# Patient Record
Sex: FEMALE | Race: WHITE | Employment: FULL TIME | URBAN - NONMETROPOLITAN AREA
[De-identification: names, ages, dates, MRNs, and addresses within clinical notes are randomized per-mention and may not be internally consistent; named-entity substitution may affect disease eponyms.]

---

## 2014-05-07 LAB
CHOLESTEROL, TOTAL: 121 MG/DL
CHOLESTEROL/HDL RATIO: 2.3
GLUCOSE BLD-MCNC: 90 MG/DL
HDLC SERPL-MCNC: 53 MG/DL (ref 35–70)
LDL CHOLESTEROL CALCULATED: 52.4 MG/DL (ref 0–160)
TRIGL SERPL-MCNC: 78 MG/DL
VLDLC SERPL CALC-MCNC: 16 MG/DL

## 2017-05-24 ENCOUNTER — OFFICE VISIT (OUTPATIENT)
Dept: FAMILY MEDICINE CLINIC | Age: 42
End: 2017-05-24
Payer: COMMERCIAL

## 2017-05-24 VITALS
SYSTOLIC BLOOD PRESSURE: 122 MMHG | WEIGHT: 140 LBS | HEIGHT: 62 IN | BODY MASS INDEX: 25.76 KG/M2 | TEMPERATURE: 96.9 F | DIASTOLIC BLOOD PRESSURE: 78 MMHG

## 2017-05-24 VITALS
WEIGHT: 136 LBS | BODY MASS INDEX: 25.03 KG/M2 | DIASTOLIC BLOOD PRESSURE: 88 MMHG | HEART RATE: 84 BPM | HEIGHT: 62 IN | SYSTOLIC BLOOD PRESSURE: 130 MMHG

## 2017-05-24 DIAGNOSIS — J01.10 ACUTE NON-RECURRENT FRONTAL SINUSITIS: Primary | ICD-10-CM

## 2017-05-24 DIAGNOSIS — G43.109 MIGRAINE WITH AURA AND WITHOUT STATUS MIGRAINOSUS, NOT INTRACTABLE: ICD-10-CM

## 2017-05-24 DIAGNOSIS — A63.0 GENITAL WARTS: ICD-10-CM

## 2017-05-24 DIAGNOSIS — J40 BRONCHITIS: ICD-10-CM

## 2017-05-24 DIAGNOSIS — Z72.0 TOBACCO USE: ICD-10-CM

## 2017-05-24 PROCEDURE — 99213 OFFICE O/P EST LOW 20 MIN: CPT | Performed by: FAMILY MEDICINE

## 2017-05-24 RX ORDER — PREDNISONE 20 MG/1
20 TABLET ORAL DAILY
Qty: 7 TABLET | Refills: 0 | Status: SHIPPED | OUTPATIENT
Start: 2017-05-24 | End: 2017-05-31

## 2017-05-24 RX ORDER — DOXYCYCLINE HYCLATE 100 MG
100 TABLET ORAL 2 TIMES DAILY
Qty: 20 TABLET | Refills: 0 | Status: SHIPPED | OUTPATIENT
Start: 2017-05-24 | End: 2017-06-03

## 2017-05-24 RX ORDER — ALBUTEROL SULFATE 90 UG/1
2 AEROSOL, METERED RESPIRATORY (INHALATION) EVERY 4 HOURS PRN
Qty: 1 INHALER | Refills: 5 | Status: SHIPPED | OUTPATIENT
Start: 2017-05-24

## 2017-05-24 ASSESSMENT — PATIENT HEALTH QUESTIONNAIRE - PHQ9
SUM OF ALL RESPONSES TO PHQ QUESTIONS 1-9: 0
1. LITTLE INTEREST OR PLEASURE IN DOING THINGS: 0
2. FEELING DOWN, DEPRESSED OR HOPELESS: 0
SUM OF ALL RESPONSES TO PHQ9 QUESTIONS 1 & 2: 0

## 2017-05-24 ASSESSMENT — ENCOUNTER SYMPTOMS
SHORTNESS OF BREATH: 1
COUGH: 1

## 2017-09-07 ENCOUNTER — OFFICE VISIT (OUTPATIENT)
Dept: FAMILY MEDICINE CLINIC | Age: 42
End: 2017-09-07
Payer: COMMERCIAL

## 2017-09-07 VITALS
HEIGHT: 62 IN | SYSTOLIC BLOOD PRESSURE: 114 MMHG | RESPIRATION RATE: 20 BRPM | HEART RATE: 74 BPM | OXYGEN SATURATION: 99 % | BODY MASS INDEX: 25.16 KG/M2 | TEMPERATURE: 97.1 F | DIASTOLIC BLOOD PRESSURE: 82 MMHG | WEIGHT: 136.7 LBS

## 2017-09-07 DIAGNOSIS — J01.00 ACUTE MAXILLARY SINUSITIS, RECURRENCE NOT SPECIFIED: Primary | ICD-10-CM

## 2017-09-07 PROCEDURE — 99213 OFFICE O/P EST LOW 20 MIN: CPT | Performed by: NURSE PRACTITIONER

## 2017-09-07 RX ORDER — CEPHALEXIN 500 MG/1
500 CAPSULE ORAL 3 TIMES DAILY
Qty: 30 CAPSULE | Refills: 0 | Status: SHIPPED | OUTPATIENT
Start: 2017-09-07 | End: 2017-09-17

## 2017-09-07 ASSESSMENT — ENCOUNTER SYMPTOMS
RHINORRHEA: 0
SORE THROAT: 0
SHORTNESS OF BREATH: 1
COUGH: 1
NAUSEA: 0
VOMITING: 0
SWOLLEN GLANDS: 0
DIARRHEA: 0
ABDOMINAL PAIN: 0
WHEEZING: 1
SINUS PAIN: 1

## 2017-09-10 ASSESSMENT — ENCOUNTER SYMPTOMS: SINUS PRESSURE: 1

## 2017-11-18 ENCOUNTER — OFFICE VISIT (OUTPATIENT)
Dept: FAMILY MEDICINE CLINIC | Age: 42
End: 2017-11-18
Payer: COMMERCIAL

## 2017-11-18 VITALS
TEMPERATURE: 98.6 F | WEIGHT: 138 LBS | HEART RATE: 80 BPM | SYSTOLIC BLOOD PRESSURE: 118 MMHG | BODY MASS INDEX: 25.24 KG/M2 | DIASTOLIC BLOOD PRESSURE: 68 MMHG

## 2017-11-18 DIAGNOSIS — Z72.0 TOBACCO USE: ICD-10-CM

## 2017-11-18 DIAGNOSIS — R09.81 NASAL CONGESTION: ICD-10-CM

## 2017-11-18 DIAGNOSIS — J06.9 VIRAL URI: Primary | ICD-10-CM

## 2017-11-18 PROCEDURE — 99213 OFFICE O/P EST LOW 20 MIN: CPT | Performed by: FAMILY MEDICINE

## 2017-11-18 RX ORDER — IPRATROPIUM BROMIDE 42 UG/1
2 SPRAY, METERED NASAL 4 TIMES DAILY PRN
Qty: 1 BOTTLE | Refills: 0 | Status: SHIPPED | OUTPATIENT
Start: 2017-11-18 | End: 2018-04-20 | Stop reason: ALTCHOICE

## 2017-11-18 ASSESSMENT — ENCOUNTER SYMPTOMS
SINUS PAIN: 1
COUGH: 1
SINUS PRESSURE: 1

## 2017-11-18 NOTE — PROGRESS NOTES
[address] [address] [address]  Dept: [phone]  Dept Fax: [phone]      Hong Goodrich is a 40 y.o. female who presents today for her medical conditions/complaints as noted below. Chief Complaint   Patient presents with    Congestion    Facial Pain       HPI:     HPI   Noted for couple days ago noted fluttering in chest like URI sx. Yesterday began with more head pressure and increasing. Today noting body aches. Minimal cough beginning without production, using albuterol daily in am even prior to illness. Pt is smoking still. Had flu vaccine this year. Current Outpatient Prescriptions   Medication Sig Dispense Refill    ipratropium (ATROVENT) 0.06 % nasal spray 2 sprays by Nasal route 4 times daily as needed for Rhinitis 1 Bottle 0    albuterol sulfate HFA (VENTOLIN HFA) 108 (90 BASE) MCG/ACT inhaler Inhale 2 puffs into the lungs every 4 hours as needed for Wheezing or Shortness of Breath 1 Inhaler 5     No current facility-administered medications for this visit. Allergies   Allergen Reactions    Chantix [Varenicline]      vomiting    Prozac [Fluoxetine Hcl]      cutting       Subjective:      Review of Systems   Constitutional: Negative for fever. Sx started approx 2 days ago   HENT: Positive for congestion, sinus pain and sinus pressure. Respiratory: Positive for cough (non productive). Neurological: Positive for headaches (Slight pressure). Objective:     /68   Pulse 80   Temp 98.6 °F (37 °C)   Wt 138 lb (62.6 kg)   BMI 25.24 kg/m²   Physical Exam   Constitutional: She is oriented to person, place, and time. She appears well-developed and well-nourished. No distress. Neck: Neck supple. No thyromegaly present. Cardiovascular: Normal rate. No murmur heard. Pulmonary/Chest: Effort normal and breath sounds normal. No respiratory distress. She has no wheezes.    Neurological: She is alert and oriented to person, place, and time. Assessment:   The primary encounter diagnosis was Viral URI. Diagnoses of Nasal congestion and Tobacco use were also pertinent to this visit. Plan:     Patient Instructions   May use mask around patients, wash hands often. Stop smoking encouraged. Avoid decongestant, increase fluids. Increase fluids except coffee, tea and alcohol. May use tylenol every 4-6 hours or ibuprofen every 8 hours as needed for comfort. No Follow-up on file. No orders of the defined types were placed in this encounter.     Orders Placed This Encounter   Medications    ipratropium (ATROVENT) 0.06 % nasal spray     Si sprays by Nasal route 4 times daily as needed for Rhinitis     Dispense:  1 Bottle     Refill:  0          Electronically signed by Sheri Raymundo MD on 2017 at 9:36 AM

## 2018-01-12 ENCOUNTER — APPOINTMENT (OUTPATIENT)
Dept: INTERVENTIONAL RADIOLOGY/VASCULAR | Age: 43
DRG: 021 | End: 2018-01-12
Payer: COMMERCIAL

## 2018-01-12 ENCOUNTER — HOSPITAL ENCOUNTER (INPATIENT)
Dept: INTERVENTIONAL RADIOLOGY/VASCULAR | Age: 43
Discharge: HOME | DRG: 021 | End: 2018-01-12
Payer: COMMERCIAL

## 2018-01-12 ENCOUNTER — ANESTHESIA EVENT (OUTPATIENT)
Dept: INTERVENTIONAL RADIOLOGY/VASCULAR | Age: 43
DRG: 021 | End: 2018-01-12
Payer: COMMERCIAL

## 2018-01-12 ENCOUNTER — ANESTHESIA (OUTPATIENT)
Dept: INTERVENTIONAL RADIOLOGY/VASCULAR | Age: 43
DRG: 021 | End: 2018-01-12
Payer: COMMERCIAL

## 2018-01-12 ENCOUNTER — HOSPITAL ENCOUNTER (INPATIENT)
Age: 43
LOS: 14 days | Discharge: HOME | DRG: 021 | End: 2018-01-26
Attending: EMERGENCY MEDICINE | Admitting: PSYCHIATRY & NEUROLOGY
Payer: COMMERCIAL

## 2018-01-12 VITALS — SYSTOLIC BLOOD PRESSURE: 110 MMHG | OXYGEN SATURATION: 99 % | DIASTOLIC BLOOD PRESSURE: 70 MMHG

## 2018-01-12 DIAGNOSIS — I67.1 INTRACRANIAL ANEURYSM: ICD-10-CM

## 2018-01-12 DIAGNOSIS — I60.8 ANEURYSMAL SUBARACHNOID HEMORRHAGE (HCC): Primary | ICD-10-CM

## 2018-01-12 PROBLEM — I60.9 SAH (SUBARACHNOID HEMORRHAGE) (HCC): Status: ACTIVE | Noted: 2018-01-12

## 2018-01-12 LAB
ABO/RH: NORMAL
ACTIVATED CLOTTING TIME: 119 SEC (ref 79–149)
ACTIVATED CLOTTING TIME: 172 SEC (ref 79–149)
ANTIBODY SCREEN: NEGATIVE
ARM BAND NUMBER: NORMAL
EXPIRATION DATE: NORMAL

## 2018-01-12 PROCEDURE — 86901 BLOOD TYPING SEROLOGIC RH(D): CPT

## 2018-01-12 PROCEDURE — 75894 X-RAYS TRANSCATH THERAPY: CPT | Performed by: PSYCHIATRY & NEUROLOGY

## 2018-01-12 PROCEDURE — 2580000003 HC RX 258: Performed by: SPECIALIST

## 2018-01-12 PROCEDURE — 6370000000 HC RX 637 (ALT 250 FOR IP): Performed by: PSYCHIATRY & NEUROLOGY

## 2018-01-12 PROCEDURE — 2500000003 HC RX 250 WO HCPCS: Performed by: SPECIALIST

## 2018-01-12 PROCEDURE — 2580000003 HC RX 258: Performed by: EMERGENCY MEDICINE

## 2018-01-12 PROCEDURE — 2580000003 HC RX 258: Performed by: PSYCHIATRY & NEUROLOGY

## 2018-01-12 PROCEDURE — 37242 VASC EMBOLIZE/OCCLUDE ARTERY: CPT | Performed by: PSYCHIATRY & NEUROLOGY

## 2018-01-12 PROCEDURE — 99291 CRITICAL CARE FIRST HOUR: CPT | Performed by: PSYCHIATRY & NEUROLOGY

## 2018-01-12 PROCEDURE — 03VG3DZ RESTRICTION OF INTRACRANIAL ARTERY WITH INTRALUMINAL DEVICE, PERCUTANEOUS APPROACH: ICD-10-PCS | Performed by: PSYCHIATRY & NEUROLOGY

## 2018-01-12 PROCEDURE — 75898 FOLLOW-UP ANGIOGRAPHY: CPT | Performed by: PSYCHIATRY & NEUROLOGY

## 2018-01-12 PROCEDURE — 6360000002 HC RX W HCPCS: Performed by: SPECIALIST

## 2018-01-12 PROCEDURE — 99253 IP/OBS CNSLTJ NEW/EST LOW 45: CPT | Performed by: PHYSICAL MEDICINE & REHABILITATION

## 2018-01-12 PROCEDURE — C1769 GUIDE WIRE: HCPCS

## 2018-01-12 PROCEDURE — 3E053GC INTRODUCTION OF OTHER THERAPEUTIC SUBSTANCE INTO PERIPHERAL ARTERY, PERCUTANEOUS APPROACH: ICD-10-PCS | Performed by: PSYCHIATRY & NEUROLOGY

## 2018-01-12 PROCEDURE — 86900 BLOOD TYPING SEROLOGIC ABO: CPT

## 2018-01-12 PROCEDURE — 99285 EMERGENCY DEPT VISIT HI MDM: CPT

## 2018-01-12 PROCEDURE — B3181ZZ FLUOROSCOPY OF BILATERAL INTERNAL CAROTID ARTERIES USING LOW OSMOLAR CONTRAST: ICD-10-PCS | Performed by: PSYCHIATRY & NEUROLOGY

## 2018-01-12 PROCEDURE — 2500000003 HC RX 250 WO HCPCS: Performed by: EMERGENCY MEDICINE

## 2018-01-12 PROCEDURE — 36224 PLACE CATH CAROTD ART: CPT | Performed by: PSYCHIATRY & NEUROLOGY

## 2018-01-12 PROCEDURE — B3151ZZ FLUOROSCOPY OF BILATERAL COMMON CAROTID ARTERIES USING LOW OSMOLAR CONTRAST: ICD-10-PCS | Performed by: PSYCHIATRY & NEUROLOGY

## 2018-01-12 PROCEDURE — 86850 RBC ANTIBODY SCREEN: CPT

## 2018-01-12 PROCEDURE — 99255 IP/OBS CONSLTJ NEW/EST HI 80: CPT | Performed by: PSYCHIATRY & NEUROLOGY

## 2018-01-12 PROCEDURE — 6360000002 HC RX W HCPCS: Performed by: EMERGENCY MEDICINE

## 2018-01-12 PROCEDURE — 6370000000 HC RX 637 (ALT 250 FOR IP): Performed by: EMERGENCY MEDICINE

## 2018-01-12 PROCEDURE — 6360000004 HC RX CONTRAST MEDICATION: Performed by: EMERGENCY MEDICINE

## 2018-01-12 PROCEDURE — 61624 TCAT PERM OCCLS/EMBOLJ CNS: CPT | Performed by: PSYCHIATRY & NEUROLOGY

## 2018-01-12 PROCEDURE — 36226 PLACE CATH VERTEBRAL ART: CPT | Performed by: PSYCHIATRY & NEUROLOGY

## 2018-01-12 PROCEDURE — 85347 COAGULATION TIME ACTIVATED: CPT

## 2018-01-12 PROCEDURE — 2000000003 HC NEURO ICU R&B

## 2018-01-12 PROCEDURE — 3700000001 HC ADD 15 MINUTES (ANESTHESIA)

## 2018-01-12 PROCEDURE — 3700000000 HC ANESTHESIA ATTENDED CARE

## 2018-01-12 PROCEDURE — B31F1ZZ FLUOROSCOPY OF LEFT VERTEBRAL ARTERY USING LOW OSMOLAR CONTRAST: ICD-10-PCS | Performed by: PSYCHIATRY & NEUROLOGY

## 2018-01-12 PROCEDURE — 6360000002 HC RX W HCPCS: Performed by: NURSE ANESTHETIST, CERTIFIED REGISTERED

## 2018-01-12 PROCEDURE — 2580000003 HC RX 258: Performed by: NURSE ANESTHETIST, CERTIFIED REGISTERED

## 2018-01-12 PROCEDURE — S0028 INJECTION, FAMOTIDINE, 20 MG: HCPCS | Performed by: EMERGENCY MEDICINE

## 2018-01-12 PROCEDURE — 36228 PLACE CATH INTRACRANIAL ART: CPT | Performed by: PSYCHIATRY & NEUROLOGY

## 2018-01-12 RX ORDER — FENTANYL CITRATE 50 UG/ML
50 INJECTION, SOLUTION INTRAMUSCULAR; INTRAVENOUS
Status: DISCONTINUED | OUTPATIENT
Start: 2018-01-12 | End: 2018-01-12

## 2018-01-12 RX ORDER — OXYCODONE HYDROCHLORIDE 5 MG/1
5 TABLET ORAL EVERY 4 HOURS PRN
Status: DISCONTINUED | OUTPATIENT
Start: 2018-01-12 | End: 2018-01-26 | Stop reason: HOSPADM

## 2018-01-12 RX ORDER — ONDANSETRON 2 MG/ML
INJECTION INTRAMUSCULAR; INTRAVENOUS PRN
Status: DISCONTINUED | OUTPATIENT
Start: 2018-01-12 | End: 2018-01-12 | Stop reason: SDUPTHER

## 2018-01-12 RX ORDER — IODIXANOL 270 MG/ML
124 INJECTION, SOLUTION INTRAVASCULAR
Status: COMPLETED | OUTPATIENT
Start: 2018-01-12 | End: 2018-01-12

## 2018-01-12 RX ORDER — SODIUM CHLORIDE 0.9 % (FLUSH) 0.9 %
10 SYRINGE (ML) INJECTION PRN
Status: DISCONTINUED | OUTPATIENT
Start: 2018-01-12 | End: 2018-01-19

## 2018-01-12 RX ORDER — SODIUM CHLORIDE 9 MG/ML
INJECTION, SOLUTION INTRAVENOUS CONTINUOUS PRN
Status: DISCONTINUED | OUTPATIENT
Start: 2018-01-12 | End: 2018-01-12 | Stop reason: SDUPTHER

## 2018-01-12 RX ORDER — FENTANYL CITRATE 50 UG/ML
25 INJECTION, SOLUTION INTRAMUSCULAR; INTRAVENOUS
Status: DISCONTINUED | OUTPATIENT
Start: 2018-01-12 | End: 2018-01-18

## 2018-01-12 RX ORDER — LEVETIRACETAM 5 MG/ML
500 INJECTION INTRAVASCULAR EVERY 12 HOURS
Status: DISCONTINUED | OUTPATIENT
Start: 2018-01-13 | End: 2018-01-13

## 2018-01-12 RX ORDER — ONDANSETRON 2 MG/ML
4 INJECTION INTRAMUSCULAR; INTRAVENOUS EVERY 6 HOURS PRN
Status: DISCONTINUED | OUTPATIENT
Start: 2018-01-12 | End: 2018-01-13

## 2018-01-12 RX ORDER — LABETALOL HYDROCHLORIDE 5 MG/ML
INJECTION, SOLUTION INTRAVENOUS PRN
Status: DISCONTINUED | OUTPATIENT
Start: 2018-01-12 | End: 2018-01-12 | Stop reason: SDUPTHER

## 2018-01-12 RX ORDER — 0.9 % SODIUM CHLORIDE 0.9 %
500 INTRAVENOUS SOLUTION INTRAVENOUS ONCE
Status: COMPLETED | OUTPATIENT
Start: 2018-01-12 | End: 2018-01-13

## 2018-01-12 RX ORDER — SIMVASTATIN 40 MG
80 TABLET ORAL NIGHTLY
Status: DISCONTINUED | OUTPATIENT
Start: 2018-01-12 | End: 2018-01-14

## 2018-01-12 RX ORDER — FENTANYL CITRATE 50 UG/ML
INJECTION, SOLUTION INTRAMUSCULAR; INTRAVENOUS PRN
Status: DISCONTINUED | OUTPATIENT
Start: 2018-01-12 | End: 2018-01-12 | Stop reason: SDUPTHER

## 2018-01-12 RX ORDER — NIMODIPINE 30 MG/1
60 CAPSULE, LIQUID FILLED ORAL
Status: DISCONTINUED | OUTPATIENT
Start: 2018-01-12 | End: 2018-01-13

## 2018-01-12 RX ORDER — SODIUM CHLORIDE 0.9 % (FLUSH) 0.9 %
10 SYRINGE (ML) INJECTION EVERY 12 HOURS SCHEDULED
Status: DISCONTINUED | OUTPATIENT
Start: 2018-01-12 | End: 2018-01-19

## 2018-01-12 RX ORDER — LEVETIRACETAM 10 MG/ML
1000 INJECTION INTRAVASCULAR ONCE
Status: COMPLETED | OUTPATIENT
Start: 2018-01-12 | End: 2018-01-12

## 2018-01-12 RX ORDER — HEPARIN SODIUM 1000 [USP'U]/ML
INJECTION, SOLUTION INTRAVENOUS; SUBCUTANEOUS PRN
Status: DISCONTINUED | OUTPATIENT
Start: 2018-01-12 | End: 2018-01-12 | Stop reason: SDUPTHER

## 2018-01-12 RX ORDER — SODIUM CHLORIDE 9 MG/ML
INJECTION, SOLUTION INTRAVENOUS CONTINUOUS
Status: DISCONTINUED | OUTPATIENT
Start: 2018-01-12 | End: 2018-01-16

## 2018-01-12 RX ORDER — ACETAMINOPHEN 325 MG/1
650 TABLET ORAL EVERY 4 HOURS PRN
Status: DISCONTINUED | OUTPATIENT
Start: 2018-01-12 | End: 2018-01-19

## 2018-01-12 RX ORDER — OXYCODONE HYDROCHLORIDE 5 MG/1
10 TABLET ORAL EVERY 4 HOURS PRN
Status: DISCONTINUED | OUTPATIENT
Start: 2018-01-12 | End: 2018-01-26 | Stop reason: HOSPADM

## 2018-01-12 RX ORDER — ACETAMINOPHEN 325 MG/1
650 TABLET ORAL EVERY 4 HOURS PRN
Status: DISCONTINUED | OUTPATIENT
Start: 2018-01-12 | End: 2018-01-26 | Stop reason: HOSPADM

## 2018-01-12 RX ADMIN — NIMODIPINE 60 MG: 30 CAPSULE, LIQUID FILLED ORAL at 20:03

## 2018-01-12 RX ADMIN — HEPARIN SODIUM 2000 UNITS: 1000 INJECTION, SOLUTION INTRAVENOUS; SUBCUTANEOUS at 16:28

## 2018-01-12 RX ADMIN — NIMODIPINE 60 MG: 30 CAPSULE, LIQUID FILLED ORAL at 23:56

## 2018-01-12 RX ADMIN — SODIUM CHLORIDE: 9 INJECTION, SOLUTION INTRAVENOUS at 14:58

## 2018-01-12 RX ADMIN — FENTANYL CITRATE 50 MCG: 50 INJECTION INTRAMUSCULAR; INTRAVENOUS at 15:23

## 2018-01-12 RX ADMIN — FENTANYL CITRATE 50 MCG: 50 INJECTION, SOLUTION INTRAMUSCULAR; INTRAVENOUS at 17:40

## 2018-01-12 RX ADMIN — FAMOTIDINE 20 MG: 10 INJECTION, SOLUTION INTRAVENOUS at 20:04

## 2018-01-12 RX ADMIN — LABETALOL HYDROCHLORIDE 5 MG: 5 INJECTION, SOLUTION INTRAVENOUS at 16:50

## 2018-01-12 RX ADMIN — IODIXANOL 124 ML: 270 INJECTION, SOLUTION INTRAVASCULAR at 16:40

## 2018-01-12 RX ADMIN — SODIUM CHLORIDE: 9 INJECTION, SOLUTION INTRAVENOUS at 15:34

## 2018-01-12 RX ADMIN — SODIUM CHLORIDE: 9 INJECTION, SOLUTION INTRAVENOUS at 15:23

## 2018-01-12 RX ADMIN — LEVETIRACETAM 1000 MG: 1000 INJECTION, SOLUTION INTRAVENOUS at 20:03

## 2018-01-12 RX ADMIN — FENTANYL CITRATE 50 MCG: 50 INJECTION INTRAMUSCULAR; INTRAVENOUS at 16:43

## 2018-01-12 RX ADMIN — SODIUM CHLORIDE: 9 INJECTION, SOLUTION INTRAVENOUS at 20:04

## 2018-01-12 RX ADMIN — FENTANYL CITRATE 50 MCG: 50 INJECTION INTRAMUSCULAR; INTRAVENOUS at 15:38

## 2018-01-12 RX ADMIN — FENTANYL CITRATE 25 MCG: 50 INJECTION, SOLUTION INTRAMUSCULAR; INTRAVENOUS at 23:35

## 2018-01-12 RX ADMIN — SODIUM CHLORIDE 500 ML: 9 INJECTION, SOLUTION INTRAVENOUS at 23:13

## 2018-01-12 RX ADMIN — FENTANYL CITRATE 50 MCG: 50 INJECTION, SOLUTION INTRAMUSCULAR; INTRAVENOUS at 22:27

## 2018-01-12 RX ADMIN — ONDANSETRON 4 MG: 2 INJECTION INTRAMUSCULAR; INTRAVENOUS at 22:24

## 2018-01-12 RX ADMIN — SIMVASTATIN 80 MG: 40 TABLET, FILM COATED ORAL at 22:16

## 2018-01-12 RX ADMIN — FENTANYL CITRATE 50 MCG: 50 INJECTION INTRAMUSCULAR; INTRAVENOUS at 15:28

## 2018-01-12 RX ADMIN — HEPARIN SODIUM 2000 UNITS: 1000 INJECTION, SOLUTION INTRAVENOUS; SUBCUTANEOUS at 16:23

## 2018-01-12 RX ADMIN — FENTANYL CITRATE 50 MCG: 50 INJECTION, SOLUTION INTRAMUSCULAR; INTRAVENOUS at 20:54

## 2018-01-12 RX ADMIN — ONDANSETRON 4 MG: 2 INJECTION, SOLUTION INTRAMUSCULAR; INTRAVENOUS at 17:03

## 2018-01-12 RX ADMIN — Medication 10 ML: at 20:03

## 2018-01-12 ASSESSMENT — ENCOUNTER SYMPTOMS
BLOOD IN STOOL: 0
STRIDOR: 0
PHOTOPHOBIA: 0
SHORTNESS OF BREATH: 0
SINUS PRESSURE: 0
EYE PAIN: 0
RHINORRHEA: 0
VOMITING: 0
SORE THROAT: 0
ABDOMINAL PAIN: 0
DIARRHEA: 0
COUGH: 0
SHORTNESS OF BREATH: 0
NAUSEA: 0

## 2018-01-12 ASSESSMENT — PULMONARY FUNCTION TESTS
PIF_VALUE: 0
PIF_VALUE: 1
PIF_VALUE: 0
PIF_VALUE: 1
PIF_VALUE: 0
PIF_VALUE: 0
PIF_VALUE: 1
PIF_VALUE: 0
PIF_VALUE: 1
PIF_VALUE: 0
PIF_VALUE: 1
PIF_VALUE: 0
PIF_VALUE: 0
PIF_VALUE: 1
PIF_VALUE: 0
PIF_VALUE: 1
PIF_VALUE: 0
PIF_VALUE: 1
PIF_VALUE: 1
PIF_VALUE: 0
PIF_VALUE: 1
PIF_VALUE: 0
PIF_VALUE: 1
PIF_VALUE: 0
PIF_VALUE: 1
PIF_VALUE: 0
PIF_VALUE: 1
PIF_VALUE: 0
PIF_VALUE: 1
PIF_VALUE: 0
PIF_VALUE: 1
PIF_VALUE: 0
PIF_VALUE: 1
PIF_VALUE: 0
PIF_VALUE: 1
PIF_VALUE: 0
PIF_VALUE: 1
PIF_VALUE: 0
PIF_VALUE: 1
PIF_VALUE: 1

## 2018-01-12 ASSESSMENT — LIFESTYLE VARIABLES: SMOKING_STATUS: 1

## 2018-01-12 ASSESSMENT — PAIN SCALES - GENERAL
PAINLEVEL_OUTOF10: 7
PAINLEVEL_OUTOF10: 10
PAINLEVEL_OUTOF10: 9
PAINLEVEL_OUTOF10: 10

## 2018-01-12 ASSESSMENT — PAIN DESCRIPTION - LOCATION
LOCATION: HEAD
LOCATION: HEAD

## 2018-01-12 ASSESSMENT — PAIN DESCRIPTION - PAIN TYPE
TYPE: ACUTE PAIN
TYPE: ACUTE PAIN

## 2018-01-12 ASSESSMENT — PAIN DESCRIPTION - DESCRIPTORS: DESCRIPTORS: HEADACHE

## 2018-01-12 NOTE — PROGRESS NOTES
Pt received from angio lab. Report received from [employer]. Pt connected to monitor and VS/assessment obtained. Pt oriented to room and call light. Will cont to monitor.

## 2018-01-12 NOTE — RESEARCH
Wale Dickey was asked by Dr. Kike Darling to evaluate Ms. Jr Treadwell for the TARGET Intracranial Aneurysm Coiling Registry: A Prospective Clinical Efficacy and Safety Study of [place] Target 285F, Target Helical, and 2nd generation Target Saba Coils. The patient met pre-procedure inclusion and exclusion criteria for the study. I met with the patient's family, daughters Denisse Nixon and Kacy Garcia, and boyfriend David Zepeda, to explain the study to them while Ms. Jr Treadwell was in her procedure. I provided them with a copy of the consent for review at 16:15. Charis and David Zepeda started to read the consent and all of their questions were answered. They were informed that Dr. Kike Darling would be out to talk to them after the procedure and he would be available to answer any questions they may come up with while reading the rest of the consent. I was informed by Dr. Kike Darling that the patient met all procedural inclusion and exclusion criteria. The patient's daughter, Denisse Nixon, signed the consent form post-procedure in his presence after a thorough discussion. A copy of the signed consent will be provided to her. The patient's subject number is [id number]. We will continue to follow the patient throughout the course of the study.  If you have any questions, please contact Marianna Biggs at [phone] or page [phone] for an immediate response.    ______________________  YOJANA Barclay, RN  Clinical Research Services  [address] [place] [address], [facility] Julio Cesar Kaur  P: [phone]  F: [phone]

## 2018-01-12 NOTE — CONSULTS
thyroid    Breast Cancer Paternal Grandmother          Diagnostics:    CBC: No results for input(s): WBC, RBC, HGB, HCT, MCV, RDW, PLT in the last 72 hours. BMP: No results for input(s): NA, K, CL, CO2, PHOS, BUN, CREATININE in the last 72 hours. Invalid input(s): CA  BNP: No results for input(s): BNP in the last 72 hours. PT/INR: No results for input(s): PROTIME, INR in the last 72 hours. APTT: No results for input(s): APTT in the last 72 hours. CARDIAC ENZYMES: No results for input(s): CKMB, CKMBINDEX, TROPONINT in the last 72 hours. Invalid input(s): CKTOTAL;3  FASTING LIPID PANEL:  Lab Results   Component Value Date    CHOL 121 05/07/2014    HDL 53 05/07/2014    TRIG 78 05/07/2014     LIVER PROFILE: No results for input(s): AST, ALT, ALB, BILIDIR, BILITOT, ALKPHOS in the last 72 hours. Physical Exam:  BP (P) 103/69   Pulse 74   Temp (P) 98.1 °F (36.7 °C) (Oral)   Resp 18   Ht 5' 2\" (1.575 m)   Wt 136 lb (61.7 kg)   SpO2 100%   BMI 24.87 kg/m²   Alert, no distress. Oriented x 3  Good speech and language function. Lungs clear. Heart regular. Abdomen non-distended, non-tender. No calf tenderness or edema. Sensory: Intact in BUE and BLE to soft sensation. Motor: Muscle tone and bulk are normal bilaterally. MS- NT (due to just out of surgery)    Impression:  Patient is a 43 yo female with a [facility] secondary to aneurysm    Recommendations:  1. The patient is on bedrest after her embolization surgery. Please have the patient initiated in PT, OT, and ST once okay with the primary service. The rehab team will continue to follow the patient. 2.  Neuro- SAH. SBP < 120. on keppra, nimodipine, and zocor. 3.  Home disposition- patient lives with boyfriend who works. It was my pleasure to evaluate Felicita Durant today. Please call with questions.     Shreyas Garcia MD        This note was completed by using EMR and the assistance of a speech-recognition program. However, inadvertent

## 2018-01-12 NOTE — ED NOTES
Pt arrived to ed. Transfer from [facility]. Pt woke up around 10 am this morning, sitting down with headache and vomiting. Neuro is intact, no deficits. Pt has aneurysm. Pt was given fentanyl, ativan, and Zofran PTA.       Liane Gomez RN  01/12/18 [id number]

## 2018-01-12 NOTE — ED PROVIDER NOTES
[facility]  Emergency Department Encounter  Emergency Medicine Resident     Pt Name: Edel Joshi  MRN: [id number]  [place] 1975  Date of evaluation: 1/12/18  PCP:  Louis Kaur MD    [address] [place] [address]       Chief Complaint   Patient presents with    Headache     subarachnoid bleed, woke up with headache and vomiting this morning around 10 am while sitting down        HISTORY OF PRESENT ILLNESS     Edel Joshi is a 44 y.o. female who presents As a transfer from [facility] for headache that occurred around 10 AM this morning is acute severe. Symptoms were brought without any changes in visual acuity, numbness Barrett or weakness. Patient did have neck pain or stiffness. CT scan of the brain at [facility] showed a diffuse subarachnoid hemorrhage. Patient was given 1 g IV, Keppra, antiemetics and analgesics prior to transfer. Patient to be taken to the endovascular angiography suite by the vascular upon arrive all. Patient had recently used an Aleve 2 days ago for chronic arthralgias. Denies any other anticoagulant IT platelet therapies. Occasionally uses inhalers. 4.  History of smoking     PAST MEDICAL / SURGICAL / SOCIAL / FAMILY HISTORY      has a past medical history of Caffeinism; Low back pain; Migraine; Patient in clinical research study; and Tobacco use.     has a past surgical history that includes other surgical history (04/02/2013) and Tubal ligation (05/24/2011).     Social History     Social History    Marital status: Single     Spouse name: N/A    Number of children: N/A    Years of education: N/A     Occupational History    radiology [facility]     Social History Main Topics    Smoking status: Current Every Day Smoker     Packs/day: 1.00    Smokeless tobacco: Never Used    Alcohol use No    Drug use: No    Sexual activity: Yes     Partners: Male     Other Topics Concern    Not on file     Social History Narrative    No narrative on

## 2018-01-12 NOTE — H&P
Neuro ICU History & Physical    Patient Name: Shahram Quiroz  Patient : 1975  Room/Bed: [facility]-  Allergies: Allergies   Allergen Reactions    Chantix [Varenicline]      vomiting    Prozac [Fluoxetine Hcl]      cutting     Problem List:   Patient Active Problem List   Diagnosis    Tobacco use    Genital warts    Migraine    [facility]achnoid hemorrhage) ([facility])       CHIEF COMPLAINT     SAH    HPI    History Obtained From: Patient and EMR     The patient is a 44 y.o. female presented as a transfer from [address] [place] [address] who initially presented with thunderclap headache at 10:45 AM.  Found to have a SAH on CT head and transferred to our facility. No significant PMH. Family history of brain aneurysm in her father. Daily smoker. 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 Currently complaining of severe headache but no other complaints.      Admitted to ICU From: PACU    Reason for ICU Admission: Post aneurysm coiling w/ SAH        PATIENT HISTORY   Past Medical History:        Diagnosis Date    Caffeinism     Low back pain     Migraine     Tobacco use        Past Surgical History:        Procedure Laterality Date    OTHER SURGICAL HISTORY  2013    ablasion    TUBAL LIGATION  2011       Social History:   Social History     Social History    Marital status: Single     Spouse name: N/A    Number of children: N/A    Years of education: N/A     Occupational History    radiology tech [employer]     Social History Main Topics    Smoking status: Current Every Day Smoker     Packs/day: 1.00    Smokeless tobacco: Never Used    Alcohol use No    Drug use: No    Sexual activity: Yes     Partners: Male     Other Topics Concern    Not on file     Social History Narrative    No narrative on file       Family History:       Problem Relation Age of Onset    Other Mother      suicide, uterine fibroids    Alcohol Abuse Father     Other Father      brain aneurysm    Cancer Sister      thyroid    Breast Cancer Paternal Grandmother        Allergies:    Chantix [varenicline] and Prozac [fluoxetine hcl]    Medications Prior to Admission:    Prescriptions Prior to Admission: ipratropium (ATROVENT) 0.06 % nasal spray, 2 sprays by Nasal route 4 times daily as needed for Rhinitis  albuterol sulfate HFA (VENTOLIN HFA) 108 (90 BASE) MCG/ACT inhaler, Inhale 2 puffs into the lungs every 4 hours as needed for Wheezing or Shortness of Breath    Current Medications:  Current Facility-Administered Medications: acetaminophen (TYLENOL) tablet 650 mg, 650 mg, Oral, Q4H PRN  0.9 % sodium chloride infusion, , Intravenous, Continuous  simvastatin (ZOCOR) tablet 80 mg, 80 mg, Oral, Nightly  sodium chloride flush 0.9 % injection 10 mL, 10 mL, Intravenous, 2 times per day  sodium chloride flush 0.9 % injection 10 mL, 10 mL, Intravenous, PRN  acetaminophen (TYLENOL) tablet 650 mg, 650 mg, Oral, Q4H PRN  ondansetron (ZOFRAN) injection 4 mg, 4 mg, Intravenous, Q6H PRN  levetiracetam (KEPPRA) 1000 mg/100 mL IVPB, 1,000 mg, Intravenous, Once **FOLLOWED BY** [START ON 1/13/2018] levetiracetam (KEPPRA) 500 mg/100 mL IVPB, 500 mg, Intravenous, Q12H  niMODipine (NIMOTOP) capsule 60 mg, 60 mg, Oral, 6 times per day  oxyCODONE (ROXICODONE) immediate release tablet 5 mg, 5 mg, Oral, Q4H PRN **OR** oxyCODONE (ROXICODONE) immediate release tablet 10 mg, 10 mg, Oral, Q4H PRN  famotidine (PEPCID) injection 20 mg, 20 mg, Intravenous, BID  fentaNYL (SUBLIMAZE) injection 25 mcg, 25 mcg, Intravenous, Q1H PRN **OR** fentaNYL (SUBLIMAZE) injection 50 mcg, 50 mcg, Intravenous, Q1H PRN    REVIEW OF SYSTEMS     CONSTITUTIONAL: negative for fatigue and malaise   EYES: negative for double vision, positive for photophobia    HEENT: negative for tinnitus and sore throat   RESPIRATORY: negative for cough, shortness of breath   CARDIOVASCULAR: negative for chest pain, palpitations, or syncope   GASTROINTESTINAL: negative for abdominal pain, nausea, vomiting, diarrhea, or constipation    GENITOURINARY: negative for incontinence or retention    MUSCULOSKELETAL: negative for neck or back pain, negative for extremity pain   NEUROLOGICAL: Negative for seizures, weakness, numbness, confusion, aphasia, dysarthria, positive for severe headache    PSYCHIATRIC: negative for agitation, hallucination, SI/HI   SKIN Negative for spontaneous contusions, rashes, or lesions      PHYSICAL EXAM:     BP (!) 130/95   Pulse 74   Temp 98.3 °F (36.8 °C) (Oral)   Resp 18   Ht 5' 2\" (1.575 m)   Wt 136 lb (61.7 kg)   SpO2 100%   BMI 24.87 kg/m²     Estimated body mass index is 24.87 kg/m² as calculated from the following:    Height as of this encounter: 5' 2\" (1.575 m).     Weight as of this encounter: 136 lb (61.7 kg).  []<16 Severe malnutrition  []1616.99 Moderate malnutrition  []1718.49 Mild malnutrition  [x]18.524.9 Normal  []2529.9 Overweight (not obese)  []3034.9 Obese class 1 (Low Risk)  []3539.9 Obese class 2 (Moderate Risk)  []?40 Obese class 3 (High Risk)    PHYSICAL EXAM:  CONSTITUTIONAL:  Well developed, well nourished, alert and oriented x 3, appears uncomfortable    HEAD:  normocephalic, atraumatic    EYES:  PERRLA, EOMI, positive for photophobia    ENT:  moist mucous membranes   LUNGS:  Equal air entry bilaterally   CARDIOVASCULAR:  normal s1 / s2   ABDOMEN:  Soft, no rigidity   NECK supple, symmetric   EXTREMITIES Normal ROM with no deformities   NEUROLOGIC:  Mental Status:  A & O x3,awake             Cranial Nerves:    cranial nerves II-XII are grossly intact    Motor Exam:    Drift:  absent  Tone:  normal    Motor exam is symmetrical 5 out of 5 all extremities bilaterally    Sensory:    Touch:    Right Upper Extremity:  normal  Left Upper Extremity:  normal  Right Lower Extremity:  normal  Left Lower Extremity:  normal     SKIN No obvious ecchymosis, rashes, or lesions LABS AND IMAGING:     RECENT LABS:  CBC with Differential:  No results found for: WBC, RBC, HGB, HCT, PLT, MCV, MCH, MCHC, RDW, NRBC, SEGSPCT, BANDSPCT, BLASTSPCT, METASPCT, LYMPHOPCT, PROMYELOPCT, MONOPCT, MYELOPCT, EOSPCT, BASOPCT, MONOSABS, LYMPHSABS, EOSABS, BASOSABS, DIFFTYPE  BMP:    Lab Results   Component Value Date    GLUCOSE 90 05/07/2014       RADIOLOGY:   No results found. Labs and Images reviewed with:  [] Carolee Zee MD      [x] Jose Antonio Greenberg MD  [] Tacho Horn MD       [] Nimo Ortiz MD  [] Andie Ramos MD  [] Yoselyn Alan MD  [] Seymour Coleman MD  --[] there are no new interval images to review. ASSESSMENT AND PLAN:       Patient Active Problem List   Diagnosis    Tobacco use    Genital warts    Migraine    [facility]arachnoid hemorrhage) ([facility])       ASSESSMENT:     This is a 44 y.o. female with sudden onset headache at 10:45 AM.  CT head at [facility] facility showing [facility]. Transferred to our facility and was taken to angio on 1/12. Found to have aneurysm at the R MCA bifurcation with successful coiling.      Patient care will be discussed with attending, will reevaluate patient along with attending     PLAN/MEDICAL DECISION MAKING:    NEUROLOGIC:  - Nimotop 60 mg q4h   - Keppra loading dose 1 g and then 500 mg BID  - Zocor 80 mg nightly   - TCD starting on 1/15   - Pain control: tylenol prn, fentanyl 25 mcg or 50 mcg q1h prn, Kristi 5 or 10 mg q4h prn   - Neuro checks per protocol    CARDIOVASCULAR:  - SBP < 120  - Continue telemetry monitoring     PULMONARY:  - Saturating well on room air     RENAL/FLUID/ELECTROLYTE:  - Normal saline @ 125 cc/hr    GI/NUTRITION:  - Diet: Diet NPO Effective Now  - GI Prophylaxis: pepcid 20 mg BID       HEME/ID:  - Afebrile  - Repeat CBC in AM       ENDOCRINE:  - monitor blood glucose  - recommend < 180    OTHER:  - PT/OT eval       DVT PROPHYLAXIS:  - SCD sleeves - Thigh High   - AIDAN stockings - Thigh High  - No chemoprophylaxis

## 2018-01-13 ENCOUNTER — APPOINTMENT (OUTPATIENT)
Dept: CT IMAGING | Age: 43
DRG: 021 | End: 2018-01-13
Payer: COMMERCIAL

## 2018-01-13 LAB
ANION GAP SERPL CALCULATED.3IONS-SCNC: 12 MMOL/L (ref 9–17)
BUN BLDV-MCNC: 7 MG/DL (ref 6–20)
BUN/CREAT BLD: ABNORMAL (ref 9–20)
CALCIUM IONIZED: 1.06 MMOL/L (ref 1.13–1.33)
CALCIUM SERPL-MCNC: 6.3 MG/DL (ref 8.6–10.4)
CHLORIDE BLD-SCNC: 110 MMOL/L (ref 98–107)
CHOLESTEROL/HDL RATIO: 2.7
CHOLESTEROL: 85 MG/DL
CO2: 16 MMOL/L (ref 20–31)
CREAT SERPL-MCNC: 0.44 MG/DL (ref 0.5–0.9)
GFR AFRICAN AMERICAN: >60 ML/MIN
GFR NON-AFRICAN AMERICAN: >60 ML/MIN
GFR SERPL CREATININE-BSD FRML MDRD: ABNORMAL ML/MIN/{1.73_M2}
GFR SERPL CREATININE-BSD FRML MDRD: ABNORMAL ML/MIN/{1.73_M2}
GLUCOSE BLD-MCNC: 100 MG/DL (ref 70–99)
HCT VFR BLD CALC: 31 % (ref 36.3–47.1)
HDLC SERPL-MCNC: 32 MG/DL
HEMOGLOBIN: 10.2 G/DL (ref 11.9–15.1)
LDL CHOLESTEROL: 37 MG/DL (ref 0–130)
MAGNESIUM: 1.7 MG/DL (ref 1.6–2.6)
MCH RBC QN AUTO: 32.2 PG (ref 25.2–33.5)
MCHC RBC AUTO-ENTMCNC: 32.9 G/DL (ref 28.4–34.8)
MCV RBC AUTO: 97.8 FL (ref 82.6–102.9)
MRSA, DNA, NASAL: NORMAL
PDW BLD-RTO: 12.6 % (ref 11.8–14.4)
PLATELET # BLD: 216 K/UL (ref 138–453)
PMV BLD AUTO: 9.4 FL (ref 8.1–13.5)
POTASSIUM SERPL-SCNC: 3.6 MMOL/L (ref 3.7–5.3)
RBC # BLD: 3.17 M/UL (ref 3.95–5.11)
SODIUM BLD-SCNC: 138 MMOL/L (ref 135–144)
SPECIMEN DESCRIPTION: NORMAL
TRIGL SERPL-MCNC: 78 MG/DL
TROPONIN INTERP: NORMAL
TROPONIN T: <0.03 NG/ML
VLDLC SERPL CALC-MCNC: ABNORMAL MG/DL (ref 1–30)
WBC # BLD: 11 K/UL (ref 3.5–11.3)

## 2018-01-13 PROCEDURE — 6370000000 HC RX 637 (ALT 250 FOR IP): Performed by: PSYCHIATRY & NEUROLOGY

## 2018-01-13 PROCEDURE — 2580000003 HC RX 258: Performed by: EMERGENCY MEDICINE

## 2018-01-13 PROCEDURE — 99291 CRITICAL CARE FIRST HOUR: CPT | Performed by: PSYCHIATRY & NEUROLOGY

## 2018-01-13 PROCEDURE — S0028 INJECTION, FAMOTIDINE, 20 MG: HCPCS | Performed by: NURSE PRACTITIONER

## 2018-01-13 PROCEDURE — 70450 CT HEAD/BRAIN W/O DYE: CPT

## 2018-01-13 PROCEDURE — 36415 COLL VENOUS BLD VENIPUNCTURE: CPT

## 2018-01-13 PROCEDURE — 85027 COMPLETE CBC AUTOMATED: CPT

## 2018-01-13 PROCEDURE — 87641 MR-STAPH DNA AMP PROBE: CPT

## 2018-01-13 PROCEDURE — 84484 ASSAY OF TROPONIN QUANT: CPT

## 2018-01-13 PROCEDURE — 92523 SPEECH SOUND LANG COMPREHEN: CPT

## 2018-01-13 PROCEDURE — 82330 ASSAY OF CALCIUM: CPT

## 2018-01-13 PROCEDURE — S0028 INJECTION, FAMOTIDINE, 20 MG: HCPCS | Performed by: EMERGENCY MEDICINE

## 2018-01-13 PROCEDURE — 99233 SBSQ HOSP IP/OBS HIGH 50: CPT | Performed by: PSYCHIATRY & NEUROLOGY

## 2018-01-13 PROCEDURE — 6360000002 HC RX W HCPCS: Performed by: EMERGENCY MEDICINE

## 2018-01-13 PROCEDURE — 6370000000 HC RX 637 (ALT 250 FOR IP): Performed by: EMERGENCY MEDICINE

## 2018-01-13 PROCEDURE — 6370000000 HC RX 637 (ALT 250 FOR IP): Performed by: NURSE PRACTITIONER

## 2018-01-13 PROCEDURE — 2500000003 HC RX 250 WO HCPCS: Performed by: NURSE PRACTITIONER

## 2018-01-13 PROCEDURE — 2000000003 HC NEURO ICU R&B

## 2018-01-13 PROCEDURE — 2500000003 HC RX 250 WO HCPCS: Performed by: EMERGENCY MEDICINE

## 2018-01-13 PROCEDURE — 87086 URINE CULTURE/COLONY COUNT: CPT

## 2018-01-13 PROCEDURE — 2580000003 HC RX 258: Performed by: NURSE PRACTITIONER

## 2018-01-13 PROCEDURE — 83735 ASSAY OF MAGNESIUM: CPT

## 2018-01-13 PROCEDURE — 80048 BASIC METABOLIC PNL TOTAL CA: CPT

## 2018-01-13 PROCEDURE — 80061 LIPID PANEL: CPT

## 2018-01-13 PROCEDURE — 94762 N-INVAS EAR/PLS OXIMTRY CONT: CPT

## 2018-01-13 PROCEDURE — 93005 ELECTROCARDIOGRAM TRACING: CPT

## 2018-01-13 PROCEDURE — 6360000002 HC RX W HCPCS: Performed by: NURSE PRACTITIONER

## 2018-01-13 RX ORDER — 0.9 % SODIUM CHLORIDE 0.9 %
1000 INTRAVENOUS SOLUTION INTRAVENOUS ONCE
Status: COMPLETED | OUTPATIENT
Start: 2018-01-13 | End: 2018-01-13

## 2018-01-13 RX ORDER — DOCUSATE SODIUM 100 MG/1
100 CAPSULE, LIQUID FILLED ORAL DAILY
Status: DISCONTINUED | OUTPATIENT
Start: 2018-01-13 | End: 2018-01-26 | Stop reason: HOSPADM

## 2018-01-13 RX ORDER — DEXAMETHASONE 2 MG/1
2 TABLET ORAL EVERY 6 HOURS SCHEDULED
Status: DISCONTINUED | OUTPATIENT
Start: 2018-01-13 | End: 2018-01-13

## 2018-01-13 RX ORDER — NIMODIPINE 30 MG/1
30 CAPSULE, LIQUID FILLED ORAL
Status: DISCONTINUED | OUTPATIENT
Start: 2018-01-13 | End: 2018-01-14

## 2018-01-13 RX ORDER — ONDANSETRON 2 MG/ML
4 INJECTION INTRAMUSCULAR; INTRAVENOUS ONCE
Status: COMPLETED | OUTPATIENT
Start: 2018-01-13 | End: 2018-01-13

## 2018-01-13 RX ORDER — DIAZEPAM 2 MG/1
2 TABLET ORAL ONCE
Status: COMPLETED | OUTPATIENT
Start: 2018-01-13 | End: 2018-01-13

## 2018-01-13 RX ORDER — NICOTINE 21 MG/24HR
1 PATCH, TRANSDERMAL 24 HOURS TRANSDERMAL DAILY
Status: DISCONTINUED | OUTPATIENT
Start: 2018-01-13 | End: 2018-01-26 | Stop reason: HOSPADM

## 2018-01-13 RX ORDER — DEXAMETHASONE SODIUM PHOSPHATE 4 MG/ML
2 INJECTION, SOLUTION INTRA-ARTICULAR; INTRALESIONAL; INTRAMUSCULAR; INTRAVENOUS; SOFT TISSUE EVERY 6 HOURS
Status: COMPLETED | OUTPATIENT
Start: 2018-01-13 | End: 2018-01-16

## 2018-01-13 RX ORDER — ONDANSETRON 2 MG/ML
4 INJECTION INTRAMUSCULAR; INTRAVENOUS EVERY 4 HOURS PRN
Status: DISCONTINUED | OUTPATIENT
Start: 2018-01-13 | End: 2018-01-26 | Stop reason: HOSPADM

## 2018-01-13 RX ADMIN — DIAZEPAM 2 MG: 2 TABLET ORAL at 23:31

## 2018-01-13 RX ADMIN — FENTANYL CITRATE 25 MCG: 50 INJECTION, SOLUTION INTRAMUSCULAR; INTRAVENOUS at 10:00

## 2018-01-13 RX ADMIN — SODIUM CHLORIDE 1000 ML: 9 INJECTION, SOLUTION INTRAVENOUS at 05:30

## 2018-01-13 RX ADMIN — DOCUSATE SODIUM 100 MG: 100 CAPSULE, LIQUID FILLED ORAL at 12:19

## 2018-01-13 RX ADMIN — FENTANYL CITRATE 25 MCG: 50 INJECTION, SOLUTION INTRAMUSCULAR; INTRAVENOUS at 07:53

## 2018-01-13 RX ADMIN — DEXAMETHASONE SODIUM PHOSPHATE 2 MG: 4 INJECTION, SOLUTION INTRAMUSCULAR; INTRAVENOUS at 17:37

## 2018-01-13 RX ADMIN — FENTANYL CITRATE 25 MCG: 50 INJECTION, SOLUTION INTRAMUSCULAR; INTRAVENOUS at 05:58

## 2018-01-13 RX ADMIN — DEXAMETHASONE SODIUM PHOSPHATE 2 MG: 4 INJECTION, SOLUTION INTRAMUSCULAR; INTRAVENOUS at 22:29

## 2018-01-13 RX ADMIN — Medication 10 ML: at 08:26

## 2018-01-13 RX ADMIN — NIMODIPINE 30 MG: 30 CAPSULE, LIQUID FILLED ORAL at 09:23

## 2018-01-13 RX ADMIN — LEVETIRACETAM 500 MG: 5 INJECTION INTRAVENOUS at 05:04

## 2018-01-13 RX ADMIN — Medication 10 ML: at 20:31

## 2018-01-13 RX ADMIN — NIMODIPINE 30 MG: 30 CAPSULE, LIQUID FILLED ORAL at 12:19

## 2018-01-13 RX ADMIN — SODIUM CHLORIDE 1000 ML: 9 INJECTION, SOLUTION INTRAVENOUS at 02:50

## 2018-01-13 RX ADMIN — ONDANSETRON 4 MG: 2 INJECTION INTRAMUSCULAR; INTRAVENOUS at 16:24

## 2018-01-13 RX ADMIN — ONDANSETRON 4 MG: 2 INJECTION INTRAMUSCULAR; INTRAVENOUS at 20:27

## 2018-01-13 RX ADMIN — NIMODIPINE 60 MG: 30 CAPSULE, LIQUID FILLED ORAL at 04:56

## 2018-01-13 RX ADMIN — FENTANYL CITRATE 25 MCG: 50 INJECTION, SOLUTION INTRAMUSCULAR; INTRAVENOUS at 04:30

## 2018-01-13 RX ADMIN — FENTANYL CITRATE 25 MCG: 50 INJECTION, SOLUTION INTRAMUSCULAR; INTRAVENOUS at 02:07

## 2018-01-13 RX ADMIN — FAMOTIDINE 20 MG: 10 INJECTION, SOLUTION INTRAVENOUS at 20:31

## 2018-01-13 RX ADMIN — FENTANYL CITRATE 25 MCG: 50 INJECTION, SOLUTION INTRAMUSCULAR; INTRAVENOUS at 19:58

## 2018-01-13 RX ADMIN — ONDANSETRON 4 MG: 2 INJECTION INTRAMUSCULAR; INTRAVENOUS at 07:53

## 2018-01-13 RX ADMIN — NIMODIPINE 30 MG: 30 CAPSULE, LIQUID FILLED ORAL at 14:48

## 2018-01-13 RX ADMIN — OXYCODONE HYDROCHLORIDE 10 MG: 5 TABLET ORAL at 20:27

## 2018-01-13 RX ADMIN — OXYCODONE HYDROCHLORIDE 10 MG: 5 TABLET ORAL at 16:23

## 2018-01-13 RX ADMIN — FENTANYL CITRATE 25 MCG: 50 INJECTION, SOLUTION INTRAMUSCULAR; INTRAVENOUS at 00:49

## 2018-01-13 RX ADMIN — SIMVASTATIN 80 MG: 40 TABLET, FILM COATED ORAL at 20:31

## 2018-01-13 RX ADMIN — SODIUM CHLORIDE 1000 ML: 9 INJECTION, SOLUTION INTRAVENOUS at 01:11

## 2018-01-13 RX ADMIN — OXYCODONE HYDROCHLORIDE 10 MG: 5 TABLET ORAL at 12:19

## 2018-01-13 RX ADMIN — NIMODIPINE 30 MG: 30 CAPSULE, LIQUID FILLED ORAL at 17:37

## 2018-01-13 RX ADMIN — CALCIUM GLUCONATE 1 G: 94 INJECTION, SOLUTION INTRAVENOUS at 10:46

## 2018-01-13 RX ADMIN — NIMODIPINE 30 MG: 30 CAPSULE, LIQUID FILLED ORAL at 20:30

## 2018-01-13 RX ADMIN — NIMODIPINE 30 MG: 30 CAPSULE, LIQUID FILLED ORAL at 16:23

## 2018-01-13 RX ADMIN — NIMODIPINE 30 MG: 30 CAPSULE, LIQUID FILLED ORAL at 22:29

## 2018-01-13 RX ADMIN — ONDANSETRON 4 MG: 2 INJECTION INTRAMUSCULAR; INTRAVENOUS at 12:19

## 2018-01-13 RX ADMIN — DEXAMETHASONE SODIUM PHOSPHATE 2 MG: 4 INJECTION, SOLUTION INTRAMUSCULAR; INTRAVENOUS at 11:05

## 2018-01-13 RX ADMIN — ONDANSETRON 4 MG: 2 INJECTION, SOLUTION INTRAMUSCULAR; INTRAVENOUS at 11:05

## 2018-01-13 RX ADMIN — ONDANSETRON 4 MG: 2 INJECTION INTRAMUSCULAR; INTRAVENOUS at 04:30

## 2018-01-13 RX ADMIN — FAMOTIDINE 20 MG: 10 INJECTION, SOLUTION INTRAVENOUS at 07:54

## 2018-01-13 RX ADMIN — OXYCODONE HYDROCHLORIDE 10 MG: 5 TABLET ORAL at 08:26

## 2018-01-13 RX ADMIN — NIMODIPINE 30 MG: 30 CAPSULE, LIQUID FILLED ORAL at 08:00

## 2018-01-13 RX ADMIN — FENTANYL CITRATE 25 MCG: 50 INJECTION, SOLUTION INTRAMUSCULAR; INTRAVENOUS at 17:02

## 2018-01-13 ASSESSMENT — PAIN SCALES - GENERAL
PAINLEVEL_OUTOF10: 10
PAINLEVEL_OUTOF10: 10
PAINLEVEL_OUTOF10: 5
PAINLEVEL_OUTOF10: 8
PAINLEVEL_OUTOF10: 10
PAINLEVEL_OUTOF10: 10
PAINLEVEL_OUTOF10: 9
PAINLEVEL_OUTOF10: 5
PAINLEVEL_OUTOF10: 8
PAINLEVEL_OUTOF10: 5
PAINLEVEL_OUTOF10: 10
PAINLEVEL_OUTOF10: 10
PAINLEVEL_OUTOF10: 8
PAINLEVEL_OUTOF10: 7
PAINLEVEL_OUTOF10: 8

## 2018-01-13 ASSESSMENT — PAIN DESCRIPTION - PROGRESSION: CLINICAL_PROGRESSION: NOT CHANGED

## 2018-01-13 ASSESSMENT — PAIN DESCRIPTION - LOCATION
LOCATION: HEAD
LOCATION: HEAD

## 2018-01-13 ASSESSMENT — PAIN DESCRIPTION - ORIENTATION: ORIENTATION: MID

## 2018-01-13 ASSESSMENT — PAIN DESCRIPTION - DESCRIPTORS
DESCRIPTORS: HEADACHE
DESCRIPTORS: HEADACHE

## 2018-01-13 ASSESSMENT — PAIN DESCRIPTION - PAIN TYPE
TYPE: ACUTE PAIN
TYPE: ACUTE PAIN

## 2018-01-13 ASSESSMENT — PAIN DESCRIPTION - FREQUENCY: FREQUENCY: CONTINUOUS

## 2018-01-13 ASSESSMENT — PAIN DESCRIPTION - ONSET: ONSET: ON-GOING

## 2018-01-13 NOTE — PROGRESS NOTES
the cognitive eval as able. Patient/family involved in developing goals and treatment plan: yes    Subjective:    General  Chart Reviewed: Yes  Family / Caregiver Present: No     Vision  Vision: Within Functional Limits  Hearing  Hearing: Within functional limits           Objective:     Oral/Motor  Oral Motor: Within functional limits    Auditory Comprehension  Comprehension: Within Functional Limits         Expression  Primary Mode of Expression: Verbal    Verbal Expression  Verbal Expression: Within functional limits         Motor Speech  Motor Speech:  Within Functional Limits         Cognition:      Orientation  Overall Orientation Status: Within Normal Limits  Attention  Attention: Within Functional Limits  Memory  Memory: Exceptions to [facility]  Working Memory: Mild (Pt very lethargic and with extreme headache)   Verbal Sequencing: WFL  Thought Organization: WFL       Prognosis:  Speech Therapy Prognosis  Prognosis: Fair  Individuals consulted  Consulted and agree with results and recommendations: Patient;RN    Education:  Patient Education: yes  Patient Education Response: Demonstrated understanding           Therapy Time:   Individual Concurrent Group Co-treatment   Time In 1         Time Out 1103         Minutes [address] [place] [address]  1/13/2018 11:15 AM

## 2018-01-13 NOTE — PROGRESS NOTES
Daily Progress Note  Neuro Critical Care      Patient Name: Jason Hare  Patient : 1975  Room/Bed: [facility]6271-69  Allergies: Allergies   Allergen Reactions    Chantix [Varenicline]      vomiting    Prozac [Fluoxetine Hcl]      cutting     Problem List:   Patient Active Problem List   Diagnosis    Tobacco use    Genital warts    Migraine    SAH (subarachnoid hemorrhage) ([facility] [address]     INTERVAL HISTORY:  The patient is a 44 y.o. Female who presented to [address] 18 with a thunderclap headache that started around 10:45 AM. She was found to have a [address] on CT head and transferred to [facility]. V's. No significant PMH. Family history of brain aneurysm in her father. Daily smoker. 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 Overnight, she had a couple episodes of hypotension, mainly after pain medication administration. She received a total of 2-1 liter boluses and 1-500 mL bolus for blood pressure control. Currently, she is alert and oriented, neuro intact. She continues to complain of a diffuse headache, 10/10 with photophobia and phonophobia, intermittent nausea and vomiting.       CURRENT MEDICATIONS:  SCHEDULED MEDICATIONS:   simvastatin  80 mg Oral Nightly    sodium chloride flush  10 mL Intravenous 2 times per day    levetiracetam  500 mg Intravenous Q12H    niMODipine  60 mg Oral 6 times per day    famotidine (PEPCID) injection  20 mg Intravenous BID     CONTINUOUS INFUSIONS:   sodium chloride 125 mL/hr at 18     PRN MEDICATIONS:   acetaminophen, sodium chloride flush, acetaminophen, ondansetron, oxyCODONE **OR** oxyCODONE, fentaNYL **OR** [DISCONTINUED] fentaNYL    VITALS:  Temperature Range: Temp: 98.1 °F (36.7 °C) Temp  Av.2 °F (36.8 °C)  Min: 98.1 °F (36.7 °C)  Max: 98.4 °F (36.9 °C)  BP Range: Systolic (34ORP), KEK:95 , Min:77 , MXS:664     Diastolic (51RIX), IYU:68, Min:40, Max:95    Pulse

## 2018-01-13 NOTE — CONSULTS
History:       Problem Relation Age of Onset    Other Mother      suicide, uterine fibroids    Alcohol Abuse Father     Other Father      brain aneurysm    Cancer Sister      thyroid    Breast Cancer Paternal Grandmother        Allergies:  Chantix [varenicline] and Prozac [fluoxetine hcl]    Home Medications:  Prior to Admission medications    Medication Sig Start Date End Date Taking?  Authorizing Provider   ipratropium (ATROVENT) 0.06 % nasal spray 2 sprays by Nasal route 4 times daily as needed for Rhinitis 11/18/17 11/18/18  Lianna Scanlon MD   albuterol sulfate HFA (VENTOLIN HFA) 108 (90 BASE) MCG/ACT inhaler Inhale 2 puffs into the lungs every 4 hours as needed for Wheezing or Shortness of Breath 5/24/17   Alda Byrnes MD       Current Medications:   Current Facility-Administered Medications: ondansetron (ZOFRAN) injection 4 mg, 4 mg, Intravenous, Q4H PRN  niMODipine (NIMOTOP) capsule 30 mg, 30 mg, Oral, Q2H  famotidine (PEPCID) injection 20 mg, 20 mg, Intravenous, BID  nicotine (NICODERM CQ) 14 MG/24HR 1 patch, 1 patch, Transdermal, Daily  docusate sodium (COLACE) capsule 100 mg, 100 mg, Oral, Daily  dexamethasone (DECADRON) injection 2 mg, 2 mg, Intravenous, Q6H  acetaminophen (TYLENOL) tablet 650 mg, 650 mg, Oral, Q4H PRN  0.9 % sodium chloride infusion, , Intravenous, Continuous  simvastatin (ZOCOR) tablet 80 mg, 80 mg, Oral, Nightly  sodium chloride flush 0.9 % injection 10 mL, 10 mL, Intravenous, 2 times per day  sodium chloride flush 0.9 % injection 10 mL, 10 mL, Intravenous, PRN  acetaminophen (TYLENOL) tablet 650 mg, 650 mg, Oral, Q4H PRN  oxyCODONE (ROXICODONE) immediate release tablet 5 mg, 5 mg, Oral, Q4H PRN **OR** oxyCODONE (ROXICODONE) immediate release tablet 10 mg, 10 mg, Oral, Q4H PRN  fentaNYL (SUBLIMAZE) injection 25 mcg, 25 mcg, Intravenous, Q1H PRN **OR** [DISCONTINUED] fentaNYL (SUBLIMAZE) injection 50 mcg, 50 mcg, Intravenous, Q1H PRN    REVIEW OF SYSTEMS:       CONSTITUTIONAL: Knee:  2+    Plantar Response:    Right:  downgoing  Left:  downgoing    Clonus:  N/A  Pyle's:  N/A    Gait:  Not assessed   SKIN: no rash       LABS AND IMAGING:     CBC with Differential:    Lab Results   Component Value Date    WBC 11.0 01/13/2018    RBC 3.17 01/13/2018    HGB 10.2 01/13/2018    HCT 31.0 01/13/2018     01/13/2018    MCV 97.8 01/13/2018    MCH 32.2 01/13/2018    MCHC 32.9 01/13/2018    RDW 12.6 01/13/2018     BMP:    Lab Results   Component Value Date     01/13/2018    K 3.6 01/13/2018     01/13/2018    CO2 16 01/13/2018    BUN 7 01/13/2018    CREATININE 0.44 01/13/2018    CALCIUM 6.3 01/13/2018    GFRAA >60 01/13/2018    LABGLOM >60 01/13/2018    GLUCOSE 100 01/13/2018       Radiology Review:      Ct Head Wo Contrast  Result Date: 1/13/2018  EXAMINATION: CT OF THE HEAD WITHOUT CONTRAST  1/13/2018 5:42 am TECHNIQUE: CT of the head was performed without the administration of intravenous contrast. Dose modulation, iterative reconstruction, and/or weight based adjustment of the mA/kV was utilized to reduce the radiation dose to as low as reasonably achievable. COMPARISON: 01/12/2018, 1156 hours HISTORY: ORDERING SYSTEM PROVIDED HISTORY: post angio, severe HA TECHNOLOGIST PROVIDED HISTORY: Has a \"code stroke\" or \"stroke alert\" been called? ->No 41-year-old female with severe headache status post angio FINDINGS: BRAIN/VENTRICLES: Foramen magnum and 4th ventricle appear patent. Ventricles are normal in size and midline in position. There is stable subarachnoid hemorrhage within the basal cisterns, right greater than left on image 21, series 2. There may be a component of subdural hemorrhage along the anterior aspect of the right temporal lobe on image 21, series 2. Diffuse subarachnoid blood is seen throughout the sulci of the right cerebral hemisphere and right insula, grossly unchanged in appearance from the previous examination. No new intracranial hemorrhage identified.

## 2018-01-13 NOTE — PROGRESS NOTES
Department of Endovascular Neurosurgery  Fellow Progress Note      SUBJECTIVE:    Vomited twice overnight. Continues to report headache. Started on dexamethasone. Nimodipine frequency decreased due to episodes of hypotension, for which she also received 2500 cc NS overnight. Occasionally bradycardic in the 30s. No bowel movements. OBJECTIVE    Physical  VITALS:  BP (!) 81/39   Pulse (!) 48   Temp 97.5 °F (36.4 °C)   Resp 15   Ht 5' 2\" (1.575 m)   Wt 136 lb (61.7 kg)   SpO2 93%   BMI 24.87 kg/m²   NEUROLOGIC:  Drowsy but easily awakes to verbal stimuli, alert, oriented to name, place and time. Cranial nerves II-XII are grossly intact. Motor is 5 out of 5 bilaterally. GROIN: c/d/i.   EXT: right pedal pulses 2+    Data  CBC:   Lab Results   Component Value Date    WBC 11.0 01/13/2018    RBC 3.17 01/13/2018    HGB 10.2 01/13/2018    HCT 31.0 01/13/2018    MCV 97.8 01/13/2018    MCH 32.2 01/13/2018    MCHC 32.9 01/13/2018    RDW 12.6 01/13/2018     01/13/2018    MPV 9.4 01/13/2018     CMP:    Lab Results   Component Value Date     01/13/2018    K 3.6 01/13/2018     01/13/2018    CO2 16 01/13/2018    BUN 7 01/13/2018    CREATININE 0.44 01/13/2018    GFRAA >60 01/13/2018    LABGLOM >60 01/13/2018    GLUCOSE 100 01/13/2018    CALCIUM 6.3 01/13/2018     Current Inpatient Medications  Current Facility-Administered Medications: calcium gluconate 1 g in dextrose 5 % 100 mL IVPB, 1 g, Intravenous, Once  ondansetron (ZOFRAN) injection 4 mg, 4 mg, Intravenous, Q4H PRN  niMODipine (NIMOTOP) capsule 30 mg, 30 mg, Oral, Q2H  famotidine (PEPCID) injection 20 mg, 20 mg, Intravenous, BID  nicotine (NICODERM CQ) 14 MG/24HR 1 patch, 1 patch, Transdermal, Daily  docusate sodium (COLACE) capsule 100 mg, 100 mg, Oral, Daily  dexamethasone (DECADRON) injection 2 mg, 2 mg, Intravenous, Q6H  acetaminophen (TYLENOL) tablet 650 mg, 650 mg, Oral, Q4H PRN  0.9 % sodium chloride infusion, , Intravenous,

## 2018-01-13 NOTE — PROGRESS NOTES
Pt had one episode of emesis  Of about 200 mls of bile. Orders received for one time dose of 4mg Zofran as pt is not due for nausea medicine per Dr. Mounika Llanes.

## 2018-01-14 LAB
ABSOLUTE EOS #: <0.03 K/UL (ref 0–0.44)
ABSOLUTE IMMATURE GRANULOCYTE: 0.07 K/UL (ref 0–0.3)
ABSOLUTE LYMPH #: 2.44 K/UL (ref 1.1–3.7)
ABSOLUTE MONO #: 0.84 K/UL (ref 0.1–1.2)
ANION GAP SERPL CALCULATED.3IONS-SCNC: 17 MMOL/L (ref 9–17)
BASOPHILS # BLD: 0 % (ref 0–2)
BASOPHILS ABSOLUTE: <0.03 K/UL (ref 0–0.2)
BUN BLDV-MCNC: 6 MG/DL (ref 6–20)
BUN/CREAT BLD: ABNORMAL (ref 9–20)
CALCIUM IONIZED: 1.14 MMOL/L (ref 1.13–1.33)
CALCIUM SERPL-MCNC: 7.6 MG/DL (ref 8.6–10.4)
CHLORIDE BLD-SCNC: 109 MMOL/L (ref 98–107)
CO2: 15 MMOL/L (ref 20–31)
CREAT SERPL-MCNC: 0.54 MG/DL (ref 0.5–0.9)
CULTURE: NO GROWTH
CULTURE: NORMAL
DIFFERENTIAL TYPE: ABNORMAL
EOSINOPHILS RELATIVE PERCENT: 0 % (ref 1–4)
GFR AFRICAN AMERICAN: >60 ML/MIN
GFR NON-AFRICAN AMERICAN: >60 ML/MIN
GFR SERPL CREATININE-BSD FRML MDRD: ABNORMAL ML/MIN/{1.73_M2}
GFR SERPL CREATININE-BSD FRML MDRD: ABNORMAL ML/MIN/{1.73_M2}
GLUCOSE BLD-MCNC: 107 MG/DL (ref 70–99)
HCT VFR BLD CALC: 32.7 % (ref 36.3–47.1)
HEMOGLOBIN: 11.1 G/DL (ref 11.9–15.1)
IMMATURE GRANULOCYTES: 1 %
LYMPHOCYTES # BLD: 17 % (ref 24–43)
Lab: NORMAL
MAGNESIUM: 1.9 MG/DL (ref 1.6–2.6)
MCH RBC QN AUTO: 31.9 PG (ref 25.2–33.5)
MCHC RBC AUTO-ENTMCNC: 33.9 G/DL (ref 28.4–34.8)
MCV RBC AUTO: 94 FL (ref 82.6–102.9)
MONOCYTES # BLD: 6 % (ref 3–12)
PDW BLD-RTO: 12.6 % (ref 11.8–14.4)
PLATELET # BLD: 240 K/UL (ref 138–453)
PLATELET ESTIMATE: ABNORMAL
PMV BLD AUTO: 9.1 FL (ref 8.1–13.5)
POTASSIUM SERPL-SCNC: 4.2 MMOL/L (ref 3.7–5.3)
RBC # BLD: 3.48 M/UL (ref 3.95–5.11)
RBC # BLD: ABNORMAL 10*6/UL
SEG NEUTROPHILS: 76 % (ref 36–65)
SEGMENTED NEUTROPHILS ABSOLUTE COUNT: 10.61 K/UL (ref 1.5–8.1)
SODIUM BLD-SCNC: 141 MMOL/L (ref 135–144)
SPECIMEN DESCRIPTION: NORMAL
STATUS: NORMAL
WBC # BLD: 14 K/UL (ref 3.5–11.3)
WBC # BLD: ABNORMAL 10*3/UL

## 2018-01-14 PROCEDURE — G8978 MOBILITY CURRENT STATUS: HCPCS

## 2018-01-14 PROCEDURE — 6360000002 HC RX W HCPCS: Performed by: NURSE PRACTITIONER

## 2018-01-14 PROCEDURE — 99233 SBSQ HOSP IP/OBS HIGH 50: CPT | Performed by: PSYCHIATRY & NEUROLOGY

## 2018-01-14 PROCEDURE — 36415 COLL VENOUS BLD VENIPUNCTURE: CPT

## 2018-01-14 PROCEDURE — G8979 MOBILITY GOAL STATUS: HCPCS

## 2018-01-14 PROCEDURE — 6370000000 HC RX 637 (ALT 250 FOR IP): Performed by: NURSE PRACTITIONER

## 2018-01-14 PROCEDURE — 94762 N-INVAS EAR/PLS OXIMTRY CONT: CPT

## 2018-01-14 PROCEDURE — 97165 OT EVAL LOW COMPLEX 30 MIN: CPT

## 2018-01-14 PROCEDURE — 85025 COMPLETE CBC W/AUTO DIFF WBC: CPT

## 2018-01-14 PROCEDURE — 80048 BASIC METABOLIC PNL TOTAL CA: CPT

## 2018-01-14 PROCEDURE — 97530 THERAPEUTIC ACTIVITIES: CPT

## 2018-01-14 PROCEDURE — 97127 HC SP THER IVNTJ W/FOCUS COG FUNCJ: CPT

## 2018-01-14 PROCEDURE — G8988 SELF CARE GOAL STATUS: HCPCS

## 2018-01-14 PROCEDURE — S0028 INJECTION, FAMOTIDINE, 20 MG: HCPCS | Performed by: NURSE PRACTITIONER

## 2018-01-14 PROCEDURE — 99291 CRITICAL CARE FIRST HOUR: CPT | Performed by: PSYCHIATRY & NEUROLOGY

## 2018-01-14 PROCEDURE — 82330 ASSAY OF CALCIUM: CPT

## 2018-01-14 PROCEDURE — G8987 SELF CARE CURRENT STATUS: HCPCS

## 2018-01-14 PROCEDURE — 97535 SELF CARE MNGMENT TRAINING: CPT

## 2018-01-14 PROCEDURE — 2500000003 HC RX 250 WO HCPCS: Performed by: NURSE PRACTITIONER

## 2018-01-14 PROCEDURE — 6370000000 HC RX 637 (ALT 250 FOR IP): Performed by: EMERGENCY MEDICINE

## 2018-01-14 PROCEDURE — 97162 PT EVAL MOD COMPLEX 30 MIN: CPT

## 2018-01-14 PROCEDURE — 2000000003 HC NEURO ICU R&B

## 2018-01-14 PROCEDURE — 83735 ASSAY OF MAGNESIUM: CPT

## 2018-01-14 PROCEDURE — 2580000003 HC RX 258: Performed by: EMERGENCY MEDICINE

## 2018-01-14 PROCEDURE — 6370000000 HC RX 637 (ALT 250 FOR IP): Performed by: PSYCHIATRY & NEUROLOGY

## 2018-01-14 PROCEDURE — 6360000002 HC RX W HCPCS: Performed by: EMERGENCY MEDICINE

## 2018-01-14 RX ORDER — POLYETHYLENE GLYCOL 3350 17 G/17G
17 POWDER, FOR SOLUTION ORAL DAILY
Status: DISCONTINUED | OUTPATIENT
Start: 2018-01-14 | End: 2018-01-14

## 2018-01-14 RX ORDER — POLYETHYLENE GLYCOL 3350 17 G/17G
17 POWDER, FOR SOLUTION ORAL DAILY
Status: DISCONTINUED | OUTPATIENT
Start: 2018-01-15 | End: 2018-01-26 | Stop reason: HOSPADM

## 2018-01-14 RX ORDER — CYCLOBENZAPRINE HCL 10 MG
10 TABLET ORAL ONCE
Status: COMPLETED | OUTPATIENT
Start: 2018-01-14 | End: 2018-01-14

## 2018-01-14 RX ORDER — NIMODIPINE 30 MG/1
60 CAPSULE, LIQUID FILLED ORAL
Status: DISCONTINUED | OUTPATIENT
Start: 2018-01-14 | End: 2018-01-26 | Stop reason: HOSPADM

## 2018-01-14 RX ORDER — CYCLOBENZAPRINE HCL 10 MG
10 TABLET ORAL 3 TIMES DAILY PRN
Status: DISCONTINUED | OUTPATIENT
Start: 2018-01-14 | End: 2018-01-26 | Stop reason: HOSPADM

## 2018-01-14 RX ADMIN — CYCLOBENZAPRINE HYDROCHLORIDE 10 MG: 5 TABLET, FILM COATED ORAL at 14:03

## 2018-01-14 RX ADMIN — ONDANSETRON 4 MG: 2 INJECTION INTRAMUSCULAR; INTRAVENOUS at 08:09

## 2018-01-14 RX ADMIN — NIMODIPINE 60 MG: 30 CAPSULE, LIQUID FILLED ORAL at 16:11

## 2018-01-14 RX ADMIN — OXYCODONE HYDROCHLORIDE 10 MG: 5 TABLET ORAL at 20:25

## 2018-01-14 RX ADMIN — DEXAMETHASONE SODIUM PHOSPHATE 2 MG: 4 INJECTION, SOLUTION INTRAMUSCULAR; INTRAVENOUS at 16:11

## 2018-01-14 RX ADMIN — FENTANYL CITRATE 25 MCG: 50 INJECTION, SOLUTION INTRAMUSCULAR; INTRAVENOUS at 03:04

## 2018-01-14 RX ADMIN — OXYCODONE HYDROCHLORIDE 10 MG: 5 TABLET ORAL at 16:12

## 2018-01-14 RX ADMIN — FAMOTIDINE 20 MG: 10 INJECTION, SOLUTION INTRAVENOUS at 20:24

## 2018-01-14 RX ADMIN — DOCUSATE SODIUM 100 MG: 100 CAPSULE, LIQUID FILLED ORAL at 08:12

## 2018-01-14 RX ADMIN — ONDANSETRON 4 MG: 2 INJECTION INTRAMUSCULAR; INTRAVENOUS at 20:24

## 2018-01-14 RX ADMIN — NIMODIPINE 60 MG: 30 CAPSULE, LIQUID FILLED ORAL at 12:13

## 2018-01-14 RX ADMIN — ONDANSETRON 4 MG: 2 INJECTION INTRAMUSCULAR; INTRAVENOUS at 00:30

## 2018-01-14 RX ADMIN — OXYCODONE HYDROCHLORIDE 10 MG: 5 TABLET ORAL at 12:13

## 2018-01-14 RX ADMIN — ONDANSETRON 4 MG: 2 INJECTION INTRAMUSCULAR; INTRAVENOUS at 12:12

## 2018-01-14 RX ADMIN — CYCLOBENZAPRINE HYDROCHLORIDE 10 MG: 10 TABLET, FILM COATED ORAL at 08:10

## 2018-01-14 RX ADMIN — FAMOTIDINE 20 MG: 10 INJECTION, SOLUTION INTRAVENOUS at 08:09

## 2018-01-14 RX ADMIN — Medication 10 ML: at 20:25

## 2018-01-14 RX ADMIN — NIMODIPINE 60 MG: 30 CAPSULE, LIQUID FILLED ORAL at 20:25

## 2018-01-14 RX ADMIN — Medication 10 ML: at 08:12

## 2018-01-14 RX ADMIN — FENTANYL CITRATE 25 MCG: 50 INJECTION, SOLUTION INTRAMUSCULAR; INTRAVENOUS at 19:03

## 2018-01-14 RX ADMIN — ONDANSETRON 4 MG: 2 INJECTION INTRAMUSCULAR; INTRAVENOUS at 04:33

## 2018-01-14 RX ADMIN — DEXAMETHASONE SODIUM PHOSPHATE 2 MG: 4 INJECTION, SOLUTION INTRAMUSCULAR; INTRAVENOUS at 12:13

## 2018-01-14 RX ADMIN — DEXAMETHASONE SODIUM PHOSPHATE 2 MG: 4 INJECTION, SOLUTION INTRAMUSCULAR; INTRAVENOUS at 06:00

## 2018-01-14 RX ADMIN — NIMODIPINE 60 MG: 30 CAPSULE, LIQUID FILLED ORAL at 04:32

## 2018-01-14 RX ADMIN — OXYCODONE HYDROCHLORIDE 10 MG: 5 TABLET ORAL at 00:27

## 2018-01-14 RX ADMIN — OXYCODONE HYDROCHLORIDE 10 MG: 5 TABLET ORAL at 08:10

## 2018-01-14 RX ADMIN — OXYCODONE HYDROCHLORIDE 10 MG: 5 TABLET ORAL at 04:33

## 2018-01-14 RX ADMIN — CYCLOBENZAPRINE HYDROCHLORIDE 10 MG: 5 TABLET, FILM COATED ORAL at 20:34

## 2018-01-14 RX ADMIN — NIMODIPINE 60 MG: 30 CAPSULE, LIQUID FILLED ORAL at 08:09

## 2018-01-14 RX ADMIN — DEXAMETHASONE SODIUM PHOSPHATE 2 MG: 4 INJECTION, SOLUTION INTRAMUSCULAR; INTRAVENOUS at 23:09

## 2018-01-14 RX ADMIN — NIMODIPINE 30 MG: 30 CAPSULE, LIQUID FILLED ORAL at 00:27

## 2018-01-14 ASSESSMENT — PAIN DESCRIPTION - FREQUENCY: FREQUENCY: CONTINUOUS

## 2018-01-14 ASSESSMENT — PAIN DESCRIPTION - PROGRESSION: CLINICAL_PROGRESSION: NOT CHANGED

## 2018-01-14 ASSESSMENT — PAIN DESCRIPTION - LOCATION
LOCATION: HEAD
LOCATION: HEAD

## 2018-01-14 ASSESSMENT — PAIN DESCRIPTION - PAIN TYPE
TYPE: ACUTE PAIN

## 2018-01-14 ASSESSMENT — PAIN SCALES - GENERAL
PAINLEVEL_OUTOF10: 7
PAINLEVEL_OUTOF10: 7
PAINLEVEL_OUTOF10: 5
PAINLEVEL_OUTOF10: 5
PAINLEVEL_OUTOF10: 3
PAINLEVEL_OUTOF10: 7
PAINLEVEL_OUTOF10: 6
PAINLEVEL_OUTOF10: 6
PAINLEVEL_OUTOF10: 8
PAINLEVEL_OUTOF10: 6
PAINLEVEL_OUTOF10: 2
PAINLEVEL_OUTOF10: 3
PAINLEVEL_OUTOF10: 4

## 2018-01-14 ASSESSMENT — PAIN DESCRIPTION - ORIENTATION
ORIENTATION: MID
ORIENTATION: POSTERIOR

## 2018-01-14 ASSESSMENT — PAIN DESCRIPTION - DESCRIPTORS: DESCRIPTORS: HEADACHE

## 2018-01-14 ASSESSMENT — PAIN DESCRIPTION - ONSET: ONSET: ON-GOING

## 2018-01-14 NOTE — PROGRESS NOTES
for rehabilitation services?: Yes  Family / Caregiver Present: No  Pain Assessment  Patient Currently in Pain: Yes  Pain Assessment: 0-10  Pain Level: 5  Pain Type: Acute pain  Pain Location: Head  Pain Orientation: Mid  Pain Descriptors: Headache  Pain Frequency: Continuous  Clinical Progression: Not changed  Patient's Stated Pain Goal: No pain  Pain Intervention(s): Medication (see eMar), Repositioned, Ambulation/Increased activity, Distraction  Response to Pain Intervention: Patient Satisfied  Oxygen Therapy  SpO2: 99 %  Pulse Oximeter Device Mode: Continuous  O2 Device: None (Room air)  Social/Functional History  Social/Functional History  Lives With: Significant other  Type of Home: House  Home Layout: Multi-level (3 stories, 1/2 bath on main floor, bedroom and full bath on 3rd floor)  Home Access: Stairs to enter without rails  Entrance Stairs - Number of Steps: 7  Bathroom Shower/Tub: Tub/Shower unit  Bathroom Toilet: Standard  Receives Help From: Family  ADL Assistance: Independent  Homemaking Assistance: Independent  Homemaking Responsibilities: Yes  Ambulation Assistance: Independent  Transfer Assistance: Independent  Active : Yes  Mode of Transportation: Car  Occupation: Full time employment  Type of occupation: MRI technologist       Objective   Vision: Within Functional Limits  Hearing: Within functional limits    Orientation  Overall Orientation Status: Within Functional Limits     Balance  Sitting Balance: Supervision  Standing Balance: Contact guard assistance  Standing Balance  Time: ~3min  Activity: functional mobility, static standing  Sit to stand: Contact guard assistance  Stand to sit: Contact guard assistance  Comment: no LOB, no dizziness  Functional Mobility  Functional - Mobility Device: No device  Activity: Other  Assist Level: Contact guard assistance  Functional Mobility Comments: from EOB to bedside chair  ADL  Feeding: Independent  Grooming: Independent  UE Bathing: Setup  LE Bathing: Setup;Supervision  UE Dressing: Setup  LE Dressing: Setup;Supervision  Toileting: Setup;Supervision  Tone RUE  RUE Tone: Normotonic  Tone LUE  LUE Tone: Normotonic  Coordination  Movements Are Fluid And Coordinated: Yes     Bed mobility  Supine to Sit: Supervision  Scooting: Supervision  Comment: Pt retired to chair  Transfers  Stand Step Transfers: Contact guard assistance  Sit to stand: Contact guard assistance  Stand to sit: Contact guard assistance     Cognition  Overall Cognitive Status: WFL        Sensation  Overall Sensation Status: WFL        LUE AROM (degrees)  LUE AROM : WFL  Left Hand AROM (degrees)  Left Hand AROM: WFL  RUE AROM (degrees)  RUE AROM : WFL  Right Hand AROM (degrees)  Right Hand AROM: WFL  LUE Strength  Gross LUE Strength: WFL  L Hand Grasp: 5/5  RUE Strength  Gross RUE Strength: WFL  R Hand Grasp: 5/5       Assessment   Performance deficits / Impairments: Decreased functional mobility ; Decreased ADL status; Decreased high-level IADLs;Decreased endurance  Assessment: Pt supine in bed upon arrival. Pt demo supine-sit, sit-stand, stand-step to bedside chair, stand-sit, grooming, oral care, with assist level listed above. Pt seated in chair with call light in reach and RN informed upon exit. Treatment Diagnosis: SAH  Prognosis: Good  Decision Making: Low Complexity  Patient Education: OT POC, transition of care, benefit of activity, good return  REQUIRES OT FOLLOW UP: Yes  Activity Tolerance  Activity Tolerance: Patient Tolerated treatment well  Safety Devices  Safety Devices in place: Yes  Type of devices: Call light within reach; Left in chair;Patient at risk for falls;Nurse notified         Plan   Plan  Times per week: 1-2 sessions only    G-Code  OT G-codes  Functional Assessment Tool Used: [facility] ([facility])  Score: 19/24  Functional Limitation: Self care  Self Care Current Status ():  At least 40 percent but less than 60 percent impaired, limited or restricted  Self Care Goal

## 2018-01-14 NOTE — PROGRESS NOTES
Daily Progress Note  Neuro Critical Care      Patient Name: Owen Thomas  Patient : 1975  Room/Bed: [facility]/3175-07  Allergies: Allergies   Allergen Reactions    Chantix [Varenicline]      vomiting    Prozac [Fluoxetine Hcl]      cutting     Problem List:   Patient Active Problem List   Diagnosis    Tobacco use    Genital warts    Migraine    SAH (subarachnoid hemorrhage) (HCC)    Aneurysmal subarachnoid hemorrhage (HCC)     INTERVAL HISTORY:  The patient is a 44 y.o. Female who presented to [address] with a thunderclap headache that started around 10:45 AM. She was found to have a [address] on CT head and transferred to [facility]. V's. No significant PMH. Family history of brain aneurysm in her father. Daily smoker. 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  CTH stable right sided predominant subarachnoid hemorrhage in basal cisterns    Currently, she is alert and oriented, neuro intact. She reports today that her headache and photophobia is improved; however her neck pain, phonophobia, and nausea remain. She was started on Decadron yesterday. She was able to eat a small amount for breakfast this morning.     CURRENT MEDICATIONS:  SCHEDULED MEDICATIONS:   niMODipine  60 mg Oral 6 times per day    famotidine (PEPCID) injection  20 mg Intravenous BID    nicotine  1 patch Transdermal Daily    docusate sodium  100 mg Oral Daily    dexamethasone  2 mg Intravenous Q6H    simvastatin  80 mg Oral Nightly    sodium chloride flush  10 mL Intravenous 2 times per day     CONTINUOUS INFUSIONS:   sodium chloride 125 mL/hr at 18     PRN MEDICATIONS:   ondansetron, magnesium hydroxide, acetaminophen, sodium chloride flush, acetaminophen, oxyCODONE **OR** oxyCODONE, fentaNYL **OR** [DISCONTINUED] fentaNYL    VITALS:  Temperature Range: Temp: 98.1 °F (36.7 °C) Temp  Av.4 °F (36.9 °C)  Min: 98.1 °F (36.7 °C)  Max: 98.6 °F (37 °C)  BP Range: Systolic (08FYX), USX:45 , Min:76 , DLD:317     Diastolic (61RDR), BXB:27, Min:32, Max:68    Pulse Range: Pulse  Av.9  Min: 41  Max: 69  Respiration Range: Resp  Av.2  Min: 14  Max: 22  Current Pulse Ox: SpO2: 98 %  24HR Pulse Ox Range: SpO2  Av.9 %  Min: 93 %  Max: 98 %  Patient Vitals for the past 12 hrs:   BP Temp Temp src Pulse Resp SpO2   18 0603 98/66 - - (!) 46 15 -   18 0505 (!) 102/52 - - 59 19 98 %   18 0405 (!) 93/50 98.1 °F (36.7 °C) Oral 56 16 95 %   18 0305 98/60 - - 58 22 96 %   18 0105 98/68 - - 52 17 93 %   18 0005 99/67 98.6 °F (37 °C) Oral 60 19 96 %   18 2305 95/60 - - 51 19 96 %   18 2202 (!) 96/54 - - 54 19 96 %     Estimated body mass index is 24.87 kg/m² as calculated from the following:    Height as of this encounter: 5' 2\" (1.575 m).     Weight as of this encounter: 136 lb (61.7 kg).  []<16 Severe malnutrition  []1616.99 Moderate malnutrition  []1718.49 Mild malnutrition  [x]18.524.9 Normal  []2529.9 Overweight (not obese)  []3034.9 Obese class 1 (Low Risk)  []3539.9 Obese class 2 (Moderate Risk)  []?40 Obese class 3 (High Risk)    RECENT LABS:   Lab Results   Component Value Date    WBC 14.0 (H) 2018    HGB 11.1 (L) 2018    HCT 32.7 (L) 2018     2018    CHOL 85 2018    TRIG 78 2018    HDL 32 (L) 2018     2018    K 4.2 2018     (H) 2018    CREATININE 0.54 2018    BUN 6 2018    CO2 15 (L) 2018     24 HOUR INTAKE/OUTPUT:    Intake/Output Summary (Last 24 hours) at 18 0911  Last data filed at 18 0437   Gross per 24 hour   Intake             3227 ml   Output             3045 ml   Net              182 ml       RADIOLOGY:   Labs and Images reviewed with:  [x] Karolyn Fuentes MD         [] Paula Ybarra MD     [] Ashlie Chaves MD  [] Thu Hancock MD  [] Lalito Finn MD  [] Rg Rodriguez MD Discontinue Keppra as aneurysm is now secured    CARDIOVASCULAR:  - SBP less than 120  - F/U troponin and EKG  - If troponin or EKG abnormal, will order Echo  - F/U lipid panel  - Continue telemetry  - Discontinue zocor; LDL 37    PULMONARY:  - Maintaining O2 saturation on room air  - Continue to monitor    RENAL/FLUID/ELECTROLYTE:  - UOP 2.2 mL/kg/hr  - BUN 4 / Creatinine 0.54  - NS @ 75 mL/hr  - Daily BMP  - Replace electrolytes prn    GI/NUTRITION:  NUTRITION:  DIET CLEAR LIQUID;  - Zofran 4 mg q6h prn  - Pepcid 20 mg BID  - Colace daily  - Milk of Magnesia prn for constipation  - No BM x 2 days, Miralax daily    ID:  - Tmax 98.6  - WBC 14, increased from yesterday, continue to trend  - Daily CBC  - Monitor for fevers    HEMATOLOGIC:  - Hematocrit 32.7 / Hemoglobin 11.1  - Platelets 907  - Daily CBC    ENDOCRINE:  - monitor blood glucose  - Blood glucose less than 180    OTHER:  - PT/OT  - Nicotine patch    PROPHYLAXIS:  Stress ulcer: PPI    DVT PROPHYLAXIS:  - SCD sleeves - Thigh High   - AIDAN stockings - Thigh High  - No chemoprophylaxis anticoagulation at this time. DISPOSITION:  [x] To remain ICU  [] OK for out of ICU from Neuro Critical Care standpoint    We will continue to follow along. Please contact neuro critical care with any changes in exam or patient status.         Stacey Giron CNP  Neuro Critical Care  Phone [phone]  1/14/2018     9:11 AM

## 2018-01-15 LAB
ABSOLUTE EOS #: <0.03 K/UL (ref 0–0.44)
ABSOLUTE IMMATURE GRANULOCYTE: 0.05 K/UL (ref 0–0.3)
ABSOLUTE LYMPH #: 2.94 K/UL (ref 1.1–3.7)
ABSOLUTE MONO #: 0.93 K/UL (ref 0.1–1.2)
ANION GAP SERPL CALCULATED.3IONS-SCNC: 12 MMOL/L (ref 9–17)
BASOPHILS # BLD: 0 % (ref 0–2)
BASOPHILS ABSOLUTE: <0.03 K/UL (ref 0–0.2)
BUN BLDV-MCNC: 8 MG/DL (ref 6–20)
BUN/CREAT BLD: ABNORMAL (ref 9–20)
CALCIUM IONIZED: 1.29 MMOL/L (ref 1.13–1.33)
CALCIUM SERPL-MCNC: 8.9 MG/DL (ref 8.6–10.4)
CHLORIDE BLD-SCNC: 114 MMOL/L (ref 98–107)
CO2: 17 MMOL/L (ref 20–31)
CREAT SERPL-MCNC: 0.69 MG/DL (ref 0.5–0.9)
DIFFERENTIAL TYPE: ABNORMAL
EKG ATRIAL RATE: 43 BPM
EKG ATRIAL RATE: 44 BPM
EKG P AXIS: 36 DEGREES
EKG P AXIS: 48 DEGREES
EKG P-R INTERVAL: 170 MS
EKG P-R INTERVAL: 174 MS
EKG Q-T INTERVAL: 458 MS
EKG Q-T INTERVAL: 494 MS
EKG QRS DURATION: 58 MS
EKG QRS DURATION: 68 MS
EKG QTC CALCULATION (BAZETT): 387 MS
EKG QTC CALCULATION (BAZETT): 422 MS
EKG R AXIS: 31 DEGREES
EKG R AXIS: 42 DEGREES
EKG T AXIS: 45 DEGREES
EKG T AXIS: 62 DEGREES
EKG VENTRICULAR RATE: 43 BPM
EKG VENTRICULAR RATE: 44 BPM
EOSINOPHILS RELATIVE PERCENT: 0 % (ref 1–4)
GFR AFRICAN AMERICAN: >60 ML/MIN
GFR NON-AFRICAN AMERICAN: >60 ML/MIN
GFR SERPL CREATININE-BSD FRML MDRD: ABNORMAL ML/MIN/{1.73_M2}
GFR SERPL CREATININE-BSD FRML MDRD: ABNORMAL ML/MIN/{1.73_M2}
GLUCOSE BLD-MCNC: 116 MG/DL (ref 70–99)
GLUCOSE BLD-MCNC: 130 MG/DL (ref 65–105)
HCT VFR BLD CALC: 30.5 % (ref 36.3–47.1)
HEMOGLOBIN: 10.1 G/DL (ref 11.9–15.1)
IMMATURE GRANULOCYTES: 0 %
LYMPHOCYTES # BLD: 23 % (ref 24–43)
MAGNESIUM: 1.9 MG/DL (ref 1.6–2.6)
MCH RBC QN AUTO: 31.6 PG (ref 25.2–33.5)
MCHC RBC AUTO-ENTMCNC: 33.1 G/DL (ref 28.4–34.8)
MCV RBC AUTO: 95.3 FL (ref 82.6–102.9)
MONOCYTES # BLD: 7 % (ref 3–12)
PDW BLD-RTO: 12.6 % (ref 11.8–14.4)
PLATELET # BLD: 199 K/UL (ref 138–453)
PLATELET ESTIMATE: ABNORMAL
PMV BLD AUTO: 9.1 FL (ref 8.1–13.5)
POTASSIUM SERPL-SCNC: 3.6 MMOL/L (ref 3.7–5.3)
RBC # BLD: 3.2 M/UL (ref 3.95–5.11)
RBC # BLD: ABNORMAL 10*6/UL
SEG NEUTROPHILS: 70 % (ref 36–65)
SEGMENTED NEUTROPHILS ABSOLUTE COUNT: 8.59 K/UL (ref 1.5–8.1)
SODIUM BLD-SCNC: 143 MMOL/L (ref 135–144)
TROPONIN INTERP: NORMAL
TROPONIN T: <0.03 NG/ML
WBC # BLD: 12.5 K/UL (ref 3.5–11.3)
WBC # BLD: ABNORMAL 10*3/UL

## 2018-01-15 PROCEDURE — 82330 ASSAY OF CALCIUM: CPT

## 2018-01-15 PROCEDURE — 2000000003 HC NEURO ICU R&B

## 2018-01-15 PROCEDURE — 93005 ELECTROCARDIOGRAM TRACING: CPT

## 2018-01-15 PROCEDURE — S0028 INJECTION, FAMOTIDINE, 20 MG: HCPCS | Performed by: NURSE PRACTITIONER

## 2018-01-15 PROCEDURE — 6370000000 HC RX 637 (ALT 250 FOR IP): Performed by: NURSE PRACTITIONER

## 2018-01-15 PROCEDURE — 6360000002 HC RX W HCPCS: Performed by: NURSE PRACTITIONER

## 2018-01-15 PROCEDURE — 84484 ASSAY OF TROPONIN QUANT: CPT

## 2018-01-15 PROCEDURE — 80048 BASIC METABOLIC PNL TOTAL CA: CPT

## 2018-01-15 PROCEDURE — 97116 GAIT TRAINING THERAPY: CPT

## 2018-01-15 PROCEDURE — 82947 ASSAY GLUCOSE BLOOD QUANT: CPT

## 2018-01-15 PROCEDURE — 6360000002 HC RX W HCPCS: Performed by: EMERGENCY MEDICINE

## 2018-01-15 PROCEDURE — 2500000003 HC RX 250 WO HCPCS: Performed by: NURSE PRACTITIONER

## 2018-01-15 PROCEDURE — 6370000000 HC RX 637 (ALT 250 FOR IP): Performed by: EMERGENCY MEDICINE

## 2018-01-15 PROCEDURE — 94762 N-INVAS EAR/PLS OXIMTRY CONT: CPT

## 2018-01-15 PROCEDURE — 6360000002 HC RX W HCPCS

## 2018-01-15 PROCEDURE — 93886 INTRACRANIAL COMPLETE STUDY: CPT

## 2018-01-15 PROCEDURE — 36415 COLL VENOUS BLD VENIPUNCTURE: CPT

## 2018-01-15 PROCEDURE — 2580000003 HC RX 258: Performed by: EMERGENCY MEDICINE

## 2018-01-15 PROCEDURE — 99232 SBSQ HOSP IP/OBS MODERATE 35: CPT | Performed by: PSYCHIATRY & NEUROLOGY

## 2018-01-15 PROCEDURE — 85025 COMPLETE CBC W/AUTO DIFF WBC: CPT

## 2018-01-15 PROCEDURE — 97110 THERAPEUTIC EXERCISES: CPT

## 2018-01-15 PROCEDURE — 99233 SBSQ HOSP IP/OBS HIGH 50: CPT | Performed by: PSYCHIATRY & NEUROLOGY

## 2018-01-15 PROCEDURE — 93886 INTRACRANIAL COMPLETE STUDY: CPT | Performed by: PSYCHIATRY & NEUROLOGY

## 2018-01-15 PROCEDURE — 76937 US GUIDE VASCULAR ACCESS: CPT

## 2018-01-15 PROCEDURE — 83735 ASSAY OF MAGNESIUM: CPT

## 2018-01-15 RX ORDER — CALCIUM GLUCONATE 94 MG/ML
2 INJECTION, SOLUTION INTRAVENOUS ONCE
Status: DISCONTINUED | OUTPATIENT
Start: 2018-01-15 | End: 2018-01-15

## 2018-01-15 RX ORDER — NAPROXEN SODIUM 220 MG
220 TABLET ORAL
Status: ON HOLD | COMMUNITY
End: 2018-01-26 | Stop reason: HOSPADM

## 2018-01-15 RX ORDER — ATROPINE SULFATE 0.1 MG/ML
0.5 INJECTION INTRAVENOUS ONCE
Status: COMPLETED | OUTPATIENT
Start: 2018-01-15 | End: 2018-01-15

## 2018-01-15 RX ORDER — GABAPENTIN 100 MG/1
100 CAPSULE ORAL 3 TIMES DAILY
Status: DISCONTINUED | OUTPATIENT
Start: 2018-01-15 | End: 2018-01-18

## 2018-01-15 RX ADMIN — ATROPINE SULFATE 0.5 MG: 0.1 INJECTION PARENTERAL at 05:39

## 2018-01-15 RX ADMIN — CYCLOBENZAPRINE HYDROCHLORIDE 10 MG: 5 TABLET, FILM COATED ORAL at 08:53

## 2018-01-15 RX ADMIN — ONDANSETRON 4 MG: 2 INJECTION INTRAMUSCULAR; INTRAVENOUS at 04:22

## 2018-01-15 RX ADMIN — DEXAMETHASONE SODIUM PHOSPHATE 2 MG: 4 INJECTION, SOLUTION INTRAMUSCULAR; INTRAVENOUS at 17:39

## 2018-01-15 RX ADMIN — NIMODIPINE 60 MG: 30 CAPSULE, LIQUID FILLED ORAL at 08:40

## 2018-01-15 RX ADMIN — OXYCODONE HYDROCHLORIDE 10 MG: 5 TABLET ORAL at 08:41

## 2018-01-15 RX ADMIN — FENTANYL CITRATE 25 MCG: 50 INJECTION, SOLUTION INTRAMUSCULAR; INTRAVENOUS at 02:07

## 2018-01-15 RX ADMIN — FENTANYL CITRATE 25 MCG: 50 INJECTION, SOLUTION INTRAMUSCULAR; INTRAVENOUS at 23:47

## 2018-01-15 RX ADMIN — NIMODIPINE 60 MG: 30 CAPSULE, LIQUID FILLED ORAL at 04:22

## 2018-01-15 RX ADMIN — Medication 10 ML: at 20:26

## 2018-01-15 RX ADMIN — NIMODIPINE 60 MG: 30 CAPSULE, LIQUID FILLED ORAL at 16:14

## 2018-01-15 RX ADMIN — ONDANSETRON 4 MG: 2 INJECTION INTRAMUSCULAR; INTRAVENOUS at 17:50

## 2018-01-15 RX ADMIN — DEXAMETHASONE SODIUM PHOSPHATE 2 MG: 4 INJECTION, SOLUTION INTRAMUSCULAR; INTRAVENOUS at 23:42

## 2018-01-15 RX ADMIN — CALCIUM GLUCONATE 2 G: 98 INJECTION, SOLUTION INTRAVENOUS at 04:22

## 2018-01-15 RX ADMIN — NIMODIPINE 60 MG: 30 CAPSULE, LIQUID FILLED ORAL at 23:42

## 2018-01-15 RX ADMIN — DEXAMETHASONE SODIUM PHOSPHATE 2 MG: 4 INJECTION, SOLUTION INTRAMUSCULAR; INTRAVENOUS at 06:38

## 2018-01-15 RX ADMIN — FAMOTIDINE 20 MG: 10 INJECTION, SOLUTION INTRAVENOUS at 20:26

## 2018-01-15 RX ADMIN — Medication 10 ML: at 08:40

## 2018-01-15 RX ADMIN — GABAPENTIN 100 MG: 100 CAPSULE ORAL at 20:39

## 2018-01-15 RX ADMIN — CYCLOBENZAPRINE HYDROCHLORIDE 10 MG: 5 TABLET, FILM COATED ORAL at 16:14

## 2018-01-15 RX ADMIN — OXYCODONE HYDROCHLORIDE 10 MG: 5 TABLET ORAL at 17:38

## 2018-01-15 RX ADMIN — FAMOTIDINE 20 MG: 10 INJECTION, SOLUTION INTRAVENOUS at 08:38

## 2018-01-15 RX ADMIN — ONDANSETRON 4 MG: 2 INJECTION INTRAMUSCULAR; INTRAVENOUS at 21:50

## 2018-01-15 RX ADMIN — GABAPENTIN 100 MG: 100 CAPSULE ORAL at 13:14

## 2018-01-15 RX ADMIN — NIMODIPINE 60 MG: 30 CAPSULE, LIQUID FILLED ORAL at 11:40

## 2018-01-15 RX ADMIN — FENTANYL CITRATE 25 MCG: 50 INJECTION, SOLUTION INTRAMUSCULAR; INTRAVENOUS at 16:14

## 2018-01-15 RX ADMIN — CYCLOBENZAPRINE HYDROCHLORIDE 10 MG: 5 TABLET, FILM COATED ORAL at 20:26

## 2018-01-15 RX ADMIN — NIMODIPINE 60 MG: 30 CAPSULE, LIQUID FILLED ORAL at 00:25

## 2018-01-15 RX ADMIN — OXYCODONE HYDROCHLORIDE 10 MG: 5 TABLET ORAL at 13:10

## 2018-01-15 RX ADMIN — POLYETHYLENE GLYCOL 3350 17 G: 17 POWDER, FOR SOLUTION ORAL at 08:40

## 2018-01-15 RX ADMIN — OXYCODONE HYDROCHLORIDE 10 MG: 5 TABLET ORAL at 00:25

## 2018-01-15 RX ADMIN — OXYCODONE HYDROCHLORIDE 10 MG: 5 TABLET ORAL at 21:45

## 2018-01-15 RX ADMIN — OXYCODONE HYDROCHLORIDE 10 MG: 5 TABLET ORAL at 04:25

## 2018-01-15 RX ADMIN — ONDANSETRON 4 MG: 2 INJECTION INTRAMUSCULAR; INTRAVENOUS at 00:25

## 2018-01-15 RX ADMIN — DEXAMETHASONE SODIUM PHOSPHATE 2 MG: 4 INJECTION, SOLUTION INTRAMUSCULAR; INTRAVENOUS at 11:39

## 2018-01-15 RX ADMIN — NIMODIPINE 60 MG: 30 CAPSULE, LIQUID FILLED ORAL at 20:26

## 2018-01-15 ASSESSMENT — PAIN DESCRIPTION - PAIN TYPE
TYPE: ACUTE PAIN
TYPE: ACUTE PAIN

## 2018-01-15 ASSESSMENT — PAIN DESCRIPTION - LOCATION
LOCATION: HEAD
LOCATION: HEAD

## 2018-01-15 ASSESSMENT — PAIN DESCRIPTION - DESCRIPTORS
DESCRIPTORS: HEADACHE
DESCRIPTORS: HEADACHE

## 2018-01-15 ASSESSMENT — PAIN SCALES - GENERAL
PAINLEVEL_OUTOF10: 7
PAINLEVEL_OUTOF10: 8
PAINLEVEL_OUTOF10: 9
PAINLEVEL_OUTOF10: 7
PAINLEVEL_OUTOF10: 7
PAINLEVEL_OUTOF10: 8
PAINLEVEL_OUTOF10: 4
PAINLEVEL_OUTOF10: 7
PAINLEVEL_OUTOF10: 8
PAINLEVEL_OUTOF10: 6

## 2018-01-15 NOTE — PROGRESS NOTES
Physical Therapy  Facility/Department: [address]  Daily Treatment Note  NAME: Raeann Gray  : 1975  MRN: [id number]    Date of Service: 1/15/2018    Patient Diagnosis(es):   Patient Active Problem List    Diagnosis Date Noted    Generalized headaches     Intracranial aneurysm     Ruptured middle cerebral artery aneurysm (HCC)     Aneurysmal subarachnoid hemorrhage ([address]     [facility] 2018    Tobacco use 2017    Genital warts 2017    Migraine        Past Medical History:   Diagnosis Date    Caffeinism     Pop and Coffee    Generalized headaches     Low back pain     Migraine     Migraines stopped after birth of first daughter in [address] Patient in clinical research study 2018    TARGET: Consented 2018, Expected Completion Date 2018    Recurrent sinus infections 2017    Ruptured middle cerebral artery aneurysm ([facility] 2018    Right MCA    SAH (subarachnoid hemorrhage) ([facility] 2018    Tobacco use      Past Surgical History:   Procedure Laterality Date    BRAIN ANEURYSM SURGERY  2018    coil ablation ; MCA     OTHER SURGICAL HISTORY  2013    Pelvic Ablasion    TUBAL LIGATION  2011       Restrictions  Restrictions/Precautions  Restrictions/Precautions: Fall Risk, General Precautions  Required Braces or Orthoses?: No  Position Activity Restriction  Other position/activity restrictions: Up with assist  Subjective   General  Response To Previous Treatment: Patient with no complaints from previous session. Family / Caregiver Present: Yes (significant other and two children)  Subjective  Subjective: Pt supine in bed and agreeable to therapy this afternoon. Pt states she has been up and walking around her room today some. She states she does still have a headache at this time but knows she isn't due for medication yet.   Pain Screening  Patient Currently in Pain: Yes  Pain Assessment  Pain Assessment: 0-10  Pain Level: 4  Pain Type: Acute pain  Pain Location: Head  Pain Descriptors: Headache  Pain Intervention(s): Repositioned; Ambulation/Increased activity; Distraction  Response to Pain Intervention: Patient Satisfied  Vital Signs  Patient Currently in Pain: Yes       Objective   Bed mobility  Supine to Sit: Independent  Sit to Supine: Independent  Scooting: Independent  Transfers  Sit to Stand: Supervision  Stand to sit: Supervision  Ambulation  Ambulation?: Yes  Ambulation 1  Surface: level tile  Device: No Device  Assistance: Stand by assistance  Quality of Gait: steady, cautious- decreased jolene, no LOB  Distance: 300ft  Comments: No complaints of dizziness with mobility  Stairs/Curb  Stairs?: No     Balance  Posture: Good  Sitting - Static: Good  Sitting - Dynamic: Good  Standing - Static: Good;-  Standing - Dynamic: Good;-  Comments: standing balance assessed without AD           Standing exercise program: Heel/toe raises, hip flexion, hip abduction, mini squats, hip extension, and hamstring curls. Reps: 15   Provided pt with written HEP of standing exercises. Assessment   Body structures, Functions, Activity limitations: Decreased functional mobility ; Decreased endurance;Decreased strength;Decreased balance  Assessment: Pt with improved ambulation distance today compared to yesterday. Pt without complaints of dizziness or lightheadedness with mobility. Pt with noted fatigue. Pt to attempt stair negotiation tomorrow. Pt with supportive significant other who is able to help at home if needed.   Prognosis: Good  Patient Education: Educated on importance of mobiltiy while pacing herself  Barriers to Learning: None  REQUIRES PT FOLLOW UP: Yes  Activity Tolerance  Activity Tolerance: Patient Tolerated treatment well  PT Equipment Recommendations  Equipment Needed: No         Goals  Short term goals  Time Frame for Short term goals: 6 visits  Short term goal 1: Perform

## 2018-01-15 NOTE — PROGRESS NOTES
Neuro Critical Care Daily Progress Note    Patient Name: Sushil Cassidy  Patient : 1975  Room/Bed: 9024320-68  Allergies: Allergies   Allergen Reactions    Chantix [Varenicline]      vomiting    Prozac [Fluoxetine Hcl]      cutting       Admission History:  Patient is a 44year old  lady who was admitted after headaches and was found to have [address] R MCA bifurcation aneurysm rupture. Hospital Course:  Patient underwent primary coil-embolization of the R MCA aneurysm. Reported headaches overnight. Current Medications:  Scheduled Meds:   gabapentin  100 mg Oral TID    niMODipine  60 mg Oral 6 times per day    polyethylene glycol  17 g Oral Daily    famotidine (PEPCID) injection  20 mg Intravenous BID    nicotine  1 patch Transdermal Daily    docusate sodium  100 mg Oral Daily    dexamethasone  2 mg Intravenous Q6H    sodium chloride flush  10 mL Intravenous 2 times per day     Continuous Infusions:   sodium chloride 75 mL/hr at 18 1129       Vitals:  Patient Vitals for the past 8 hrs:   BP Pulse Resp SpO2   01/15/18 0705 (!) 99/54 52 13 100 %   01/15/18 0600 109/76 70 17 94 %     Height and Weight  Height: 5' 2\" (157.5 cm)  Weight: 136 lb (61.7 kg)  Weight Method: Stated  BSA (Calculated - sq m): 1.64 sq meters  BMI (Calculated): 24.9  Body mass index is 24.87 kg/m². <16 Severe malnutrition  1616.99 Moderate malnutrition  1718.49 Mild malnutrition  18.524.9 Normal  2529.9 Overweight (not obese)  3034.9 Obese class 1 (Low Risk)  3539.9 Obese class 2 (Moderate Risk)  ? 40 Obese class 3 (High Risk)    NEURO: Alert and oriented, speech is clear, no unilateral weakness or numbness. CVS: S1 S2.   PULMONARY: Clear to auscultation. GI: Soft, non-tender. EXT: No edema. Assessment/Plan:  44year old with history of smoking presenting with severe headache and found to have [address] with a R MCA bifurcation cerebral aneurysm.     - H&H 2 and modified crum scale 3.

## 2018-01-16 LAB
ABSOLUTE EOS #: <0.03 K/UL (ref 0–0.44)
ABSOLUTE IMMATURE GRANULOCYTE: 0.08 K/UL (ref 0–0.3)
ABSOLUTE LYMPH #: 1.59 K/UL (ref 1.1–3.7)
ABSOLUTE MONO #: 0.67 K/UL (ref 0.1–1.2)
ALLEN TEST: ABNORMAL
ANION GAP SERPL CALCULATED.3IONS-SCNC: 12 MMOL/L (ref 9–17)
BASOPHILS # BLD: 0 % (ref 0–2)
BASOPHILS ABSOLUTE: <0.03 K/UL (ref 0–0.2)
BUN BLDV-MCNC: 6 MG/DL (ref 6–20)
BUN/CREAT BLD: ABNORMAL (ref 9–20)
CALCIUM IONIZED: 1.14 MMOL/L (ref 1.13–1.33)
CALCIUM SERPL-MCNC: 8.3 MG/DL (ref 8.6–10.4)
CHLORIDE BLD-SCNC: 111 MMOL/L (ref 98–107)
CO2: 18 MMOL/L (ref 20–31)
CREAT SERPL-MCNC: 0.65 MG/DL (ref 0.5–0.9)
DIFFERENTIAL TYPE: ABNORMAL
EOSINOPHILS RELATIVE PERCENT: 0 % (ref 1–4)
FIO2: ABNORMAL
GFR AFRICAN AMERICAN: >60 ML/MIN
GFR NON-AFRICAN AMERICAN: >60 ML/MIN
GFR SERPL CREATININE-BSD FRML MDRD: ABNORMAL ML/MIN/{1.73_M2}
GFR SERPL CREATININE-BSD FRML MDRD: ABNORMAL ML/MIN/{1.73_M2}
GLUCOSE BLD-MCNC: 129 MG/DL (ref 70–99)
HCT VFR BLD CALC: 33 % (ref 36.3–47.1)
HEMOGLOBIN: 10.4 G/DL (ref 11.9–15.1)
IMMATURE GRANULOCYTES: 1 %
LACTIC ACID, WHOLE BLOOD: 1.4 MMOL/L (ref 0.7–2.1)
LYMPHOCYTES # BLD: 14 % (ref 24–43)
MAGNESIUM: 1.9 MG/DL (ref 1.6–2.6)
MAGNESIUM: 2.2 MG/DL (ref 1.6–2.6)
MCH RBC QN AUTO: 31.9 PG (ref 25.2–33.5)
MCHC RBC AUTO-ENTMCNC: 31.5 G/DL (ref 28.4–34.8)
MCV RBC AUTO: 101.2 FL (ref 82.6–102.9)
MODE: ABNORMAL
MONOCYTES # BLD: 6 % (ref 3–12)
NEGATIVE BASE EXCESS, ART: 3 (ref 0–2)
NRBC AUTOMATED: 0 PER 100 WBC
O2 DEVICE/FLOW/%: ABNORMAL
PATIENT TEMP: ABNORMAL
PDW BLD-RTO: 12.9 % (ref 11.8–14.4)
PLATELET # BLD: 245 K/UL (ref 138–453)
PLATELET ESTIMATE: ABNORMAL
PMV BLD AUTO: 9.4 FL (ref 8.1–13.5)
POC HCO3: 21 MMOL/L (ref 21–28)
POC O2 SATURATION: 92 % (ref 94–98)
POC PCO2 TEMP: ABNORMAL MM HG
POC PCO2: 32.7 MM HG (ref 35–48)
POC PH TEMP: ABNORMAL
POC PH: 7.42 (ref 7.35–7.45)
POC PO2 TEMP: ABNORMAL MM HG
POC PO2: 62.6 MM HG (ref 83–108)
POSITIVE BASE EXCESS, ART: ABNORMAL (ref 0–3)
POTASSIUM SERPL-SCNC: 4.3 MMOL/L (ref 3.7–5.3)
RBC # BLD: 3.26 M/UL (ref 3.95–5.11)
RBC # BLD: ABNORMAL 10*6/UL
SAMPLE SITE: ABNORMAL
SEG NEUTROPHILS: 79 % (ref 36–65)
SEGMENTED NEUTROPHILS ABSOLUTE COUNT: 8.78 K/UL (ref 1.5–8.1)
SODIUM BLD-SCNC: 141 MMOL/L (ref 135–144)
TCO2 (CALC), ART: 22 MMOL/L (ref 22–29)
WBC # BLD: 11.1 K/UL (ref 3.5–11.3)
WBC # BLD: ABNORMAL 10*3/UL

## 2018-01-16 PROCEDURE — 93886 INTRACRANIAL COMPLETE STUDY: CPT

## 2018-01-16 PROCEDURE — 80048 BASIC METABOLIC PNL TOTAL CA: CPT

## 2018-01-16 PROCEDURE — 6360000002 HC RX W HCPCS: Performed by: PSYCHIATRY & NEUROLOGY

## 2018-01-16 PROCEDURE — 82803 BLOOD GASES ANY COMBINATION: CPT

## 2018-01-16 PROCEDURE — 83735 ASSAY OF MAGNESIUM: CPT

## 2018-01-16 PROCEDURE — 94762 N-INVAS EAR/PLS OXIMTRY CONT: CPT

## 2018-01-16 PROCEDURE — 99232 SBSQ HOSP IP/OBS MODERATE 35: CPT | Performed by: PSYCHIATRY & NEUROLOGY

## 2018-01-16 PROCEDURE — 97116 GAIT TRAINING THERAPY: CPT

## 2018-01-16 PROCEDURE — 97110 THERAPEUTIC EXERCISES: CPT

## 2018-01-16 PROCEDURE — 6360000002 HC RX W HCPCS: Performed by: NURSE PRACTITIONER

## 2018-01-16 PROCEDURE — 85025 COMPLETE CBC W/AUTO DIFF WBC: CPT

## 2018-01-16 PROCEDURE — 93886 INTRACRANIAL COMPLETE STUDY: CPT | Performed by: PSYCHIATRY & NEUROLOGY

## 2018-01-16 PROCEDURE — 6370000000 HC RX 637 (ALT 250 FOR IP): Performed by: EMERGENCY MEDICINE

## 2018-01-16 PROCEDURE — 36415 COLL VENOUS BLD VENIPUNCTURE: CPT

## 2018-01-16 PROCEDURE — 6370000000 HC RX 637 (ALT 250 FOR IP): Performed by: PSYCHIATRY & NEUROLOGY

## 2018-01-16 PROCEDURE — 2580000003 HC RX 258: Performed by: NURSE PRACTITIONER

## 2018-01-16 PROCEDURE — 83605 ASSAY OF LACTIC ACID: CPT

## 2018-01-16 PROCEDURE — 2580000003 HC RX 258: Performed by: EMERGENCY MEDICINE

## 2018-01-16 PROCEDURE — 6360000002 HC RX W HCPCS: Performed by: EMERGENCY MEDICINE

## 2018-01-16 PROCEDURE — S0028 INJECTION, FAMOTIDINE, 20 MG: HCPCS | Performed by: NURSE PRACTITIONER

## 2018-01-16 PROCEDURE — 2000000003 HC NEURO ICU R&B

## 2018-01-16 PROCEDURE — 82330 ASSAY OF CALCIUM: CPT

## 2018-01-16 PROCEDURE — 2500000003 HC RX 250 WO HCPCS: Performed by: NURSE PRACTITIONER

## 2018-01-16 PROCEDURE — 99233 SBSQ HOSP IP/OBS HIGH 50: CPT | Performed by: PSYCHIATRY & NEUROLOGY

## 2018-01-16 PROCEDURE — 2580000003 HC RX 258: Performed by: PSYCHIATRY & NEUROLOGY

## 2018-01-16 PROCEDURE — 6370000000 HC RX 637 (ALT 250 FOR IP): Performed by: NURSE PRACTITIONER

## 2018-01-16 RX ORDER — LACTULOSE 10 G/15ML
30 SOLUTION ORAL 2 TIMES DAILY
Status: DISCONTINUED | OUTPATIENT
Start: 2018-01-16 | End: 2018-01-22

## 2018-01-16 RX ORDER — MAGNESIUM SULFATE 1 G/100ML
1 INJECTION INTRAVENOUS
Status: COMPLETED | OUTPATIENT
Start: 2018-01-16 | End: 2018-01-16

## 2018-01-16 RX ORDER — 0.9 % SODIUM CHLORIDE 0.9 %
1000 INTRAVENOUS SOLUTION INTRAVENOUS ONCE
Status: COMPLETED | OUTPATIENT
Start: 2018-01-16 | End: 2018-01-16

## 2018-01-16 RX ORDER — SODIUM CHLORIDE 9 MG/ML
INJECTION, SOLUTION INTRAVENOUS CONTINUOUS
Status: DISCONTINUED | OUTPATIENT
Start: 2018-01-16 | End: 2018-01-24

## 2018-01-16 RX ORDER — HEPARIN SODIUM 5000 [USP'U]/ML
5000 INJECTION, SOLUTION INTRAVENOUS; SUBCUTANEOUS EVERY 8 HOURS SCHEDULED
Status: DISCONTINUED | OUTPATIENT
Start: 2018-01-16 | End: 2018-01-26 | Stop reason: HOSPADM

## 2018-01-16 RX ORDER — FAMOTIDINE 20 MG/1
20 TABLET, FILM COATED ORAL 2 TIMES DAILY
Status: DISCONTINUED | OUTPATIENT
Start: 2018-01-16 | End: 2018-01-26 | Stop reason: HOSPADM

## 2018-01-16 RX ORDER — MAGNESIUM SULFATE 1 G/100ML
1 INJECTION INTRAVENOUS PRN
Status: DISCONTINUED | OUTPATIENT
Start: 2018-01-16 | End: 2018-01-26 | Stop reason: HOSPADM

## 2018-01-16 RX ADMIN — GABAPENTIN 100 MG: 100 CAPSULE ORAL at 20:13

## 2018-01-16 RX ADMIN — OXYCODONE HYDROCHLORIDE 10 MG: 5 TABLET ORAL at 05:54

## 2018-01-16 RX ADMIN — MAGNESIUM SULFATE HEPTAHYDRATE 1 G: 1 INJECTION, SOLUTION INTRAVENOUS at 11:59

## 2018-01-16 RX ADMIN — NIMODIPINE 60 MG: 30 CAPSULE, LIQUID FILLED ORAL at 11:59

## 2018-01-16 RX ADMIN — OXYCODONE HYDROCHLORIDE 10 MG: 5 TABLET ORAL at 23:08

## 2018-01-16 RX ADMIN — CYCLOBENZAPRINE HYDROCHLORIDE 10 MG: 5 TABLET, FILM COATED ORAL at 08:41

## 2018-01-16 RX ADMIN — NIMODIPINE 60 MG: 30 CAPSULE, LIQUID FILLED ORAL at 17:08

## 2018-01-16 RX ADMIN — MAGNESIUM SULFATE HEPTAHYDRATE 1 G: 1 INJECTION, SOLUTION INTRAVENOUS at 14:21

## 2018-01-16 RX ADMIN — OXYCODONE HYDROCHLORIDE 10 MG: 5 TABLET ORAL at 09:57

## 2018-01-16 RX ADMIN — DEXAMETHASONE SODIUM PHOSPHATE 2 MG: 4 INJECTION, SOLUTION INTRAMUSCULAR; INTRAVENOUS at 17:08

## 2018-01-16 RX ADMIN — NIMODIPINE 60 MG: 30 CAPSULE, LIQUID FILLED ORAL at 04:26

## 2018-01-16 RX ADMIN — OXYCODONE HYDROCHLORIDE 10 MG: 5 TABLET ORAL at 01:48

## 2018-01-16 RX ADMIN — OXYCODONE HYDROCHLORIDE 10 MG: 5 TABLET ORAL at 19:05

## 2018-01-16 RX ADMIN — MAGNESIUM SULFATE HEPTAHYDRATE 1 G: 1 INJECTION, SOLUTION INTRAVENOUS at 23:15

## 2018-01-16 RX ADMIN — HEPARIN SODIUM 5000 UNITS: 5000 INJECTION, SOLUTION INTRAVENOUS; SUBCUTANEOUS at 23:08

## 2018-01-16 RX ADMIN — GABAPENTIN 100 MG: 100 CAPSULE ORAL at 14:32

## 2018-01-16 RX ADMIN — DEXAMETHASONE SODIUM PHOSPHATE 2 MG: 4 INJECTION, SOLUTION INTRAMUSCULAR; INTRAVENOUS at 23:09

## 2018-01-16 RX ADMIN — Medication 10 ML: at 09:58

## 2018-01-16 RX ADMIN — HEPARIN SODIUM 5000 UNITS: 5000 INJECTION, SOLUTION INTRAVENOUS; SUBCUTANEOUS at 14:17

## 2018-01-16 RX ADMIN — LACTULOSE 30 G: 10 SOLUTION ORAL at 14:35

## 2018-01-16 RX ADMIN — OXYCODONE HYDROCHLORIDE 10 MG: 5 TABLET ORAL at 14:17

## 2018-01-16 RX ADMIN — POLYETHYLENE GLYCOL 3350 17 G: 17 POWDER, FOR SOLUTION ORAL at 09:58

## 2018-01-16 RX ADMIN — NIMODIPINE 60 MG: 30 CAPSULE, LIQUID FILLED ORAL at 09:57

## 2018-01-16 RX ADMIN — FENTANYL CITRATE 25 MCG: 50 INJECTION, SOLUTION INTRAMUSCULAR; INTRAVENOUS at 04:34

## 2018-01-16 RX ADMIN — DEXAMETHASONE SODIUM PHOSPHATE 2 MG: 4 INJECTION, SOLUTION INTRAMUSCULAR; INTRAVENOUS at 04:26

## 2018-01-16 RX ADMIN — ONDANSETRON 4 MG: 2 INJECTION INTRAMUSCULAR; INTRAVENOUS at 05:54

## 2018-01-16 RX ADMIN — DOCUSATE SODIUM 100 MG: 100 CAPSULE, LIQUID FILLED ORAL at 09:57

## 2018-01-16 RX ADMIN — FAMOTIDINE 20 MG: 20 TABLET, FILM COATED ORAL at 23:09

## 2018-01-16 RX ADMIN — MAGNESIUM SULFATE HEPTAHYDRATE 1 G: 1 INJECTION, SOLUTION INTRAVENOUS at 21:48

## 2018-01-16 RX ADMIN — FAMOTIDINE 20 MG: 10 INJECTION, SOLUTION INTRAVENOUS at 09:57

## 2018-01-16 RX ADMIN — SODIUM CHLORIDE: 9 INJECTION, SOLUTION INTRAVENOUS at 08:41

## 2018-01-16 RX ADMIN — CYCLOBENZAPRINE HYDROCHLORIDE 10 MG: 5 TABLET, FILM COATED ORAL at 23:15

## 2018-01-16 RX ADMIN — DEXAMETHASONE SODIUM PHOSPHATE 2 MG: 4 INJECTION, SOLUTION INTRAMUSCULAR; INTRAVENOUS at 11:59

## 2018-01-16 RX ADMIN — LACTULOSE 30 G: 10 SOLUTION ORAL at 20:12

## 2018-01-16 RX ADMIN — GABAPENTIN 100 MG: 100 CAPSULE ORAL at 09:57

## 2018-01-16 RX ADMIN — CYCLOBENZAPRINE HYDROCHLORIDE 10 MG: 5 TABLET, FILM COATED ORAL at 17:07

## 2018-01-16 RX ADMIN — SODIUM CHLORIDE: 9 INJECTION, SOLUTION INTRAVENOUS at 20:18

## 2018-01-16 RX ADMIN — NIMODIPINE 60 MG: 30 CAPSULE, LIQUID FILLED ORAL at 20:10

## 2018-01-16 RX ADMIN — SODIUM CHLORIDE 1000 ML: 9 INJECTION, SOLUTION INTRAVENOUS at 19:08

## 2018-01-16 RX ADMIN — FENTANYL CITRATE 25 MCG: 50 INJECTION, SOLUTION INTRAMUSCULAR; INTRAVENOUS at 12:08

## 2018-01-16 ASSESSMENT — PAIN SCALES - GENERAL
PAINLEVEL_OUTOF10: 10
PAINLEVEL_OUTOF10: 7
PAINLEVEL_OUTOF10: 4
PAINLEVEL_OUTOF10: 4
PAINLEVEL_OUTOF10: 5
PAINLEVEL_OUTOF10: 8
PAINLEVEL_OUTOF10: 7
PAINLEVEL_OUTOF10: 10
PAINLEVEL_OUTOF10: 7

## 2018-01-16 ASSESSMENT — PAIN DESCRIPTION - LOCATION: LOCATION: HEAD

## 2018-01-16 ASSESSMENT — PAIN DESCRIPTION - ONSET: ONSET: ON-GOING

## 2018-01-16 ASSESSMENT — PAIN DESCRIPTION - PAIN TYPE: TYPE: ACUTE PAIN

## 2018-01-16 ASSESSMENT — PAIN DESCRIPTION - FREQUENCY: FREQUENCY: CONTINUOUS

## 2018-01-16 ASSESSMENT — PAIN DESCRIPTION - ORIENTATION: ORIENTATION: POSTERIOR

## 2018-01-16 ASSESSMENT — PAIN DESCRIPTION - DESCRIPTORS: DESCRIPTORS: HEADACHE

## 2018-01-16 ASSESSMENT — PAIN DESCRIPTION - PROGRESSION: CLINICAL_PROGRESSION: GRADUALLY IMPROVING

## 2018-01-16 NOTE — PROGRESS NOTES
Daily Progress Note  Neuro Critical Care      Patient Name: Scott Chavez  Patient : 1975  Room/Bed: [facility]/3718-72  Allergies: Allergies   Allergen Reactions    Chantix [Varenicline]      vomiting    Prozac [Fluoxetine Hcl]      cutting     Problem List:   Patient Active Problem List   Diagnosis    Tobacco use    Genital warts    Migraine    SAH (subarachnoid hemorrhage) (HCC)    Aneurysmal subarachnoid hemorrhage (HCC)    Generalized headaches    Intracranial aneurysm    Ruptured middle cerebral artery aneurysm (HCC)     INTERVAL HISTORY:  The patient is a 44 y.o. Female who presented to [address] with a thunderclap headache that started around 10:45 AM. She was found to have a [address] on CT head and transferred to [facility]. V's. No significant PMH. Family history of brain aneurysm in her father. Daily smoker. 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  CTH stable right sided predominant subarachnoid hemorrhage in basal cisterns. Started Decadron 2 mg q6h x 4 days for headache  1/15 Overnight, had a couple episodes of bradycardia, heart rate in the 30s, asymptomatic. Then had another event with HR in the 20s requiring 0.5 mg atropine. Started Neurontin 100 mg TID for continued headache    Currently, she is alert and oriented, neuro intact. She reports today that her headache and photophobia is improved. She was started on Neurontin 100 mg TID yesterday for the continued head pain. Overnight, she had a couple episodes of bradycardia in the 30's that were asymptomatic. EKG and labs reviewed.     CURRENT MEDICATIONS:  SCHEDULED MEDICATIONS:   heparin (porcine)  5,000 Units Subcutaneous 3 times per day    magnesium sulfate  1 g Intravenous Q1H    gabapentin  100 mg Oral TID    niMODipine  60 mg Oral 6 times per day    polyethylene glycol  17 g Oral Daily    famotidine (PEPCID) injection  20 mg Intravenous Please contact neuro critical care with any changes in exam or patient status. Lou Hubbard CNP  Neuro Critical Care  Phone [phone]  1/16/2018     12:21 PM     Stroke and Neuroscience Intensive Care Attending:    I obtained brief history, examined the patient,reviewed the nurse practitioner's note and agree with the documented findings and plan of care. Any areas of disagreement are noted on the chart. I agree with the chief complaint, past medical history, past surgical history, allergies, medications, social and family history as documented unless otherwise noted above. Patient with ruptured right MCA bifurcation aneurysm. H&H 2 and modified crum 3. On exam,  Patient is alert and oriented, speech is clear and fluent. No weakness in UEs or LEs. CV: S1 S2.  Lungs are clear to ausculation. Abd: Soft, non-tender. Ext: No edema. Headaches improving. Monitor urine out-put. Avoid hypovolemia. Nimodipine therapy. Permissive hypertension. Follow TCDs. Will continue to follow. Johnathon Wolfe [address], Vascular neurology.   [address] [facility] Stroke [place]

## 2018-01-16 NOTE — PROGRESS NOTES
Physical Therapy  Facility/Department: [address]  Daily Treatment Note  NAME: Shahram Quiroz  : 1975  MRN: [id number]    Date of Service: 2018    Patient Diagnosis(es):   Patient Active Problem List    Diagnosis Date Noted    Generalized headaches     Intracranial aneurysm     Ruptured middle cerebral artery aneurysm (HCC)     Aneurysmal subarachnoid hemorrhage ([address]     [facility] 2018    Tobacco use 2017    Genital warts 2017    Migraine        Past Medical History:   Diagnosis Date    Caffeinism     [employer]    Generalized headaches     Low back pain     Migraine     Migraines stopped after birth of first daughter in [address] Patient in clinical research study 2018    TARGET: Consented 2018, Expected Completion Date 2018    Recurrent sinus infections 2017    Ruptured middle cerebral artery aneurysm ([facility] 2018    Right MCA    SAH (subarachnoid hemorrhage) ([facility] 2018    Tobacco use      Past Surgical History:   Procedure Laterality Date    BRAIN ANEURYSM SURGERY  2018    coil ablation ; MCA     OTHER SURGICAL HISTORY  2013    Pelvic Ablasion    TUBAL LIGATION  2011       Restrictions  Restrictions/Precautions  Restrictions/Precautions: Fall Risk, General Precautions  Required Braces or Orthoses?: No  Position Activity Restriction  Other position/activity restrictions: Up with assist  Subjective   General  Response To Previous Treatment: Patient with no complaints from previous session. Family / Caregiver Present: Yes  Subjective  Subjective: Pt supine in bed and agreeable to therapy this afternoon. Pt states her headache is getting better.   Pain Screening  Patient Currently in Pain: Yes  Pain Assessment  Pain Assessment: 0-10  Pain Level: 4  Pain Type: Acute pain  Pain Location: Head  Pain Orientation: Posterior  Pain Descriptors: Headache  Pain Frequency:

## 2018-01-17 LAB
ABSOLUTE EOS #: <0.03 K/UL (ref 0–0.44)
ABSOLUTE IMMATURE GRANULOCYTE: 0.06 K/UL (ref 0–0.3)
ABSOLUTE LYMPH #: 2.67 K/UL (ref 1.1–3.7)
ABSOLUTE MONO #: 0.98 K/UL (ref 0.1–1.2)
ANION GAP SERPL CALCULATED.3IONS-SCNC: 12 MMOL/L (ref 9–17)
BASOPHILS # BLD: 0 % (ref 0–2)
BASOPHILS ABSOLUTE: <0.03 K/UL (ref 0–0.2)
BUN BLDV-MCNC: 4 MG/DL (ref 6–20)
BUN/CREAT BLD: ABNORMAL (ref 9–20)
CALCIUM IONIZED: 1.1 MMOL/L (ref 1.13–1.33)
CALCIUM SERPL-MCNC: 7.7 MG/DL (ref 8.6–10.4)
CHLORIDE BLD-SCNC: 108 MMOL/L (ref 98–107)
CO2: 20 MMOL/L (ref 20–31)
CREAT SERPL-MCNC: 0.54 MG/DL (ref 0.5–0.9)
DIFFERENTIAL TYPE: ABNORMAL
EOSINOPHILS RELATIVE PERCENT: 0 % (ref 1–4)
GFR AFRICAN AMERICAN: >60 ML/MIN
GFR NON-AFRICAN AMERICAN: >60 ML/MIN
GFR SERPL CREATININE-BSD FRML MDRD: ABNORMAL ML/MIN/{1.73_M2}
GFR SERPL CREATININE-BSD FRML MDRD: ABNORMAL ML/MIN/{1.73_M2}
GLUCOSE BLD-MCNC: 101 MG/DL (ref 70–99)
HCT VFR BLD CALC: 30.1 % (ref 36.3–47.1)
HEMOGLOBIN: 10.1 G/DL (ref 11.9–15.1)
IMMATURE GRANULOCYTES: 1 %
LYMPHOCYTES # BLD: 25 % (ref 24–43)
MAGNESIUM: 2.3 MG/DL (ref 1.6–2.6)
MAGNESIUM: 2.4 MG/DL (ref 1.6–2.6)
MCH RBC QN AUTO: 32.2 PG (ref 25.2–33.5)
MCHC RBC AUTO-ENTMCNC: 33.6 G/DL (ref 28.4–34.8)
MCV RBC AUTO: 95.9 FL (ref 82.6–102.9)
MONOCYTES # BLD: 9 % (ref 3–12)
NRBC AUTOMATED: 0 PER 100 WBC
PDW BLD-RTO: 12.8 % (ref 11.8–14.4)
PHOSPHORUS: 2.6 MG/DL (ref 2.6–4.5)
PLATELET # BLD: 220 K/UL (ref 138–453)
PLATELET ESTIMATE: ABNORMAL
PMV BLD AUTO: 9.1 FL (ref 8.1–13.5)
POTASSIUM SERPL-SCNC: 3.3 MMOL/L (ref 3.7–5.3)
RBC # BLD: 3.14 M/UL (ref 3.95–5.11)
RBC # BLD: ABNORMAL 10*6/UL
SEG NEUTROPHILS: 65 % (ref 36–65)
SEGMENTED NEUTROPHILS ABSOLUTE COUNT: 7.06 K/UL (ref 1.5–8.1)
SODIUM BLD-SCNC: 140 MMOL/L (ref 135–144)
THYROXINE, FREE: 0.83 NG/DL (ref 0.93–1.7)
TSH SERPL DL<=0.05 MIU/L-ACNC: 5.07 MIU/L (ref 0.3–5)
WBC # BLD: 10.8 K/UL (ref 3.5–11.3)
WBC # BLD: ABNORMAL 10*3/UL

## 2018-01-17 PROCEDURE — 99233 SBSQ HOSP IP/OBS HIGH 50: CPT | Performed by: PSYCHIATRY & NEUROLOGY

## 2018-01-17 PROCEDURE — 6370000000 HC RX 637 (ALT 250 FOR IP): Performed by: PSYCHIATRY & NEUROLOGY

## 2018-01-17 PROCEDURE — 6360000002 HC RX W HCPCS: Performed by: PSYCHIATRY & NEUROLOGY

## 2018-01-17 PROCEDURE — 6370000000 HC RX 637 (ALT 250 FOR IP): Performed by: NURSE PRACTITIONER

## 2018-01-17 PROCEDURE — 36415 COLL VENOUS BLD VENIPUNCTURE: CPT

## 2018-01-17 PROCEDURE — 80048 BASIC METABOLIC PNL TOTAL CA: CPT

## 2018-01-17 PROCEDURE — 82330 ASSAY OF CALCIUM: CPT

## 2018-01-17 PROCEDURE — 6360000002 HC RX W HCPCS: Performed by: EMERGENCY MEDICINE

## 2018-01-17 PROCEDURE — 99291 CRITICAL CARE FIRST HOUR: CPT | Performed by: NEUROLOGICAL SURGERY

## 2018-01-17 PROCEDURE — 85025 COMPLETE CBC W/AUTO DIFF WBC: CPT

## 2018-01-17 PROCEDURE — 93886 INTRACRANIAL COMPLETE STUDY: CPT

## 2018-01-17 PROCEDURE — 94762 N-INVAS EAR/PLS OXIMTRY CONT: CPT

## 2018-01-17 PROCEDURE — 83735 ASSAY OF MAGNESIUM: CPT

## 2018-01-17 PROCEDURE — 84443 ASSAY THYROID STIM HORMONE: CPT

## 2018-01-17 PROCEDURE — 6370000000 HC RX 637 (ALT 250 FOR IP): Performed by: EMERGENCY MEDICINE

## 2018-01-17 PROCEDURE — 6360000002 HC RX W HCPCS: Performed by: NURSE PRACTITIONER

## 2018-01-17 PROCEDURE — 84439 ASSAY OF FREE THYROXINE: CPT

## 2018-01-17 PROCEDURE — 6370000000 HC RX 637 (ALT 250 FOR IP): Performed by: NEUROLOGICAL SURGERY

## 2018-01-17 PROCEDURE — 2580000003 HC RX 258: Performed by: NURSE PRACTITIONER

## 2018-01-17 PROCEDURE — 84100 ASSAY OF PHOSPHORUS: CPT

## 2018-01-17 PROCEDURE — 2500000003 HC RX 250 WO HCPCS

## 2018-01-17 PROCEDURE — 2000000003 HC NEURO ICU R&B

## 2018-01-17 RX ORDER — MORPHINE SULFATE 4 MG/ML
4 INJECTION, SOLUTION INTRAMUSCULAR; INTRAVENOUS ONCE
Status: COMPLETED | OUTPATIENT
Start: 2018-01-17 | End: 2018-01-17

## 2018-01-17 RX ORDER — POTASSIUM CHLORIDE 7.45 MG/ML
10 INJECTION INTRAVENOUS
Status: DISCONTINUED | OUTPATIENT
Start: 2018-01-17 | End: 2018-01-17

## 2018-01-17 RX ORDER — MAGNESIUM SULFATE 4 G/50ML
4 INJECTION INTRAVENOUS ONCE
Status: COMPLETED | OUTPATIENT
Start: 2018-01-17 | End: 2018-01-17

## 2018-01-17 RX ORDER — LIDOCAINE HYDROCHLORIDE 10 MG/ML
INJECTION, SOLUTION INFILTRATION; PERINEURAL
Status: COMPLETED
Start: 2018-01-17 | End: 2018-01-17

## 2018-01-17 RX ORDER — 0.9 % SODIUM CHLORIDE 0.9 %
500 INTRAVENOUS SOLUTION INTRAVENOUS ONCE
Status: COMPLETED | OUTPATIENT
Start: 2018-01-17 | End: 2018-01-17

## 2018-01-17 RX ORDER — FENTANYL CITRATE 50 UG/ML
50 INJECTION, SOLUTION INTRAMUSCULAR; INTRAVENOUS ONCE
Status: COMPLETED | OUTPATIENT
Start: 2018-01-17 | End: 2018-01-17

## 2018-01-17 RX ORDER — POTASSIUM CHLORIDE 7.45 MG/ML
10 INJECTION INTRAVENOUS
Status: DISCONTINUED | OUTPATIENT
Start: 2018-01-17 | End: 2018-01-17 | Stop reason: CLARIF

## 2018-01-17 RX ORDER — LIDOCAINE HYDROCHLORIDE 10 MG/ML
5 INJECTION, SOLUTION INFILTRATION; PERINEURAL ONCE
Status: COMPLETED | OUTPATIENT
Start: 2018-01-17 | End: 2018-01-17

## 2018-01-17 RX ORDER — SIMVASTATIN 40 MG
40 TABLET ORAL NIGHTLY
Status: DISCONTINUED | OUTPATIENT
Start: 2018-01-17 | End: 2018-01-26 | Stop reason: HOSPADM

## 2018-01-17 RX ADMIN — SIMVASTATIN 40 MG: 40 TABLET, FILM COATED ORAL at 23:21

## 2018-01-17 RX ADMIN — NIMODIPINE 60 MG: 30 CAPSULE, LIQUID FILLED ORAL at 12:03

## 2018-01-17 RX ADMIN — POTASSIUM CHLORIDE 20 MEQ: 149 INJECTION, SOLUTION, CONCENTRATE INTRAVENOUS at 15:41

## 2018-01-17 RX ADMIN — FAMOTIDINE 20 MG: 20 TABLET, FILM COATED ORAL at 21:17

## 2018-01-17 RX ADMIN — NIMODIPINE 60 MG: 30 CAPSULE, LIQUID FILLED ORAL at 04:56

## 2018-01-17 RX ADMIN — HEPARIN SODIUM 5000 UNITS: 5000 INJECTION, SOLUTION INTRAVENOUS; SUBCUTANEOUS at 13:13

## 2018-01-17 RX ADMIN — CYCLOBENZAPRINE HYDROCHLORIDE 10 MG: 5 TABLET, FILM COATED ORAL at 18:35

## 2018-01-17 RX ADMIN — OXYCODONE HYDROCHLORIDE 10 MG: 5 TABLET ORAL at 20:16

## 2018-01-17 RX ADMIN — DOCUSATE SODIUM 100 MG: 100 CAPSULE, LIQUID FILLED ORAL at 07:59

## 2018-01-17 RX ADMIN — NIMODIPINE 60 MG: 30 CAPSULE, LIQUID FILLED ORAL at 00:00

## 2018-01-17 RX ADMIN — GABAPENTIN 100 MG: 100 CAPSULE ORAL at 13:14

## 2018-01-17 RX ADMIN — SODIUM CHLORIDE 500 ML: 9 INJECTION, SOLUTION INTRAVENOUS at 11:33

## 2018-01-17 RX ADMIN — CALCIUM GLUCONATE 2 G: 94 INJECTION, SOLUTION INTRAVENOUS at 13:47

## 2018-01-17 RX ADMIN — MAGNESIUM SULFATE HEPTAHYDRATE 1 G: 1 INJECTION, SOLUTION INTRAVENOUS at 09:20

## 2018-01-17 RX ADMIN — ONDANSETRON 4 MG: 2 INJECTION INTRAMUSCULAR; INTRAVENOUS at 23:20

## 2018-01-17 RX ADMIN — GABAPENTIN 100 MG: 100 CAPSULE ORAL at 07:59

## 2018-01-17 RX ADMIN — CYCLOBENZAPRINE HYDROCHLORIDE 10 MG: 5 TABLET, FILM COATED ORAL at 09:33

## 2018-01-17 RX ADMIN — NIMODIPINE 60 MG: 30 CAPSULE, LIQUID FILLED ORAL at 20:16

## 2018-01-17 RX ADMIN — OXYCODONE HYDROCHLORIDE 10 MG: 5 TABLET ORAL at 15:40

## 2018-01-17 RX ADMIN — MAGNESIUM SULFATE HEPTAHYDRATE 1 G: 1 INJECTION, SOLUTION INTRAVENOUS at 07:59

## 2018-01-17 RX ADMIN — FENTANYL CITRATE 50 MCG: 50 INJECTION, SOLUTION INTRAMUSCULAR; INTRAVENOUS at 15:41

## 2018-01-17 RX ADMIN — FAMOTIDINE 20 MG: 20 TABLET, FILM COATED ORAL at 07:59

## 2018-01-17 RX ADMIN — POTASSIUM CHLORIDE 40 MEQ: 149 INJECTION, SOLUTION, CONCENTRATE INTRAVENOUS at 10:26

## 2018-01-17 RX ADMIN — MAGNESIUM SULFATE IN WATER 4 G: 80 INJECTION, SOLUTION INTRAVENOUS at 17:01

## 2018-01-17 RX ADMIN — OXYCODONE HYDROCHLORIDE 10 MG: 5 TABLET ORAL at 03:49

## 2018-01-17 RX ADMIN — LACTULOSE 30 G: 10 SOLUTION ORAL at 21:17

## 2018-01-17 RX ADMIN — POLYETHYLENE GLYCOL 3350 17 G: 17 POWDER, FOR SOLUTION ORAL at 07:59

## 2018-01-17 RX ADMIN — OXYCODONE HYDROCHLORIDE 10 MG: 5 TABLET ORAL at 07:59

## 2018-01-17 RX ADMIN — FENTANYL CITRATE 25 MCG: 50 INJECTION, SOLUTION INTRAMUSCULAR; INTRAVENOUS at 00:55

## 2018-01-17 RX ADMIN — HEPARIN SODIUM 5000 UNITS: 5000 INJECTION, SOLUTION INTRAVENOUS; SUBCUTANEOUS at 06:04

## 2018-01-17 RX ADMIN — LACTULOSE 30 G: 10 SOLUTION ORAL at 07:58

## 2018-01-17 RX ADMIN — FENTANYL CITRATE 25 MCG: 50 INJECTION, SOLUTION INTRAMUSCULAR; INTRAVENOUS at 18:40

## 2018-01-17 RX ADMIN — FENTANYL CITRATE 25 MCG: 50 INJECTION, SOLUTION INTRAMUSCULAR; INTRAVENOUS at 13:14

## 2018-01-17 RX ADMIN — LIDOCAINE HYDROCHLORIDE: 10 INJECTION, SOLUTION INFILTRATION; PERINEURAL at 17:53

## 2018-01-17 RX ADMIN — NIMODIPINE 60 MG: 30 CAPSULE, LIQUID FILLED ORAL at 15:40

## 2018-01-17 RX ADMIN — MORPHINE SULFATE 4 MG: 4 INJECTION INTRAVENOUS at 23:17

## 2018-01-17 RX ADMIN — OXYCODONE HYDROCHLORIDE 10 MG: 5 TABLET ORAL at 12:03

## 2018-01-17 RX ADMIN — FENTANYL CITRATE 25 MCG: 50 INJECTION, SOLUTION INTRAMUSCULAR; INTRAVENOUS at 21:15

## 2018-01-17 RX ADMIN — GABAPENTIN 100 MG: 100 CAPSULE ORAL at 21:17

## 2018-01-17 RX ADMIN — NIMODIPINE 60 MG: 30 CAPSULE, LIQUID FILLED ORAL at 07:58

## 2018-01-17 ASSESSMENT — PAIN DESCRIPTION - FREQUENCY
FREQUENCY: CONTINUOUS

## 2018-01-17 ASSESSMENT — PAIN SCALES - GENERAL
PAINLEVEL_OUTOF10: 10
PAINLEVEL_OUTOF10: 5
PAINLEVEL_OUTOF10: 7
PAINLEVEL_OUTOF10: 7
PAINLEVEL_OUTOF10: 10
PAINLEVEL_OUTOF10: 9
PAINLEVEL_OUTOF10: 10
PAINLEVEL_OUTOF10: 9

## 2018-01-17 ASSESSMENT — PAIN DESCRIPTION - PROGRESSION
CLINICAL_PROGRESSION: NOT CHANGED
CLINICAL_PROGRESSION: GRADUALLY WORSENING
CLINICAL_PROGRESSION: GRADUALLY WORSENING
CLINICAL_PROGRESSION: GRADUALLY IMPROVING
CLINICAL_PROGRESSION: NOT CHANGED

## 2018-01-17 ASSESSMENT — PAIN DESCRIPTION - PAIN TYPE
TYPE: ACUTE PAIN

## 2018-01-17 ASSESSMENT — PAIN DESCRIPTION - ONSET
ONSET: ON-GOING

## 2018-01-17 ASSESSMENT — PAIN DESCRIPTION - DESCRIPTORS
DESCRIPTORS: PRESSURE;HEADACHE
DESCRIPTORS: ACHING;PRESSURE

## 2018-01-17 ASSESSMENT — PAIN DESCRIPTION - LOCATION
LOCATION: HEAD

## 2018-01-17 ASSESSMENT — PAIN DESCRIPTION - ORIENTATION
ORIENTATION: ANTERIOR

## 2018-01-17 NOTE — PLAN OF CARE
Problem: Falls - Risk of  Goal: Absence of falls  Outcome: Ongoing  Patient remained free from falls this shift. Call light and personal belongings within reach. Bed locked and in the lowest position and bed alarm on. Hourly rounding complete. Patient encouraged to call for assistance before getting out of bed.

## 2018-01-17 NOTE — PROGRESS NOTES
internal carotid artery circulation revealed no other evidence of cerebral aneurysm, arteriovenous malformation, or arterial stenosis. Capillary and venous phase images were also unremarkable, with no evidence of veno-occlusive disease. Left VA technique: The left subclavian artery was then selectively catheterized and the left vertebral artery was selectively catheterized with roadmap guidance. Digital subtraction angiography of the intracranial left vertebrobasilar run-off was obtained in frontal and lateral projections. Interpretation: The left vertebral artery injection demonstrates normal antegrade opacification of the left vertebral artery, including the cervical segment, basilar artery, and respective branches. There was no evidence of cerebral aneurysm, arteriovenous malformation, or arterial stenosis. Capillary and venous phase images were unremarkable. There is an anastomosis between the left vertebral artery and the left occipital artery through the posterior radicular branches at the C1 level. The  right PCA is absent, confirming the presence of a right fetal PCA. Retrograde contrast opacification of the contralateral right V4 segment was achieved and demonstrated no evidence of an aneurysm at the origin of the right PICA. Following removal of all catheters, digital subtraction angiography of the right common femoral artery was performed in a frontal oblique projection via injection of contrast through the intravascular sheath. Arterial phase images were unremarkable, with  no evidence of arterial stenosis, dissection, pseudoaneurysm, or arteriovenous fistula. The femoral puncture site was placed near the femoral bifurcation, but vessel size appeared suitable for the use of a closure device. A 6F Vascade was used to achieve hemostasis at the groin.   No complications were experienced, and the patient was discharged from the neuro interventional suite following reversal of general anesthesia, and confirmation of a normal neurological exam. Impression: 1. 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 2. Successful endovascular treatment of the above-mentioned MCA aneurysm by primary coil embolization. Final post-treatment control angiography confirmed complete occlusion of the cerebral aneurysm, with no evidence of parent vessel injury or thromboembolic event. 3. No other aneurysms were seen on this cerebral angiogram.  Normal variants include an atretic right A1 segment, with right LIES territory being supplied by the left ICA, and a fetal right PCA. Dr. Mirta Ibanez dictated this invasive procedure. Dr. Shira Dumont was present for all procedural and imaging components of this case. Examination was reviewed and reported findings confirmed and edited by Dr. Rita Sexton. Dr. Rita Sexton supervised and interpreted this procedure.     Vl Transcranial Doppler Complete    Result Date: 1/16/2018    [facility]  Vascular Transcranial Procedure   Patient Name      Ajit Ramires        Date of Study             01/16/2018                    Melinda Mckenzie   Date of Birth     1975   Gender                    Female   Age               44 year(s)   Race                         Room Number       0574   Corporate ID #    [id number]   Patient Acct #    [de-identified]   MR #              [id number]      Sonographer               Sita Winters   Accession #       [id number]    Interpreting Physician    [address] [place] [address]   Referring Nurse                Referring Physician       Rossi Busch  Practitioner  Additional Comments CLASSIFICATION OF VASOSPASM MEAN MCA VELOCITY ----- MCA/ICA VELOCITY RATIO ------- INTERPRETATION <120 cm/sec --------------------- less than 3 --------------------------- Normal, nonspecific elevation or distal MCA spasm >120 cm/sec ------------------------ 3 Transforamenal Approach              Siphon: 88.2  Vertebral:41.2                       Vertebral:  BASILAR: 60.8                        28.5  Patient Status: In Patient.   Conclusions   Summary   DOS: 1/16/2018   Complete Arterial TCD Showing:   Diffuse mild to moderate vasospasm, highest velocities at the right MCA  with Max MCBFVs of 140 cm/sec with Lindegaard ratio of 3.91, improved from  the prior study on 1/15/2018   Recommendations   Clinical Correlation is Recommended as Per the Primary Team  Follow up TCD as per the Primary Team   Signature   ----------------------------------------------------------------  Electronically signed by Sita Winters(Sonographer) on  01/16/2018 12:21 PM  ----------------------------------------------------------------   ----------------------------------------------------------------  Electronically signed by Twila Roman(Interpreting physician)  on 01/16/2018 07:19 PM  ----------------------------------------------------------------      Vl Transcranial Doppler Complete    Result Date: 1/16/2018    [facility]  Vascular Transcranial Procedure   Patient Name    Thierry Zuleta         Date of Study             01/15/2018                  More Grace   Date of Birth   1975    Gender                    Female   Age             44 year(s)    Race                         Room Number     3867   Corporate ID #  [id number]   Patient Acct #  [de-identified]   MR #            [id number] Jorgensen   Accession #     [id number]     Interpreting Physician    Joe Burgos   Referring Nurse Galdino Rowley        Referring Physician  Practitioner    Chepe Benitez CNP  Additional Comments CLASSIFICATION OF VASOSPASM MEAN MCA VELOCITY ----- MCA/ICA VELOCITY RATIO ------- INTERPRETATION <120 cm/sec --------------------- less than 3 --------------------------- Normal, nonspecific elevation or distal MCA spasm >120 cm/sec ------------------------ 3 to 6 imaging at this time  - Nimotop 60mg Q4h to prevent vasospasm  - Goal Magnesium levels 2.5-4 to prevent vasospasm; given 2g Mag Sulfate this Am, recheck 2.4  - Give magnesium sulfate 4g IVPB continuous infusion then recheck mag level  - Continue decadron 2mg IVP Q6h, Neurontin 100mg TID for meningismus  - Pain control; fentanyl 25mcg Q1hPRN, roxicodone 5-10mg Q4hPRN, flexeril 10mg TIDPRN  - Off AED after aneurysm secured  - Neuro checks per protocol  - F/U Repeat CTH in AM    CARDIOVASCULAR:  - SBP goal <220, MAP >75  - Multiple episodes of bradycardia, atropine x1  - HR >45 overnight, asymptomatic   - Negative trops, EKG sinus angeles  - Cardiology consulted; appreciate recommendations, no further cardiac testing  - Continue telemetry    PULMONARY:  - Maintaining O2 sats on room air  - Continue to monitor     RENAL/FLUID/ELECTROLYTE:  - BUN 4/ Creatinine . 54  - Adequate urine ouput 2.6ml/kg/hr  - Continue IVF NS @75ml/hr  - 500 ml NS bolus to achieve euvolemia per I&O documented  - Hypokalemia, K 3.3, given KCL 60meq x 1  - Hypocalcemia, Ionized ca 1.10, given 2 g calcium gluconate   - Goal Mag 2.5-4 to help prevent vasospasm, given 2g Mag Sulfate this Am, recheck 2.4  - Give magnesium sulfate 4g IVPB continuous infusion then recheck mag level  - Replace electrolytes PRN  - Daily BMP    GI/NUTRITION:  NUTRITION:  DIET GENERAL;  - Pepcid 20mg BID for GI ppx while on steroids  - Colace 100mg Qd, lactulose 30g BID, Glycolax QD for consipation  - Mag Citrate PRN if no BM by end of day    ID/HEME:  - Afebrile, tmax 37  - No leukocytosis, WBC 10.8  - H&H stable, 10.1/30.1  - Platelets 253  - Daily CBC     ENDOCRINE:  - Continue to monitor blood glucose    OTHER:  - PT/OT    PROPHYLAXIS:  Stress ulcer: H2 blocker, pepcid 20mg BID while on steroids    DVT PROPHYLAXIS:  - SCD sleeves - Thigh High   - AIDAN stockings - Thigh High  - Heparin 5000units Q8h    DISPOSITION:  [x] To remain ICU for close neurological monitoring.     We

## 2018-01-17 NOTE — PLAN OF CARE
Problem: Risk for Impaired Skin Integrity  Goal: Tissue integrity - skin and mucous membranes  Structural intactness and normal physiological function of skin and  mucous membranes. Outcome: Ongoing  No new skin breakdown noted. Patient able to reposition self. Problem: Falls - Risk of  Goal: Absence of falls  Outcome: Ongoing  Patient remains free from falls this shift.

## 2018-01-17 NOTE — PROGRESS NOTES
Department of Endovascular Neurosurgery  Fellow Progress Note      SUBJECTIVE:    No significant events overnight. Continues to complain of burning pain in both eyes. Magnesium 2.3 this morning, given 1g iv infusion. On 75cc/hr. TCD yesterday showing diffuse mild to moderate spasm in the right MCA with max mcbfv of 140cm/sec. OBJECTIVE    Physical  VITALS:  /81   Pulse 51   Temp 98 °F (36.7 °C) (Oral)   Resp 19   Ht 5' 2\" (1.575 m)   Wt 136 lb (61.7 kg)   SpO2 98%   BMI 24.87 kg/m²      NEUROLOGIC:    Alert, oriented to name, place and time. Cranial nerves II-XII are grossly intact. Motor is 5 out of 5 bilaterally.      Data  CBC:   Lab Results   Component Value Date    WBC 10.8 01/17/2018    RBC 3.14 01/17/2018    HGB 10.1 01/17/2018    HCT 30.1 01/17/2018    MCV 95.9 01/17/2018    MCH 32.2 01/17/2018    MCHC 33.6 01/17/2018    RDW 12.8 01/17/2018     01/17/2018    MPV 9.1 01/17/2018     CMP:    Lab Results   Component Value Date     01/17/2018    K 3.3 01/17/2018     01/17/2018    CO2 20 01/17/2018    BUN 4 01/17/2018    CREATININE 0.54 01/17/2018    GFRAA >60 01/17/2018    LABGLOM >60 01/17/2018    GLUCOSE 101 01/17/2018    CALCIUM 7.7 01/17/2018     Current Inpatient Medications  Current Facility-Administered Medications: calcium gluconate 2 g in dextrose 5 % 250 mL IVPB, 2 g, Intravenous, Once PRN  potassium chloride 40 mEq in sodium chloride 0.9 % 500 mL IVPB, 40 mEq, Intravenous, Once **FOLLOWED BY** potassium chloride 20 mEq in sodium chloride 0.9 % 250 mL IVPB, 20 mEq, Intravenous, Once  heparin (porcine) injection 5,000 Units, 5,000 Units, Subcutaneous, 3 times per day  lactulose (CHRONULAC) 10 GM/15ML solution 30 g, 30 g, Oral, BID  famotidine (PEPCID) tablet 20 mg, 20 mg, Oral, BID  0.9 % sodium chloride infusion, , Intravenous, Continuous  magnesium sulfate 1 g in dextrose 5% 100 mL IVPB, 1 g, Intravenous, PRN  gabapentin (NEURONTIN) capsule 100 mg, 100 mg, Oral, TID  niMODipine (NIMOTOP) capsule 60 mg, 60 mg, Oral, 6 times per day  cyclobenzaprine (FLEXERIL) tablet 10 mg, 10 mg, Oral, TID PRN  polyethylene glycol (GLYCOLAX) packet 17 g, 17 g, Oral, Daily  ondansetron (ZOFRAN) injection 4 mg, 4 mg, Intravenous, Q4H PRN  nicotine (NICODERM CQ) 14 MG/24HR 1 patch, 1 patch, Transdermal, Daily  docusate sodium (COLACE) capsule 100 mg, 100 mg, Oral, Daily  magnesium hydroxide (MILK OF MAGNESIA) 400 MG/5ML suspension 30 mL, 30 mL, Oral, Daily PRN  acetaminophen (TYLENOL) tablet 650 mg, 650 mg, Oral, Q4H PRN  sodium chloride flush 0.9 % injection 10 mL, 10 mL, Intravenous, 2 times per day  sodium chloride flush 0.9 % injection 10 mL, 10 mL, Intravenous, PRN  acetaminophen (TYLENOL) tablet 650 mg, 650 mg, Oral, Q4H PRN  oxyCODONE (ROXICODONE) immediate release tablet 5 mg, 5 mg, Oral, Q4H PRN **OR** oxyCODONE (ROXICODONE) immediate release tablet 10 mg, 10 mg, Oral, Q4H PRN  fentaNYL (SUBLIMAZE) injection 25 mcg, 25 mcg, Intravenous, Q1H PRN **OR** [DISCONTINUED] fentaNYL (SUBLIMAZE) injection 50 mcg, 50 mcg, Intravenous, Q1H PRN    ASSESSMENT AND PLAN  45 yo woman presenting with SAH 2/2 ruptured right MCA bifurcation aneurysm on 1/12/2018, HH 2, mF 1.    -->SAH care per neuro ICU  -->sbp to < 220mmHg  -->Goal normonatremia/normothermia/normovolemia.    -->iv magnesium drip with goal 2.5-4  -->daily TCDs

## 2018-01-17 NOTE — CONSULTS
[facility]  In PatientCardiology Consult             Date:   1/17/2018  Patient name: Amadeo Cooper  Date of admission:  1/12/2018  2:47 PM  MRN:   [id number]  YOB: 1975      Date:   1/17/2018  Patient name: Amadeo Cooper  Date of admission:  1/12/2018  2:47 PM  MRN:   [id number]  YOB: 1975    Reason for Admission:  [facility]    CHIEF COMPLAINT: SAH     History Obtained From:  Patient and medical records. HISTORY OF PRESENT ILLNESS:  Transferred from [facility] with [facility] where she presented with a headache. She underwent a coil embolization. Cardiology has been consulted for bradycardia. Past Medical History:   has a past medical history of Caffeinism; Generalized headaches; Low back pain; Migraine; Patient in clinical research study; Recurrent sinus infections; Ruptured middle cerebral artery aneurysm (HCC); SAH (subarachnoid hemorrhage) ([facility] [address] and Tobacco use. Past Surgical History:   has a past surgical history that includes other surgical history (04/02/2013); Tubal ligation (05/24/2011); and Brain aneurysm surgery (01/12/2018). Home Medications:    Prior to Admission medications    Medication Sig Start Date End Date Taking? Authorizing Provider   naproxen sodium (ANAPROX) 220 MG tablet Take 220 mg by mouth every 4-6 hours as needed for Pain   Yes Historical Provider, MD   albuterol sulfate HFA (VENTOLIN HFA) 108 (90 BASE) MCG/ACT inhaler Inhale 2 puffs into the lungs every 4 hours as needed for Wheezing or Shortness of Breath 5/24/17  Yes Baldemar Mcclain MD   ipratropium (ATROVENT) 0.06 % nasal spray 2 sprays by Nasal route 4 times daily as needed for Rhinitis 11/18/17 11/18/18  Jules Metzger MD       Allergies:  Chantix [varenicline] and Prozac [fluoxetine hcl]    Social History:   reports that she has been smoking. She started smoking about 27 years ago. She has been smoking about 1.00 pack per day.  She has never used smokeless tobacco. She

## 2018-01-18 ENCOUNTER — APPOINTMENT (OUTPATIENT)
Dept: CT IMAGING | Age: 43
DRG: 021 | End: 2018-01-18
Payer: COMMERCIAL

## 2018-01-18 LAB
ABSOLUTE EOS #: 0.2 K/UL (ref 0–0.44)
ABSOLUTE IMMATURE GRANULOCYTE: 0.04 K/UL (ref 0–0.3)
ABSOLUTE LYMPH #: 2.69 K/UL (ref 1.1–3.7)
ABSOLUTE MONO #: 1.03 K/UL (ref 0.1–1.2)
ANION GAP SERPL CALCULATED.3IONS-SCNC: 10 MMOL/L (ref 9–17)
BASOPHILS # BLD: 0 % (ref 0–2)
BASOPHILS ABSOLUTE: 0.03 K/UL (ref 0–0.2)
BUN BLDV-MCNC: 4 MG/DL (ref 6–20)
BUN/CREAT BLD: ABNORMAL (ref 9–20)
CALCIUM IONIZED: 1.19 MMOL/L (ref 1.13–1.33)
CALCIUM SERPL-MCNC: 8.4 MG/DL (ref 8.6–10.4)
CHLORIDE BLD-SCNC: 102 MMOL/L (ref 98–107)
CO2: 24 MMOL/L (ref 20–31)
CREAT SERPL-MCNC: 0.57 MG/DL (ref 0.5–0.9)
DIFFERENTIAL TYPE: ABNORMAL
EOSINOPHILS RELATIVE PERCENT: 2 % (ref 1–4)
GFR AFRICAN AMERICAN: >60 ML/MIN
GFR NON-AFRICAN AMERICAN: >60 ML/MIN
GFR SERPL CREATININE-BSD FRML MDRD: ABNORMAL ML/MIN/{1.73_M2}
GFR SERPL CREATININE-BSD FRML MDRD: ABNORMAL ML/MIN/{1.73_M2}
GLUCOSE BLD-MCNC: 100 MG/DL (ref 70–99)
HCT VFR BLD CALC: 35.2 % (ref 36.3–47.1)
HEMOGLOBIN: 11.9 G/DL (ref 11.9–15.1)
IMMATURE GRANULOCYTES: 0 %
LYMPHOCYTES # BLD: 22 % (ref 24–43)
MAGNESIUM: 2.2 MG/DL (ref 1.6–2.6)
MAGNESIUM: 2.6 MG/DL (ref 1.6–2.6)
MAGNESIUM: 3.9 MG/DL (ref 1.6–2.6)
MCH RBC QN AUTO: 32.7 PG (ref 25.2–33.5)
MCHC RBC AUTO-ENTMCNC: 33.8 G/DL (ref 28.4–34.8)
MCV RBC AUTO: 96.7 FL (ref 82.6–102.9)
MONOCYTES # BLD: 9 % (ref 3–12)
NRBC AUTOMATED: 0 PER 100 WBC
OSMOLALITY URINE: 361 MOSM/KG (ref 80–1300)
PDW BLD-RTO: 13 % (ref 11.8–14.4)
PHOSPHORUS: 3.8 MG/DL (ref 2.6–4.5)
PLATELET # BLD: 245 K/UL (ref 138–453)
PLATELET ESTIMATE: ABNORMAL
PMV BLD AUTO: 8.9 FL (ref 8.1–13.5)
POTASSIUM SERPL-SCNC: 4.3 MMOL/L (ref 3.7–5.3)
RBC # BLD: 3.64 M/UL (ref 3.95–5.11)
RBC # BLD: ABNORMAL 10*6/UL
SEG NEUTROPHILS: 67 % (ref 36–65)
SEGMENTED NEUTROPHILS ABSOLUTE COUNT: 8.01 K/UL (ref 1.5–8.1)
SERUM OSMOLALITY: 286 MOSM/KG (ref 275–295)
SODIUM BLD-SCNC: 136 MMOL/L (ref 135–144)
SODIUM,UR: 166 MMOL/L
WBC # BLD: 12 K/UL (ref 3.5–11.3)
WBC # BLD: ABNORMAL 10*3/UL

## 2018-01-18 PROCEDURE — 6370000000 HC RX 637 (ALT 250 FOR IP): Performed by: NURSE PRACTITIONER

## 2018-01-18 PROCEDURE — 84100 ASSAY OF PHOSPHORUS: CPT

## 2018-01-18 PROCEDURE — 85025 COMPLETE CBC W/AUTO DIFF WBC: CPT

## 2018-01-18 PROCEDURE — 83935 ASSAY OF URINE OSMOLALITY: CPT

## 2018-01-18 PROCEDURE — 84300 ASSAY OF URINE SODIUM: CPT

## 2018-01-18 PROCEDURE — 6370000000 HC RX 637 (ALT 250 FOR IP): Performed by: PSYCHIATRY & NEUROLOGY

## 2018-01-18 PROCEDURE — 6360000002 HC RX W HCPCS: Performed by: NEUROLOGICAL SURGERY

## 2018-01-18 PROCEDURE — 70450 CT HEAD/BRAIN W/O DYE: CPT

## 2018-01-18 PROCEDURE — 36415 COLL VENOUS BLD VENIPUNCTURE: CPT

## 2018-01-18 PROCEDURE — 94762 N-INVAS EAR/PLS OXIMTRY CONT: CPT

## 2018-01-18 PROCEDURE — 6360000002 HC RX W HCPCS: Performed by: NURSE PRACTITIONER

## 2018-01-18 PROCEDURE — 97535 SELF CARE MNGMENT TRAINING: CPT

## 2018-01-18 PROCEDURE — 99233 SBSQ HOSP IP/OBS HIGH 50: CPT | Performed by: PSYCHIATRY & NEUROLOGY

## 2018-01-18 PROCEDURE — 2580000003 HC RX 258: Performed by: EMERGENCY MEDICINE

## 2018-01-18 PROCEDURE — 2000000003 HC NEURO ICU R&B

## 2018-01-18 PROCEDURE — 6370000000 HC RX 637 (ALT 250 FOR IP): Performed by: EMERGENCY MEDICINE

## 2018-01-18 PROCEDURE — 83735 ASSAY OF MAGNESIUM: CPT

## 2018-01-18 PROCEDURE — 82330 ASSAY OF CALCIUM: CPT

## 2018-01-18 PROCEDURE — 80048 BASIC METABOLIC PNL TOTAL CA: CPT

## 2018-01-18 PROCEDURE — 6360000002 HC RX W HCPCS: Performed by: PSYCHIATRY & NEUROLOGY

## 2018-01-18 PROCEDURE — 2580000003 HC RX 258: Performed by: NURSE PRACTITIONER

## 2018-01-18 PROCEDURE — 6370000000 HC RX 637 (ALT 250 FOR IP): Performed by: NEUROLOGICAL SURGERY

## 2018-01-18 PROCEDURE — 2580000003 HC RX 258: Performed by: PSYCHIATRY & NEUROLOGY

## 2018-01-18 PROCEDURE — 93886 INTRACRANIAL COMPLETE STUDY: CPT

## 2018-01-18 PROCEDURE — 83930 ASSAY OF BLOOD OSMOLALITY: CPT

## 2018-01-18 PROCEDURE — 6360000002 HC RX W HCPCS: Performed by: EMERGENCY MEDICINE

## 2018-01-18 RX ORDER — LIDOCAINE HYDROCHLORIDE 10 MG/ML
5 INJECTION, SOLUTION INFILTRATION; PERINEURAL ONCE
Status: DISCONTINUED | OUTPATIENT
Start: 2018-01-18 | End: 2018-01-26 | Stop reason: HOSPADM

## 2018-01-18 RX ORDER — DEXAMETHASONE SODIUM PHOSPHATE 10 MG/ML
10 INJECTION INTRAMUSCULAR; INTRAVENOUS ONCE
Status: COMPLETED | OUTPATIENT
Start: 2018-01-18 | End: 2018-01-18

## 2018-01-18 RX ORDER — IBUPROFEN 400 MG/1
800 TABLET ORAL PRN
Status: DISCONTINUED | OUTPATIENT
Start: 2018-01-18 | End: 2018-01-18

## 2018-01-18 RX ORDER — MAGNESIUM SULFATE 4 G/50ML
4 INJECTION INTRAVENOUS ONCE
Status: DISCONTINUED | OUTPATIENT
Start: 2018-01-18 | End: 2018-01-18

## 2018-01-18 RX ORDER — MORPHINE SULFATE 4 MG/ML
4 INJECTION, SOLUTION INTRAMUSCULAR; INTRAVENOUS ONCE
Status: COMPLETED | OUTPATIENT
Start: 2018-01-18 | End: 2018-01-18

## 2018-01-18 RX ORDER — SODIUM CHLORIDE 0.9 % (FLUSH) 0.9 %
10 SYRINGE (ML) INJECTION PRN
Status: DISCONTINUED | OUTPATIENT
Start: 2018-01-18 | End: 2018-01-18 | Stop reason: SDUPTHER

## 2018-01-18 RX ORDER — MORPHINE SULFATE 4 MG/ML
4 INJECTION, SOLUTION INTRAMUSCULAR; INTRAVENOUS EVERY 4 HOURS PRN
Status: DISCONTINUED | OUTPATIENT
Start: 2018-01-18 | End: 2018-01-18

## 2018-01-18 RX ORDER — GABAPENTIN 300 MG/1
300 CAPSULE ORAL 3 TIMES DAILY
Status: DISCONTINUED | OUTPATIENT
Start: 2018-01-18 | End: 2018-01-26 | Stop reason: HOSPADM

## 2018-01-18 RX ORDER — SODIUM CHLORIDE 0.9 % (FLUSH) 0.9 %
10 SYRINGE (ML) INJECTION EVERY 12 HOURS SCHEDULED
Status: DISCONTINUED | OUTPATIENT
Start: 2018-01-18 | End: 2018-01-18 | Stop reason: SDUPTHER

## 2018-01-18 RX ORDER — MORPHINE SULFATE 4 MG/ML
4 INJECTION, SOLUTION INTRAMUSCULAR; INTRAVENOUS ONCE
Status: DISCONTINUED | OUTPATIENT
Start: 2018-01-18 | End: 2018-01-26 | Stop reason: HOSPADM

## 2018-01-18 RX ORDER — MAGNESIUM SULFATE IN WATER 40 MG/ML
4 INJECTION, SOLUTION INTRAVENOUS ONCE
Status: COMPLETED | OUTPATIENT
Start: 2018-01-18 | End: 2018-01-19

## 2018-01-18 RX ORDER — IBUPROFEN 400 MG/1
800 TABLET ORAL EVERY 8 HOURS PRN
Status: DISPENSED | OUTPATIENT
Start: 2018-01-18 | End: 2018-01-19

## 2018-01-18 RX ORDER — 0.9 % SODIUM CHLORIDE 0.9 %
2000 INTRAVENOUS SOLUTION INTRAVENOUS ONCE
Status: COMPLETED | OUTPATIENT
Start: 2018-01-18 | End: 2018-01-18

## 2018-01-18 RX ORDER — 0.9 % SODIUM CHLORIDE 0.9 %
1000 INTRAVENOUS SOLUTION INTRAVENOUS ONCE
Status: COMPLETED | OUTPATIENT
Start: 2018-01-18 | End: 2018-01-18

## 2018-01-18 RX ORDER — MORPHINE SULFATE 2 MG/ML
2 INJECTION, SOLUTION INTRAMUSCULAR; INTRAVENOUS
Status: DISCONTINUED | OUTPATIENT
Start: 2018-01-18 | End: 2018-01-22

## 2018-01-18 RX ORDER — MAGNESIUM SULFATE 4 G/50ML
4 INJECTION INTRAVENOUS ONCE
Status: COMPLETED | OUTPATIENT
Start: 2018-01-18 | End: 2018-01-18

## 2018-01-18 RX ADMIN — FAMOTIDINE 20 MG: 20 TABLET, FILM COATED ORAL at 20:13

## 2018-01-18 RX ADMIN — NIMODIPINE 60 MG: 30 CAPSULE, LIQUID FILLED ORAL at 03:29

## 2018-01-18 RX ADMIN — SIMVASTATIN 40 MG: 40 TABLET, FILM COATED ORAL at 20:17

## 2018-01-18 RX ADMIN — SODIUM CHLORIDE 1000 ML: 9 INJECTION, SOLUTION INTRAVENOUS at 07:51

## 2018-01-18 RX ADMIN — CYCLOBENZAPRINE HYDROCHLORIDE 10 MG: 5 TABLET, FILM COATED ORAL at 21:20

## 2018-01-18 RX ADMIN — SODIUM CHLORIDE 1000 ML: 9 INJECTION, SOLUTION INTRAVENOUS at 15:58

## 2018-01-18 RX ADMIN — OXYCODONE HYDROCHLORIDE 10 MG: 5 TABLET ORAL at 04:59

## 2018-01-18 RX ADMIN — NIMODIPINE 60 MG: 30 CAPSULE, LIQUID FILLED ORAL at 11:26

## 2018-01-18 RX ADMIN — Medication 10 ML: at 20:23

## 2018-01-18 RX ADMIN — IBUPROFEN 800 MG: 400 TABLET ORAL at 11:26

## 2018-01-18 RX ADMIN — MORPHINE SULFATE 2 MG: 2 INJECTION, SOLUTION INTRAMUSCULAR; INTRAVENOUS at 10:41

## 2018-01-18 RX ADMIN — GABAPENTIN 100 MG: 100 CAPSULE ORAL at 08:30

## 2018-01-18 RX ADMIN — MORPHINE SULFATE 2 MG: 2 INJECTION, SOLUTION INTRAMUSCULAR; INTRAVENOUS at 09:29

## 2018-01-18 RX ADMIN — HEPARIN SODIUM 5000 UNITS: 5000 INJECTION, SOLUTION INTRAVENOUS; SUBCUTANEOUS at 05:01

## 2018-01-18 RX ADMIN — IBUPROFEN 800 MG: 400 TABLET, FILM COATED ORAL at 20:12

## 2018-01-18 RX ADMIN — NIMODIPINE 60 MG: 30 CAPSULE, LIQUID FILLED ORAL at 00:04

## 2018-01-18 RX ADMIN — MAGNESIUM HYDROXIDE 30 ML: 400 SUSPENSION ORAL at 13:34

## 2018-01-18 RX ADMIN — HEPARIN SODIUM 5000 UNITS: 5000 INJECTION, SOLUTION INTRAVENOUS; SUBCUTANEOUS at 13:34

## 2018-01-18 RX ADMIN — MAGNESIUM SULFATE IN WATER 4 G: 40 INJECTION, SOLUTION INTRAVENOUS at 20:16

## 2018-01-18 RX ADMIN — MYCOPHENOLATE MOFETIL 300 MG: 500 TABLET ORAL at 20:13

## 2018-01-18 RX ADMIN — MAGNESIUM SULFATE HEPTAHYDRATE 4 G: 500 INJECTION, SOLUTION INTRAMUSCULAR; INTRAVENOUS at 02:43

## 2018-01-18 RX ADMIN — NIMODIPINE 60 MG: 30 CAPSULE, LIQUID FILLED ORAL at 08:23

## 2018-01-18 RX ADMIN — MYCOPHENOLATE MOFETIL 300 MG: 500 TABLET ORAL at 13:34

## 2018-01-18 RX ADMIN — FAMOTIDINE 20 MG: 20 TABLET, FILM COATED ORAL at 08:23

## 2018-01-18 RX ADMIN — DOCUSATE SODIUM 100 MG: 100 CAPSULE, LIQUID FILLED ORAL at 08:23

## 2018-01-18 RX ADMIN — NIMODIPINE 60 MG: 30 CAPSULE, LIQUID FILLED ORAL at 20:13

## 2018-01-18 RX ADMIN — MORPHINE SULFATE 4 MG: 4 INJECTION INTRAVENOUS at 07:07

## 2018-01-18 RX ADMIN — SODIUM CHLORIDE: 9 INJECTION, SOLUTION INTRAVENOUS at 18:16

## 2018-01-18 RX ADMIN — MORPHINE SULFATE 4 MG: 4 INJECTION INTRAVENOUS at 02:45

## 2018-01-18 RX ADMIN — CITROMA MAGNESIUM CITRATE 296 ML: 1.75 LIQUID ORAL at 20:15

## 2018-01-18 RX ADMIN — DEXAMETHASONE SODIUM PHOSPHATE 10 MG: 10 INJECTION INTRAMUSCULAR; INTRAVENOUS at 10:50

## 2018-01-18 RX ADMIN — SODIUM CHLORIDE 2000 ML: 9 INJECTION, SOLUTION INTRAVENOUS at 10:41

## 2018-01-18 RX ADMIN — MORPHINE SULFATE 2 MG: 2 INJECTION, SOLUTION INTRAMUSCULAR; INTRAVENOUS at 08:24

## 2018-01-18 RX ADMIN — SODIUM CHLORIDE: 9 INJECTION, SOLUTION INTRAVENOUS at 09:31

## 2018-01-18 RX ADMIN — ONDANSETRON 4 MG: 2 INJECTION INTRAMUSCULAR; INTRAVENOUS at 11:01

## 2018-01-18 RX ADMIN — OXYCODONE HYDROCHLORIDE 5 MG: 5 TABLET ORAL at 20:11

## 2018-01-18 RX ADMIN — HEPARIN SODIUM 5000 UNITS: 5000 INJECTION, SOLUTION INTRAVENOUS; SUBCUTANEOUS at 20:15

## 2018-01-18 RX ADMIN — NIMODIPINE 60 MG: 30 CAPSULE, LIQUID FILLED ORAL at 15:57

## 2018-01-18 ASSESSMENT — PAIN DESCRIPTION - DESCRIPTORS
DESCRIPTORS: HEADACHE
DESCRIPTORS: ACHING;PRESSURE;HEADACHE
DESCRIPTORS: ACHING;HEADACHE;PRESSURE
DESCRIPTORS: ACHING;PRESSURE;HEADACHE

## 2018-01-18 ASSESSMENT — PAIN DESCRIPTION - LOCATION
LOCATION: HEAD

## 2018-01-18 ASSESSMENT — PAIN DESCRIPTION - FREQUENCY
FREQUENCY: CONTINUOUS

## 2018-01-18 ASSESSMENT — PAIN DESCRIPTION - PROGRESSION
CLINICAL_PROGRESSION: GRADUALLY WORSENING
CLINICAL_PROGRESSION: NOT CHANGED
CLINICAL_PROGRESSION: NOT CHANGED
CLINICAL_PROGRESSION: GRADUALLY WORSENING

## 2018-01-18 ASSESSMENT — PAIN DESCRIPTION - PAIN TYPE
TYPE: ACUTE PAIN

## 2018-01-18 ASSESSMENT — PAIN SCALES - GENERAL
PAINLEVEL_OUTOF10: 10
PAINLEVEL_OUTOF10: 7
PAINLEVEL_OUTOF10: 10
PAINLEVEL_OUTOF10: 10
PAINLEVEL_OUTOF10: 9
PAINLEVEL_OUTOF10: 7
PAINLEVEL_OUTOF10: 4
PAINLEVEL_OUTOF10: 10

## 2018-01-18 ASSESSMENT — PAIN DESCRIPTION - ORIENTATION
ORIENTATION: ANTERIOR

## 2018-01-18 ASSESSMENT — PAIN DESCRIPTION - ONSET
ONSET: ON-GOING

## 2018-01-18 NOTE — PLAN OF CARE
Problem: Pain:  Goal: Pain level will decrease  Pain level will decrease    Outcome: Ongoing  Pain level assessment complete. Pt educated on pain scale and control interventions. PRN pain medication given per pt request. Pt instructed to call out with new onset of pain or unrelieved pain. Will continue to monitor. Problem: Risk for Impaired Skin Integrity  Goal: Tissue integrity - skin and mucous membranes  Structural intactness and normal physiological function of skin and  mucous membranes. Outcome: Ongoing  No new skin breakdown noted. Patient able to reposition self. Problem: Falls - Risk of  Goal: Absence of falls  Outcome: Ongoing  Patient remains free from falls this shift. Bed locked and in lowest position, call light within reach.

## 2018-01-18 NOTE — PROGRESS NOTES
Daily Progress Note  Neuro Critical Care    Patient Name: Amadeo Cooper  Patient : 1975  Room/Bed: 6866/2474-49  Allergies: Allergies   Allergen Reactions    Chantix [Varenicline]      vomiting    Prozac [Fluoxetine Hcl]      cutting     Problem List:   Patient Active Problem List   Diagnosis    Tobacco use    Genital warts    Migraine    SAH (subarachnoid hemorrhage) (HCC)    Aneurysmal subarachnoid hemorrhage (HCC)    Generalized headaches    Ruptured cerebral aneurysm (HCC)    Ruptured middle cerebral artery aneurysm (HCC)       INTERVAL HISTORY      The patient is a 44 y.o. Female who presented to [address] with a thunderclap headache that started around 10:45 AM. She was found to have a [address] on CT head and transferred to [facility]. V's. No significant PMH. Family history of brain aneurysm in her father. Daily smoker. 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         CTH stable right sided predominant subarachnoid hemorrhage in basal cisterns. Started Decadron 2 mg q6h x 4 days for headache. 1/15 Overnight, had a couple episodes of bradycardia, heart rate in the 30s, asymptomatic. Then had another event with HR in the 20s requiring 0.5 mg atropine. Started Neurontin 100 mg TID for continued headache.  Severe headache throughout the day. TCDs showed increasing mean velocities on right.  CTH showed overall improvement with persistent minimal/tract SAH within the right frontotemporal lobes    No acute events overnight. Large amount of urine output overnight with 4425 out over 12 hours. Patient continues to have a severe headache. She voices improvement with PRN morphine. Per nursing she is refusing the Oxycodone as she doesn't feel it helps. Positive for associated photophobia and phonophobia. Laying in bed with a towel over her eyes.   On assessment she is Aox4, answers questions appropriately, and follows all commands. Moves all extremities equally. CURRENT MEDICATIONS:  SCHEDULED MEDICATIONS:   morphine  4 mg Intravenous Once    sodium chloride  1,000 mL Intravenous Once    simvastatin  40 mg Oral Nightly    heparin (porcine)  5,000 Units Subcutaneous 3 times per day    lactulose  30 g Oral BID    famotidine  20 mg Oral BID    gabapentin  100 mg Oral TID    niMODipine  60 mg Oral 6 times per day    polyethylene glycol  17 g Oral Daily    nicotine  1 patch Transdermal Daily    docusate sodium  100 mg Oral Daily    sodium chloride flush  10 mL Intravenous 2 times per day     CONTINUOUS INFUSIONS:   sodium chloride 125 mL/hr at 18 0931     PRN MEDICATIONS:   morphine, magnesium sulfate, cyclobenzaprine, ondansetron, magnesium hydroxide, acetaminophen, sodium chloride flush, acetaminophen, oxyCODONE **OR** oxyCODONE    VITALS:  Temperature Range: Temp: 99.2 °F (37.3 °C) Temp  Av.7 °F (37.1 °C)  Min: 98.2 °F (36.8 °C)  Max: 99.2 °F (37.3 °C)  BP Range: Systolic (81JWI), DZO:687 , Min:95 , VZ     Diastolic (64RHU), RDN:96, Min:48, Max:93    Pulse Range: Pulse  Av.8  Min: 47  Max: 71  Respiration Range: Resp  Av  Min: 12  Max: 21  Current Pulse Ox: SpO2: 100 %  24HR Pulse Ox Range: SpO2  Av.4 %  Min: 94 %  Max: 100 %  Patient Vitals for the past 12 hrs:   BP Temp Pulse Resp SpO2   18 0702 130/75 - 59 16 100 %   18 0602 (!) 105/55 - 54 15 100 %   18 0511 (!) 120/57 - 51 18 100 %   18 0402 133/75 - 55 18 99 %   18 0302 104/63 - 52 17 97 %   18 0202 (!) 95/48 - 54 19 94 %   18 0102 119/77 - 56 21 94 %   18 0002 120/82 99.2 °F (37.3 °C) 57 18 98 %   18 2331 (!) 119/50 - 56 19 97 %   18 2202 122/60 - 57 18 98 %     Estimated body mass index is 24.87 kg/m² as calculated from the following:    Height as of this encounter: 5' 2\" (1.575 m).     Weight as of this encounter: 136 lb (61.7 kg).  []<16 significant mass effect. No midline shift. No hydrocephalus. No mass effect on the basal cisterns. ORBITS: The visualized portion of the orbits demonstrate no acute abnormality. SINUSES: Trace nonspecific bilateral mastoid effusions. Mild ethmoid and maxillary sinus disease. SOFT TISSUES/SKULL:  No acute abnormality of the visualized skull or soft tissues. Overall improvement compared to the prior study with persistent minimal/trace subarachnoid hemorrhage within the sulci of the right frontotemporal lobes. The subarachnoid hemorrhage within the basal cisterns and possible subdural hemorrhage overlying the right temporal lobe have resolved. No new acute intracranial hemorrhage. Ct Head Wo Contrast    Result Date: 1/13/2018  EXAMINATION: CT OF THE HEAD WITHOUT CONTRAST  1/13/2018 5:42 am TECHNIQUE: CT of the head was performed without the administration of intravenous contrast. Dose modulation, iterative reconstruction, and/or weight based adjustment of the mA/kV was utilized to reduce the radiation dose to as low as reasonably achievable. COMPARISON: 01/12/2018, 1156 hours HISTORY: ORDERING SYSTEM PROVIDED HISTORY: post angio, severe HA TECHNOLOGIST PROVIDED HISTORY: Has a \"code stroke\" or \"stroke alert\" been called? ->No 67-year-old female with severe headache status post angio FINDINGS: BRAIN/VENTRICLES: Foramen magnum and 4th ventricle appear patent. Ventricles are normal in size and midline in position. There is stable subarachnoid hemorrhage within the basal cisterns, right greater than left on image 21, series 2. There may be a component of subdural hemorrhage along the anterior aspect of the right temporal lobe on image 21, series 2. Diffuse subarachnoid blood is seen throughout the sulci of the right cerebral hemisphere and right insula, grossly unchanged in appearance from the previous examination. No new intracranial hemorrhage identified.   Gray-white matter differentiation grossly unchanged from the previous examination. Relative preservation of the gray-white matter differentiation. Bilateral basal ganglia, thalami, and insular ribbons are well defined. Streak artifact from aneurysm coiling in the region of the M1 right middle cerebral artery. No midline shift. ORBITS: The visualized portion of the orbits demonstrate no acute abnormality. SINUSES: Air-fluid level within the posterior right maxillary sinus. Mild to moderate opacity in the right maxillary sinus. Moderate opacification of the ethmoid air cells. Mild opacity at the inferior mastoid air cells. SOFT TISSUES/SKULL:  No acute abnormality of the visualized skull or soft tissues. 1. Stable right-sided predominant subarachnoid hemorrhage in the basal cisterns, right greater than left and sulci of the right cerebral hemisphere and right insula without significant change from the prior exam.  There may be a small amount of subdural blood along the anterior aspect of the right temporal lobe, stable. 2. Streak artifact from probable aneurysm coiling in the region of the M1 right middle cerebral artery. 3. No new intracranial hemorrhage identified. 4. Paranasal sinus disease as above. Mild opacification of the inferior mastoid air cells. Ir Angiogram Carotid C Erebral Bilateral    Result Date: 1/14/2018  Date of procedure: 1/12/2018 Procedures performed: 1. Diagnostic cerebral angiography 2. Endovascular treatment of a ruptured cerebral aneurysm 3. Slow IA infusion of nicardipine Neurointerventionalist: Geo Roman Fellow: Quoc Sabillon MD and Leticia Smith MD Fluoro Time: 44.8 minutes Contrast amount: 162 cc of Visipaque-270 Side of access/closure: Right femoral artery.  Anesthesia: General Anesthesia Vessels Catheterized: Right common carotid artery Right internal carotid artery Left common carotid artery Left internal carotid artery Left vertebral artery Right common femoral artery Clinical History: Prema JAVED John?is a 44 y. o.?female?with PMH of smoking and migraine who presented with thunderclap headache at 10:45 am. She was taken to [address] [place] [address] and found to have right sylvian fissure SAH; subsequently she was transferred to our hospital.  The patient is now referred for diagnostic cerebral angiography and urgent endovascular treatment of the cerebral aneurysm with coil embolization. Procedure and Findings: The patient's right groin was prepped and draped in standard sterile fashion and under general anesthesia, a 6 [place]ian short intravascular sheath was placed within the right common femoral artery, establishing arterial access. A 6 [employer] V[employer] guide catheter was then advanced over a guide wire to the level of the aortic arch, and used to selectively catheterize the right common carotid artery. Digital subtraction angiography of the right carotid bifurcation was performed in frontal and lateral projections. Arterial phase images reveal normal antegrade flow through the ICA and ECA. The carotid bifurcation appears without any stenosis. The right internal carotid artery was then selectively catheterized, and digital subtraction angiography of the intracranial right internal carotid circulation was performed in frontal, lateral and oblique projections. 3-D rotational angiography was also  performed for surgical treatment planning, to maximize the safety and efficacy of endovascular coiling. 3-D reconstruction was performed on an independent workstation under concurrent physician supervision. Arterial phase images revealed a narrow necked, irregular cerebral aneurysm arising from the MCA bifurcation, projecting laterally and superiorly, and measuring approximately 3.42 mm x 4.30mm in maximum dimensions, with a 1.3 mm neck. The right A1 segment is absent. There is a prominent PCOM artery. No other aneurysms were seen, and there was no evidence of intracranial arterial stenosis or arteriovenous shunting.  Capillary left internal carotid artery circulation revealed no other evidence of cerebral aneurysm, arteriovenous malformation, or arterial stenosis. Capillary and venous phase images were also unremarkable, with no evidence of veno-occlusive disease. Left VA technique: The left subclavian artery was then selectively catheterized and the left vertebral artery was selectively catheterized with roadmap guidance. Digital subtraction angiography of the intracranial left vertebrobasilar run-off was obtained in frontal and lateral projections. Interpretation: The left vertebral artery injection demonstrates normal antegrade opacification of the left vertebral artery, including the cervical segment, basilar artery, and respective branches. There was no evidence of cerebral aneurysm, arteriovenous malformation, or arterial stenosis. Capillary and venous phase images were unremarkable. There is an anastomosis between the left vertebral artery and the left occipital artery through the posterior radicular branches at the C1 level. The  right PCA is absent, confirming the presence of a right fetal PCA. Retrograde contrast opacification of the contralateral right V4 segment was achieved and demonstrated no evidence of an aneurysm at the origin of the right PICA. Following removal of all catheters, digital subtraction angiography of the right common femoral artery was performed in a frontal oblique projection via injection of contrast through the intravascular sheath. Arterial phase images were unremarkable, with  no evidence of arterial stenosis, dissection, pseudoaneurysm, or arteriovenous fistula. The femoral puncture site was placed near the femoral bifurcation, but vessel size appeared suitable for the use of a closure device. A 6F Vascade was used to achieve hemostasis at the groin.   No complications were experienced, and the patient was discharged from the neuro interventional suite following reversal of general anesthesia, and confirmation of a normal neurological exam. Impression: 1. 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 2. Successful endovascular treatment of the above-mentioned MCA aneurysm by primary coil embolization. Final post-treatment control angiography confirmed complete occlusion of the cerebral aneurysm, with no evidence of parent vessel injury or thromboembolic event. 3. No other aneurysms were seen on this cerebral angiogram.  Normal variants include an atretic right A1 segment, with right LISE territory being supplied by the left ICA, and a fetal right PCA. Dr. Preston Elise dictated this invasive procedure. Dr. Carmela Lewis was present for all procedural and imaging components of this case. Examination was reviewed and reported findings confirmed and edited by Dr. Kemi Valdez. Dr. Kemi Valdez supervised and interpreted this procedure.     Vl Transcranial Doppler Complete    Result Date: 1/17/2018    [facility]  Vascular Transcranial Procedure   Patient Name      Naheed Salcedo        Date of Study             01/16/2018                    Lilyan Eye   Date of Birth     1975   Gender                    Female   Age               44 year(s)   Race                         Room Number       0144   Corporate ID #    [id number]   Patient Acct #    [de-identified]   MR #              [id number]      Sonographer               Sita Winters   Accession #       [id number]    Interpreting Physician    Kj Covert   Referring Nurse                Referring Physician  Practitioner  Additional Comments CLASSIFICATION OF VASOSPASM MEAN MCA VELOCITY ----- MCA/ICA VELOCITY RATIO ------- INTERPRETATION <120 cm/sec --------------------- less than 3 --------------------------- Normal, nonspecific elevation or oriented x 3, in no acute distress. GCS 15. Nontoxic. No dysarthria. No aphasia. HEAD:  normocephalic, atraumatic    EYES:  PERRL, EOMI.   ENT:  moist mucous membranes   NECK:  supple, symmetric, tender   LUNGS:  Equal air entry bilaterally, clear   CARDIOVASCULAR:  normal s1 / s2, RRR   ABDOMEN:  Soft, no rigidity, normal bowel sounds   NEUROLOGIC:  Mental Status:  A & O x3,awake             Cranial Nerves:    cranial nerves II-XII are grossly intact    Motor Exam:    Drift:  absent  Tone:  normal    Motor exam is symmetrical 5 out of 5 all extremities bilaterally    Sensory:    Touch:    Right Upper Extremity:  normal  Left Upper Extremity:  normal  Right Lower Extremity:  normal  Left Lower Extremity:  normal    Coordination/Dysmetria:  Heel to Shin:  Right:  normal  Left:  normal  Finger to Nose:   Right:  normal  Left:  normal   Dysdiadochokinesia:  absent    Gait:  normal     DRAINS:  [x] There are no drains for Neuro Critical Care to monitor at this time. ASSESSMENT AND PLAN:       43 [facility] [address] [facility] for thunderclap headache. Found to have acute right sided predominate SAH. Transferred to [facility] and taken to angio on 1/12 where she was found to have a right MCA bifurcation aneurysm with successful primary coil embolization.          NEUROLOGIC:  - S/P primary coil embolization of right MCA bifurcation aneurysm 1/12  - Repeat [address] [place] [address] 1/13 showed stable right-sided predominant SAH, possible small amount subdural blood along right temporal lobe  - Daily TCDs; 1/17 reports shows mean velocity of up to 162 in right MCA, R lindegaard ratio 4.63  - Nimotop 60mg Q4h to prevent vasospasm   - Goal Magnesium levels 2.5-4 to prevent vasospasm; given 2g Mag Sulfate this Am, recheck 2.4, Q8h Magnesium checks  - Continues to complain of a severe headache and mild neck pain  - Increase Neurontin to 300mg TID   - Pain control; morphine 2mg Q1hPRN, roxicodone 5-10mg Q4hPRN,

## 2018-01-18 NOTE — PROGRESS NOTES
Occupational Therapy  [facility]  Daily Treatment Note  NAME: Isabell Montaño  : 1975  MRN: [id number]    Date of Service: 2018    Patient Diagnosis(es): The primary encounter diagnosis was [facility] A diagnosis of Intracranial aneurysm was also pertinent to this visit. has a past medical history of Caffeinism; Generalized headaches; Low back pain; Migraine; Patient in clinical research study; Recurrent sinus infections; Ruptured middle cerebral artery aneurysm (HCC); SAH (subarachnoid hemorrhage) ([facility] and Tobacco use.   has a past surgical history that includes other surgical history (2013); Tubal ligation (2011); and Brain aneurysm surgery (2018). Restrictions  Restrictions/Precautions  Restrictions/Precautions: General Precautions  Required Braces or Orthoses?: No  Position Activity Restriction  Other position/activity restrictions: Up with assist  Subjective   General  Chart Reviewed: No  Patient assessed for rehabilitation services?: Yes  Family / Caregiver Present: Yes (Dtr)  Diagnosis: SAH. coiling  Pain Assessment  Patient Currently in Pain: Denies  Vital Signs  Patient Currently in Pain: Denies   Orientation  Orientation  Overall Orientation Status: Within Functional Limits  Objective    ADL  Feeding: Independent  Grooming: Independent  UE Bathing: Modified independent   LE Bathing: Modified independent   UE Dressing: Modified independent   LE Dressing: Modified independent   Toileting: Modified independent         Balance  Sitting Balance: Independent  Standing Balance: Modified independent   Standing Balance  Time: 10 min  Activity: Pt stood bedside, sinkside, and func mob around floor  Sit to stand: Independent  Stand to sit: Independent  Functional Mobility  Functional - Mobility Device: No device  Activity: Other; To/from bathroom  Assist Level: Modified independent   Functional Mobility Comments: SLow gait, no

## 2018-01-18 NOTE — PROGRESS NOTES
Department of Endovascular Neurosurgery  Fellow Progress Note      SUBJECTIVE:    Patient is still complaining of severe headache. She is urinating frequently and her I/O balance is negative. OBJECTIVE    Physical  VITALS:  BP 99/67   Pulse 57   Temp 98.8 °F (37.1 °C) (Oral)   Resp 14   Ht 5' 2\" (1.575 m)   Wt 136 lb (61.7 kg)   SpO2 99%   BMI 24.87 kg/m²      NEUROLOGIC:    Alert, oriented to name, place and time. Cranial nerves II-XII are grossly intact. Motor is 5 out of 5 bilaterally.      Data  CBC:   Lab Results   Component Value Date    WBC 12.0 01/18/2018    RBC 3.64 01/18/2018    HGB 11.9 01/18/2018    HCT 35.2 01/18/2018    MCV 96.7 01/18/2018    MCH 32.7 01/18/2018    MCHC 33.8 01/18/2018    RDW 13.0 01/18/2018     01/18/2018    MPV 8.9 01/18/2018     CMP:    Lab Results   Component Value Date     01/18/2018    K 4.3 01/18/2018     01/18/2018    CO2 24 01/18/2018    BUN 4 01/18/2018    CREATININE 0.57 01/18/2018    GFRAA >60 01/18/2018    LABGLOM >60 01/18/2018    GLUCOSE 100 01/18/2018    CALCIUM 8.4 01/18/2018     Current Inpatient Medications  Current Facility-Administered Medications: morphine (PF) injection 4 mg, 4 mg, Intravenous, Once  morphine injection 2 mg, 2 mg, Intravenous, Q1H PRN  gabapentin (NEURONTIN) capsule 300 mg, 300 mg, Oral, TID  ibuprofen (ADVIL;MOTRIN) tablet 800 mg, 800 mg, Oral, Q8H PRN  phenylephrine (CHINMAY-SYNEPHRINE) 50 mg in dextrose 5 % 250 mL infusion, 100 mcg/min, Intravenous, Continuous  lidocaine 1 % injection 5 mL, 5 mL, Intradermal, Once  simvastatin (ZOCOR) tablet 40 mg, 40 mg, Oral, Nightly  heparin (porcine) injection 5,000 Units, 5,000 Units, Subcutaneous, 3 times per day  lactulose (CHRONULAC) 10 GM/15ML solution 30 g, 30 g, Oral, BID  famotidine (PEPCID) tablet 20 mg, 20 mg, Oral, BID  0.9 % sodium chloride infusion, , Intravenous, Continuous  magnesium sulfate 1 g in dextrose 5% 100 mL IVPB, 1 g, Intravenous, PRN  niMODipine

## 2018-01-19 LAB
ABSOLUTE EOS #: 0.16 K/UL (ref 0–0.44)
ABSOLUTE IMMATURE GRANULOCYTE: 0.04 K/UL (ref 0–0.3)
ABSOLUTE LYMPH #: 2.99 K/UL (ref 1.1–3.7)
ABSOLUTE MONO #: 0.99 K/UL (ref 0.1–1.2)
ANION GAP SERPL CALCULATED.3IONS-SCNC: 11 MMOL/L (ref 9–17)
APPEARANCE CSF: ABNORMAL
BASOPHILS # BLD: 0 % (ref 0–2)
BASOPHILS ABSOLUTE: <0.03 K/UL (ref 0–0.2)
BUN BLDV-MCNC: 8 MG/DL (ref 6–20)
BUN/CREAT BLD: ABNORMAL (ref 9–20)
CALCIUM IONIZED: 1.07 MMOL/L (ref 1.13–1.33)
CALCIUM SERPL-MCNC: 7.2 MG/DL (ref 8.6–10.4)
CHLORIDE BLD-SCNC: 107 MMOL/L (ref 98–107)
CO2: 21 MMOL/L (ref 20–31)
CREAT SERPL-MCNC: 0.48 MG/DL (ref 0.5–0.9)
DIFFERENTIAL TYPE: ABNORMAL
DIRECT EXAM: NORMAL
DIRECT EXAM: NORMAL
EOSINOPHILS RELATIVE PERCENT: 2 % (ref 1–4)
GFR AFRICAN AMERICAN: >60 ML/MIN
GFR NON-AFRICAN AMERICAN: >60 ML/MIN
GFR SERPL CREATININE-BSD FRML MDRD: ABNORMAL ML/MIN/{1.73_M2}
GFR SERPL CREATININE-BSD FRML MDRD: ABNORMAL ML/MIN/{1.73_M2}
GLUCOSE BLD-MCNC: 106 MG/DL (ref 70–99)
GLUCOSE, CSF: 35 MG/DL (ref 40–70)
HCT VFR BLD CALC: 30.8 % (ref 36.3–47.1)
HEMOGLOBIN: 10.1 G/DL (ref 11.9–15.1)
IMMATURE GRANULOCYTES: 0 %
LYMPHOCYTES # BLD: 27 % (ref 24–43)
Lab: NORMAL
MAGNESIUM: 2.6 MG/DL (ref 1.6–2.6)
MAGNESIUM: 2.9 MG/DL (ref 1.6–2.6)
MAGNESIUM: 3.4 MG/DL (ref 1.6–2.6)
MCH RBC QN AUTO: 31.6 PG (ref 25.2–33.5)
MCHC RBC AUTO-ENTMCNC: 32.8 G/DL (ref 28.4–34.8)
MCV RBC AUTO: 96.3 FL (ref 82.6–102.9)
MONOCYTES # BLD: 9 % (ref 3–12)
NRBC AUTOMATED: 0 PER 100 WBC
PDW BLD-RTO: 13.2 % (ref 11.8–14.4)
PHOSPHORUS: 3.2 MG/DL (ref 2.6–4.5)
PLATELET # BLD: 229 K/UL (ref 138–453)
PLATELET ESTIMATE: ABNORMAL
PMV BLD AUTO: 9 FL (ref 8.1–13.5)
POTASSIUM SERPL-SCNC: 3.9 MMOL/L (ref 3.7–5.3)
PROTEIN CSF: 61 MG/DL (ref 15–45)
RBC # BLD: 3.2 M/UL (ref 3.95–5.11)
RBC # BLD: ABNORMAL 10*6/UL
RBC CSF: ABNORMAL /MM3
SEG NEUTROPHILS: 62 % (ref 36–65)
SEGMENTED NEUTROPHILS ABSOLUTE COUNT: 6.79 K/UL (ref 1.5–8.1)
SODIUM BLD-SCNC: 139 MMOL/L (ref 135–144)
SODIUM BLD-SCNC: 141 MMOL/L (ref 135–144)
SPECIMEN DESCRIPTION: NORMAL
STATUS: NORMAL
SUPERNAT COLOR CSF: ABNORMAL
TUBE NUMBER CSF: 3
VOLUME CSF: 23
WBC # BLD: 11 K/UL (ref 3.5–11.3)
WBC # BLD: ABNORMAL 10*3/UL
WBC CSF: 693 /MM3
XANTHOCHROMIA: PRESENT

## 2018-01-19 PROCEDURE — 82330 ASSAY OF CALCIUM: CPT

## 2018-01-19 PROCEDURE — 87070 CULTURE OTHR SPECIMN AEROBIC: CPT

## 2018-01-19 PROCEDURE — 6360000002 HC RX W HCPCS: Performed by: PSYCHIATRY & NEUROLOGY

## 2018-01-19 PROCEDURE — 36415 COLL VENOUS BLD VENIPUNCTURE: CPT

## 2018-01-19 PROCEDURE — 6370000000 HC RX 637 (ALT 250 FOR IP): Performed by: NEUROLOGICAL SURGERY

## 2018-01-19 PROCEDURE — 6370000000 HC RX 637 (ALT 250 FOR IP): Performed by: PSYCHIATRY & NEUROLOGY

## 2018-01-19 PROCEDURE — 6370000000 HC RX 637 (ALT 250 FOR IP): Performed by: NURSE PRACTITIONER

## 2018-01-19 PROCEDURE — 83735 ASSAY OF MAGNESIUM: CPT

## 2018-01-19 PROCEDURE — 6360000002 HC RX W HCPCS: Performed by: NEUROLOGICAL SURGERY

## 2018-01-19 PROCEDURE — 82945 GLUCOSE OTHER FLUID: CPT

## 2018-01-19 PROCEDURE — 2580000003 HC RX 258: Performed by: EMERGENCY MEDICINE

## 2018-01-19 PROCEDURE — 89051 BODY FLUID CELL COUNT: CPT

## 2018-01-19 PROCEDURE — 62270 DX LMBR SPI PNXR: CPT | Performed by: PSYCHIATRY & NEUROLOGY

## 2018-01-19 PROCEDURE — 6360000002 HC RX W HCPCS: Performed by: NURSE PRACTITIONER

## 2018-01-19 PROCEDURE — 009U3ZZ DRAINAGE OF SPINAL CANAL, PERCUTANEOUS APPROACH: ICD-10-PCS | Performed by: PSYCHIATRY & NEUROLOGY

## 2018-01-19 PROCEDURE — 87015 SPECIMEN INFECT AGNT CONCNTJ: CPT

## 2018-01-19 PROCEDURE — 99232 SBSQ HOSP IP/OBS MODERATE 35: CPT | Performed by: PSYCHIATRY & NEUROLOGY

## 2018-01-19 PROCEDURE — 6370000000 HC RX 637 (ALT 250 FOR IP): Performed by: EMERGENCY MEDICINE

## 2018-01-19 PROCEDURE — 99233 SBSQ HOSP IP/OBS HIGH 50: CPT | Performed by: PSYCHIATRY & NEUROLOGY

## 2018-01-19 PROCEDURE — 93886 INTRACRANIAL COMPLETE STUDY: CPT

## 2018-01-19 PROCEDURE — 2580000003 HC RX 258: Performed by: PSYCHIATRY & NEUROLOGY

## 2018-01-19 PROCEDURE — 97116 GAIT TRAINING THERAPY: CPT

## 2018-01-19 PROCEDURE — 2000000003 HC NEURO ICU R&B

## 2018-01-19 PROCEDURE — 94762 N-INVAS EAR/PLS OXIMTRY CONT: CPT

## 2018-01-19 PROCEDURE — 87205 SMEAR GRAM STAIN: CPT

## 2018-01-19 PROCEDURE — 80048 BASIC METABOLIC PNL TOTAL CA: CPT

## 2018-01-19 PROCEDURE — 85025 COMPLETE CBC W/AUTO DIFF WBC: CPT

## 2018-01-19 PROCEDURE — 84157 ASSAY OF PROTEIN OTHER: CPT

## 2018-01-19 PROCEDURE — 84295 ASSAY OF SERUM SODIUM: CPT

## 2018-01-19 PROCEDURE — 84100 ASSAY OF PHOSPHORUS: CPT

## 2018-01-19 RX ORDER — MAGNESIUM SULFATE IN WATER 40 MG/ML
4 INJECTION, SOLUTION INTRAVENOUS ONCE
Status: COMPLETED | OUTPATIENT
Start: 2018-01-19 | End: 2018-01-19

## 2018-01-19 RX ORDER — SODIUM CHLORIDE 0.9 % (FLUSH) 0.9 %
10 SYRINGE (ML) INJECTION EVERY 12 HOURS SCHEDULED
Status: DISCONTINUED | OUTPATIENT
Start: 2018-01-19 | End: 2018-01-26 | Stop reason: HOSPADM

## 2018-01-19 RX ORDER — SODIUM CHLORIDE 0.9 % (FLUSH) 0.9 %
10 SYRINGE (ML) INJECTION PRN
Status: DISCONTINUED | OUTPATIENT
Start: 2018-01-19 | End: 2018-01-26 | Stop reason: HOSPADM

## 2018-01-19 RX ORDER — FENTANYL CITRATE 50 UG/ML
INJECTION, SOLUTION INTRAMUSCULAR; INTRAVENOUS
Status: DISCONTINUED
Start: 2018-01-19 | End: 2018-01-19 | Stop reason: WASHOUT

## 2018-01-19 RX ORDER — 0.9 % SODIUM CHLORIDE 0.9 %
2000 INTRAVENOUS SOLUTION INTRAVENOUS ONCE
Status: COMPLETED | OUTPATIENT
Start: 2018-01-19 | End: 2018-01-19

## 2018-01-19 RX ORDER — MAGNESIUM SULFATE 1 G/100ML
1 INJECTION INTRAVENOUS
Status: COMPLETED | OUTPATIENT
Start: 2018-01-19 | End: 2018-01-19

## 2018-01-19 RX ADMIN — IBUPROFEN 800 MG: 400 TABLET, FILM COATED ORAL at 09:28

## 2018-01-19 RX ADMIN — MAGNESIUM SULFATE HEPTAHYDRATE 1 G: 1 INJECTION, SOLUTION INTRAVENOUS at 12:32

## 2018-01-19 RX ADMIN — NIMODIPINE 60 MG: 30 CAPSULE, LIQUID FILLED ORAL at 04:30

## 2018-01-19 RX ADMIN — NIMODIPINE 60 MG: 30 CAPSULE, LIQUID FILLED ORAL at 19:31

## 2018-01-19 RX ADMIN — MORPHINE SULFATE 2 MG: 2 INJECTION, SOLUTION INTRAMUSCULAR; INTRAVENOUS at 17:15

## 2018-01-19 RX ADMIN — SODIUM CHLORIDE: 9 INJECTION, SOLUTION INTRAVENOUS at 07:21

## 2018-01-19 RX ADMIN — MYCOPHENOLATE MOFETIL 300 MG: 500 TABLET ORAL at 20:47

## 2018-01-19 RX ADMIN — ONDANSETRON 4 MG: 2 INJECTION INTRAMUSCULAR; INTRAVENOUS at 08:45

## 2018-01-19 RX ADMIN — NIMODIPINE 60 MG: 30 CAPSULE, LIQUID FILLED ORAL at 12:32

## 2018-01-19 RX ADMIN — MORPHINE SULFATE 2 MG: 2 INJECTION, SOLUTION INTRAMUSCULAR; INTRAVENOUS at 08:45

## 2018-01-19 RX ADMIN — MAGNESIUM SULFATE IN WATER 4 G: 40 INJECTION, SOLUTION INTRAVENOUS at 20:47

## 2018-01-19 RX ADMIN — SODIUM CHLORIDE 2000 ML: 9 INJECTION, SOLUTION INTRAVENOUS at 19:26

## 2018-01-19 RX ADMIN — LACTULOSE 30 G: 10 SOLUTION ORAL at 08:54

## 2018-01-19 RX ADMIN — NIMODIPINE 60 MG: 30 CAPSULE, LIQUID FILLED ORAL at 08:54

## 2018-01-19 RX ADMIN — OXYCODONE HYDROCHLORIDE 5 MG: 5 TABLET ORAL at 04:32

## 2018-01-19 RX ADMIN — DOCUSATE SODIUM 100 MG: 100 CAPSULE, LIQUID FILLED ORAL at 08:54

## 2018-01-19 RX ADMIN — CYCLOBENZAPRINE HYDROCHLORIDE 10 MG: 5 TABLET, FILM COATED ORAL at 09:28

## 2018-01-19 RX ADMIN — FAMOTIDINE 20 MG: 20 TABLET, FILM COATED ORAL at 08:54

## 2018-01-19 RX ADMIN — POLYETHYLENE GLYCOL 3350 17 G: 17 POWDER, FOR SOLUTION ORAL at 08:54

## 2018-01-19 RX ADMIN — NIMODIPINE 60 MG: 30 CAPSULE, LIQUID FILLED ORAL at 00:20

## 2018-01-19 RX ADMIN — MORPHINE SULFATE 2 MG: 2 INJECTION, SOLUTION INTRAMUSCULAR; INTRAVENOUS at 21:59

## 2018-01-19 RX ADMIN — HEPARIN SODIUM 5000 UNITS: 5000 INJECTION, SOLUTION INTRAVENOUS; SUBCUTANEOUS at 21:58

## 2018-01-19 RX ADMIN — OXYCODONE HYDROCHLORIDE 5 MG: 5 TABLET ORAL at 00:20

## 2018-01-19 RX ADMIN — MORPHINE SULFATE 2 MG: 2 INJECTION, SOLUTION INTRAMUSCULAR; INTRAVENOUS at 07:21

## 2018-01-19 RX ADMIN — MAGNESIUM SULFATE HEPTAHYDRATE 1 G: 1 INJECTION, SOLUTION INTRAVENOUS at 11:18

## 2018-01-19 RX ADMIN — HEPARIN SODIUM 5000 UNITS: 5000 INJECTION, SOLUTION INTRAVENOUS; SUBCUTANEOUS at 05:51

## 2018-01-19 RX ADMIN — ONDANSETRON 4 MG: 2 INJECTION INTRAMUSCULAR; INTRAVENOUS at 03:11

## 2018-01-19 RX ADMIN — MYCOPHENOLATE MOFETIL 300 MG: 500 TABLET ORAL at 08:54

## 2018-01-19 RX ADMIN — FAMOTIDINE 20 MG: 20 TABLET, FILM COATED ORAL at 19:31

## 2018-01-19 RX ADMIN — MORPHINE SULFATE 2 MG: 2 INJECTION, SOLUTION INTRAMUSCULAR; INTRAVENOUS at 10:47

## 2018-01-19 RX ADMIN — CYCLOBENZAPRINE HYDROCHLORIDE 10 MG: 5 TABLET, FILM COATED ORAL at 15:19

## 2018-01-19 RX ADMIN — MYCOPHENOLATE MOFETIL 300 MG: 500 TABLET ORAL at 15:19

## 2018-01-19 RX ADMIN — MORPHINE SULFATE 2 MG: 2 INJECTION, SOLUTION INTRAMUSCULAR; INTRAVENOUS at 12:17

## 2018-01-19 RX ADMIN — SIMVASTATIN 40 MG: 40 TABLET, FILM COATED ORAL at 19:31

## 2018-01-19 RX ADMIN — CYCLOBENZAPRINE HYDROCHLORIDE 10 MG: 5 TABLET, FILM COATED ORAL at 22:11

## 2018-01-19 RX ADMIN — OXYCODONE HYDROCHLORIDE 10 MG: 5 TABLET ORAL at 15:19

## 2018-01-19 RX ADMIN — CALCIUM GLUCONATE 2 G: 94 INJECTION, SOLUTION INTRAVENOUS at 21:59

## 2018-01-19 RX ADMIN — MORPHINE SULFATE 2 MG: 2 INJECTION, SOLUTION INTRAMUSCULAR; INTRAVENOUS at 03:11

## 2018-01-19 RX ADMIN — NIMODIPINE 60 MG: 30 CAPSULE, LIQUID FILLED ORAL at 15:19

## 2018-01-19 RX ADMIN — MORPHINE SULFATE 2 MG: 2 INJECTION, SOLUTION INTRAMUSCULAR; INTRAVENOUS at 14:11

## 2018-01-19 RX ADMIN — Medication 10 ML: at 08:55

## 2018-01-19 ASSESSMENT — PAIN SCALES - GENERAL
PAINLEVEL_OUTOF10: 7
PAINLEVEL_OUTOF10: 7
PAINLEVEL_OUTOF10: 8
PAINLEVEL_OUTOF10: 6
PAINLEVEL_OUTOF10: 7
PAINLEVEL_OUTOF10: 4
PAINLEVEL_OUTOF10: 8
PAINLEVEL_OUTOF10: 2
PAINLEVEL_OUTOF10: 6
PAINLEVEL_OUTOF10: 7
PAINLEVEL_OUTOF10: 4
PAINLEVEL_OUTOF10: 8
PAINLEVEL_OUTOF10: 7
PAINLEVEL_OUTOF10: 4

## 2018-01-19 ASSESSMENT — PAIN DESCRIPTION - LOCATION
LOCATION: HEAD

## 2018-01-19 ASSESSMENT — PAIN DESCRIPTION - DIRECTION: RADIATING_TOWARDS: '

## 2018-01-19 ASSESSMENT — PAIN DESCRIPTION - DESCRIPTORS
DESCRIPTORS: ACHING

## 2018-01-19 ASSESSMENT — PAIN DESCRIPTION - FREQUENCY
FREQUENCY: CONTINUOUS

## 2018-01-19 ASSESSMENT — PAIN DESCRIPTION - PAIN TYPE
TYPE: ACUTE PAIN

## 2018-01-19 ASSESSMENT — PAIN DESCRIPTION - ONSET
ONSET: ON-GOING
ONSET: ON-GOING

## 2018-01-19 ASSESSMENT — PAIN DESCRIPTION - PROGRESSION: CLINICAL_PROGRESSION: NOT CHANGED

## 2018-01-19 NOTE — PROGRESS NOTES
Daily Progress Note  Neuro Critical Care    Patient Name: Carlos Brandon  Patient : 1975  Room/Bed: 7991/4910-16  Allergies: Allergies   Allergen Reactions    Chantix [Varenicline]      vomiting    Prozac [Fluoxetine Hcl]      cutting     Problem List:   Patient Active Problem List   Diagnosis    Tobacco use    Genital warts    Migraine    SAH (subarachnoid hemorrhage) (HCC)    Aneurysmal subarachnoid hemorrhage (HCC)    Generalized headaches    Ruptured cerebral aneurysm (HCC)    Ruptured middle cerebral artery aneurysm (HCC)    Cerebral salt-wasting       INTERVAL HISTORY      The patient is a 44 y.o. Female who presented to [address] 18 with a thunderclap headache that started around 10:45 AM. She was found to have a [address] on CT head and transferred to [facility]. V's. No significant PMH. Family history of brain aneurysm in her father. Daily smoker. 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         CTH stable right sided predominant subarachnoid hemorrhage in basal cisterns. Started Decadron 2 mg q6h x 4 days for headache. 1/15 Overnight, had a couple episodes of bradycardia, heart rate in the 30s, asymptomatic. Then had another event with HR in the 20s requiring 0.5 mg atropine. Started Neurontin 100 mg TID for continued headache.  Severe headache throughout the day. TCDs showed increasing mean velocities on right.  CTH showed overall improvement with persistent minimal/tract SAH within the right frontotemporal lobes    No acute events overnight. Large amount of urine output overnight with 4425 out over 12 hours. Patient continues to have a severe headache. She voices improvement with PRN morphine. Per nursing she is refusing the Oxycodone as she doesn't feel it helps. Positive for associated photophobia and phonophobia. Laying in bed with a towel over her eyes.   On assessment she is Aox4, answers questions appropriately, and follows all commands. Moves all extremities equally. : No acute events overnight. Pt continues to have improvement in headache with current regimen. She continues to be neurologically intact. Tolerating PO. Her magnesium this AM went from 3.4 to 2.9 and she was given another 2g replacement this morning. She continues to have large UOP but her loss is being matched in replacement IV and PO. She had UOP of 6250 cc and intake of 6513 over the last 24 hrs. Pt able to ambulate.       CURRENT MEDICATIONS:  SCHEDULED MEDICATIONS:   sodium chloride flush  10 mL Intravenous 2 times per day    morphine  4 mg Intravenous Once    gabapentin  300 mg Oral TID    lidocaine 1 % injection  5 mL Intradermal Once    simvastatin  40 mg Oral Nightly    heparin (porcine)  5,000 Units Subcutaneous 3 times per day    lactulose  30 g Oral BID    famotidine  20 mg Oral BID    niMODipine  60 mg Oral 6 times per day    polyethylene glycol  17 g Oral Daily    nicotine  1 patch Transdermal Daily    docusate sodium  100 mg Oral Daily     CONTINUOUS INFUSIONS:   phenylephrine (CHINMAY-SYNEPHRINE) 50mg/250mL infusion Stopped (18)    sodium chloride 150 mL/hr at 18 0721     PRN MEDICATIONS:   sodium chloride flush, morphine, magnesium sulfate, cyclobenzaprine, ondansetron, magnesium hydroxide, acetaminophen, oxyCODONE **OR** oxyCODONE    VITALS:  Temperature Range: Temp: 97.3 °F (36.3 °C) Temp  Av.1 °F (36.7 °C)  Min: 97.3 °F (36.3 °C)  Max: 99.3 °F (37.4 °C)  BP Range: Systolic (44HWI), FVW:506 , Min:98 , QQS:733     Diastolic (16NMD), MPD:94, Min:55, Max:82    Pulse Range: Pulse  Av.7  Min: 47  Max: 67  Respiration Range: Resp  Av.4  Min: 14  Max: 26  Current Pulse Ox: SpO2: 97 %  24HR Pulse Ox Range: SpO2  Av.3 %  Min: 92 %  Max: 100 %  Patient Vitals for the past 12 hrs:   BP Temp Temp src Pulse Resp SpO2 Height   18 1424 110/67 - - 64 17 97 % -   18 1402 (!) 103/59 - - 67 26 98 % -   01/19/18 1302 108/68 - - 52 17 96 % -   01/19/18 1203 112/82 97.3 °F (36.3 °C) Oral 54 16 99 % -   01/19/18 1200 - - - 54 15 98 % -   01/19/18 1138 - - - - - - 5' 2\" (1.575 m)   01/19/18 1000 124/79 - - 54 16 99 % -   01/19/18 0900 113/64 - - 53 17 98 % -   01/19/18 0800 106/60 99.3 °F (37.4 °C) Oral (!) 49 16 100 % -   01/19/18 0727 117/69 - - 50 17 100 % -   01/19/18 0602 123/75 - - 54 17 98 % -   01/19/18 0536 120/67 - - 51 16 98 % -   01/19/18 0533 110/73 - - (!) 47 19 97 % -   01/19/18 0502 113/76 - - 57 15 97 % -     Estimated body mass index is 24.87 kg/m² as calculated from the following:    Height as of this encounter: 5' 2\" (1.575 m).     Weight as of this encounter: 136 lb (61.7 kg).  []<16 Severe malnutrition  []1616.99 Moderate malnutrition  []1718.49 Mild malnutrition  [x]18.524.9 Normal  []2529.9 Overweight (not obese)  []3034.9 Obese class 1 (Low Risk)  []3539.9 Obese class 2 (Moderate Risk)  []?40 Obese class 3 (High Risk)    RECENT LABS:   Lab Results   Component Value Date    WBC 11.0 01/19/2018    HGB 10.1 (L) 01/19/2018    HCT 30.8 (L) 01/19/2018     01/19/2018    CHOL 85 01/13/2018    TRIG 78 01/13/2018    HDL 32 (L) 01/13/2018     01/19/2018    K 3.9 01/19/2018     01/19/2018    CREATININE 0.48 (L) 01/19/2018    BUN 8 01/19/2018    CO2 21 01/19/2018    TSH 5.07 (H) 01/17/2018     24 HOUR INTAKE/OUTPUT:    Intake/Output Summary (Last 24 hours) at 01/19/18 1621  Last data filed at 01/19/18 1203   Gross per 24 hour   Intake             8227 ml   Output             4100 ml   Net             4127 ml       RADIOLOGY:   Ct Head Wo Contrast    Result Date: 1/18/2018  EXAMINATION: CT OF THE HEAD WITHOUT CONTRAST  1/18/2018 1:41 am TECHNIQUE: CT of the head was performed without the administration of intravenous contrast. Dose modulation, iterative reconstruction, and/or weight based adjustment of the mA/kV was utilized to reduce the radiation stroke\" or \"stroke alert\" been called? ->No 52-year-old female with severe headache status post angio FINDINGS: BRAIN/VENTRICLES: Foramen magnum and 4th ventricle appear patent. Ventricles are normal in size and midline in position. There is stable subarachnoid hemorrhage within the basal cisterns, right greater than left on image 21, series 2. There may be a component of subdural hemorrhage along the anterior aspect of the right temporal lobe on image 21, series 2. Diffuse subarachnoid blood is seen throughout the sulci of the right cerebral hemisphere and right insula, grossly unchanged in appearance from the previous examination. No new intracranial hemorrhage identified. Gray-white matter differentiation grossly unchanged from the previous examination. Relative preservation of the gray-white matter differentiation. Bilateral basal ganglia, thalami, and insular ribbons are well defined. Streak artifact from aneurysm coiling in the region of the M1 right middle cerebral artery. No midline shift. ORBITS: The visualized portion of the orbits demonstrate no acute abnormality. SINUSES: Air-fluid level within the posterior right maxillary sinus. Mild to moderate opacity in the right maxillary sinus. Moderate opacification of the ethmoid air cells. Mild opacity at the inferior mastoid air cells. SOFT TISSUES/SKULL:  No acute abnormality of the visualized skull or soft tissues. 1. Stable right-sided predominant subarachnoid hemorrhage in the basal cisterns, right greater than left and sulci of the right cerebral hemisphere and right insula without significant change from the prior exam.  There may be a small amount of subdural blood along the anterior aspect of the right temporal lobe, stable. 2. Streak artifact from probable aneurysm coiling in the region of the M1 right middle cerebral artery. 3. No new intracranial hemorrhage identified. 4. Paranasal sinus disease as above.   Mild opacification of the inferior mastoid air cells. Ir Angiogram Carotid C Erebral Bilateral    Result Date: 1/14/2018  Date of procedure: 1/12/2018 Procedures performed: 1. Diagnostic cerebral angiography 2. Endovascular treatment of a ruptured cerebral aneurysm 3. Slow IA infusion of nicardipine Neurointerventionalist: Geo Roman Fellow: Petra Mitchell MD and Nghia Hitchcock MD Fluoro Time: 44.8 minutes Contrast amount: 162 cc of Visipaque-270 Side of access/closure: Right femoral artery. Anesthesia: General Anesthesia Vessels Catheterized: Right common carotid artery Right internal carotid artery Left common carotid artery Left internal carotid artery Left vertebral artery Right common femoral artery Clinical History: Prema Lopez?is a 44 y. o.?female?with PMH of smoking and migraine who presented with thunderclap headache at 10:45 am. She was taken to [address] [place] [address] and found to have right sylvian fissure SAH; subsequently she was transferred to our hospital.  The patient is now referred for diagnostic cerebral angiography and urgent endovascular treatment of the cerebral aneurysm with coil embolization. Procedure and Findings: The patient's right groin was prepped and draped in standard sterile fashion and under general anesthesia, a 6 [place] short intravascular sheath was placed within the right common femoral artery, establishing arterial access. A 6 [employer] [place] guide catheter was then advanced over a guide wire to the level of the aortic arch, and used to selectively catheterize the right common carotid artery. Digital subtraction angiography of the right carotid bifurcation was performed in frontal and lateral projections. Arterial phase images reveal normal antegrade flow through the ICA and ECA. The carotid bifurcation appears without any stenosis.   The right internal carotid artery was then selectively catheterized, and digital subtraction angiography of the intracranial right internal carotid circulation MCA bifurcation aneurysm with successful primary coil embolization. Sofia improving choco w/ morphine added to regimen. No neuro deficits. NEUROLOGIC:  - S/P primary coil embolization of right MCA bifurcation aneurysm 1/12  - Repeat [facility] 1/13 showed stable right-sided predominant SAH, possible small amount subdural blood along right temporal lobe  - Daily TCDs; 1/17 reports shows mean velocity of up to 162 in right MCA, R lindegaard ratio 4.63  - Nimotop 60mg Q4h to prevent vasospasm   - Goal Magnesium levels 2.5-4 to prevent vasospasm; given 2g Mag Sulfate this Am, Q8h Magnesium checks  - Increase Neurontin to 300mg TID   - Pain control; morphine 2mg Q1hPRN, roxicodone 5-10mg Q4hPRN, flexeril 10mg TIDPRN  - Finished decadron, on flexeril and percocet, and neurotin  - Off AED after aneurysm secured  - Neuro checks per protocol; continue to monitor closely for changes in neuro status    CARDIOVASCULAR:  - SBP goal <220, MAP >75  - Multiple episodes of bradycardia throughout stay  - HR >47 overnight ,bradycardic to 30s that is non sustained and short lived  - Negative trops, EKG sinus angeles  - Cardiology following; appreciate recommendations, no further cardiac testing  - Continue telemetry     PULMONARY:  - Maintaining O2 sats on room air  - Continue to monitor     RENAL/FLUID/ELECTROLYTE:  - BUN 8/ Creatinine . 48  - Large urine ouput 4.2ml/kg/hr, concern for cerebral salt wasting   - Goal euvolemia  - Increase fluids NS @150ml/hr  - Sodium 139 this AM (down from 136), continue to monitor for hyponatremia  - Goal Mag 2.5-4 to help prevent vasospasm, Q8h magnesium checks  - Mg 2.6 this AM, replete with 2g  - Replace electrolytes PRN  - Daily BMP    GI/NUTRITION:  NUTRITION:  DIET GENERAL;   - + BM  - Pepcid 20mg BID for GI ppx, received steroid today  - Colace 100mg Qd, lactulose 30g BID, Glycolax QD for consipation    ID/HEME:  - Afebrile, tmax 37.4  - Mild leukocytosis, improved with WBC at 11 today  - H&H stable, 10. 1/30.8  - Platelets 499  - Daily CBC     ENDOCRINE:  - Continue to monitor blood glucose    OTHER:  - PT/OT    PROPHYLAXIS:  Stress ulcer: H2 blocker, pepcid 20mg BID, received steroid today    DVT PROPHYLAXIS:  - SCD sleeves - Thigh High   - AIDAN stockings - Thigh High  - Heparin 5000units Q8h    DISPOSITION:  [x] To remain ICU for close neurological monitoring. We will continue to follow along. For any changes in exam or patient status please contact Neuro Critical Care. Alon Cross MD  Neuro Critical Care  Pager [phone]  1/19/2018     4:21 PM       Stroke and Neuroscience Intensive Care Attending:    I obtained brief history, examined the patient,reviewed the residents note and agree with the documented findings and plan of care. Any areas of disagreement are noted on the chart. I agree with the chief complaint, past medical history, past surgical history, allergies, medications, social and family history as documented unless otherwise noted above. Stable neurological examination. Improved headaches this am.  - Proceed with LP.  - Will drain around 25 cc of CSF.  - Fluid replacement with crystalloids.  - Monitor for cerebral salt wasting.  - Close neuro-monitoring. Tommy Mendoza [address], Vascular neurology.   [address] [facility] Stroke [place]

## 2018-01-19 NOTE — PROCEDURES
Lumbar Puncture Procedure Note    Indication: Suspected increased intracranial pressure given persistent HA after aneurysm coiling    Consent: The patient was counseled regarding the procedure, it's indications, risks, potential complications and alternatives and any questions were answered. Consent was obtained. Procedure: The patient was placed in the left lateral decubitus position and the appropriate landmarks were identified. The area was prepped and draped in the usual sterile fashion. Anesthesia was obtained using 4 cc of 1% Lidocaine without epinephrine. A spinal needle was inserted at the L3- L4 level with the stylet in place until spinal fluid was returned. Opening pressure was 35. At this point 24.0 cc of bloody cerebral spinal fluid was obtained and sent for appropriate testing. The closing pressure was 13. The stylet was then replaced and the needle was withdrawn. A sterile dressing was placed over the site and the patient was placed in the supine position. The patient tolerated the procedure well. Complications: None    Electronically signed by Meryle Began, MD on 1/19/18 at 4:27 PM    I was present for the entire procedure. Melva Corona [address], Vascular neurology.   [address] [facility]

## 2018-01-19 NOTE — PLAN OF CARE
Problem: Pain:  Goal: Pain level will decrease  Pain level will decrease    Outcome: Ongoing  Patient states understanding of pain scale and interventions. Pain assessed with hourly rounding and PRN. Patient states pain level is tolerable at this time Will continue to monitor. Problem: HEMODYNAMIC STATUS  Goal: Patient has stable vital signs and fluid balance  Outcome: Ongoing  Neuro assessment completed, fall precautions in place, aspirations precautions in place, assess for barriers in communication and mobility, interventions to assist in communication and mobility in place, encouraged to call for assistance, adaptive devices used as needed, assess emotional state and support offered, encouraged patient to communicate by available means, and support systems included in patient care. Problem: Risk for Impaired Skin Integrity  Goal: Tissue integrity - skin and mucous membranes  Structural intactness and normal physiological function of skin and  mucous membranes.    Outcome: Ongoing      Problem: Falls - Risk of  Goal: Absence of falls  Outcome: Ongoing

## 2018-01-19 NOTE — PROGRESS NOTES
mg, 60 mg, Oral, 6 times per day  cyclobenzaprine (FLEXERIL) tablet 10 mg, 10 mg, Oral, TID PRN  polyethylene glycol (GLYCOLAX) packet 17 g, 17 g, Oral, Daily  ondansetron (ZOFRAN) injection 4 mg, 4 mg, Intravenous, Q4H PRN  nicotine (NICODERM CQ) 14 MG/24HR 1 patch, 1 patch, Transdermal, Daily  docusate sodium (COLACE) capsule 100 mg, 100 mg, Oral, Daily  magnesium hydroxide (MILK OF MAGNESIA) 400 MG/5ML suspension 30 mL, 30 mL, Oral, Daily PRN  acetaminophen (TYLENOL) tablet 650 mg, 650 mg, Oral, Q4H PRN  sodium chloride flush 0.9 % injection 10 mL, 10 mL, Intravenous, 2 times per day  sodium chloride flush 0.9 % injection 10 mL, 10 mL, Intravenous, PRN  acetaminophen (TYLENOL) tablet 650 mg, 650 mg, Oral, Q4H PRN  oxyCODONE (ROXICODONE) immediate release tablet 5 mg, 5 mg, Oral, Q4H PRN **OR** oxyCODONE (ROXICODONE) immediate release tablet 10 mg, 10 mg, Oral, Q4H PRN    ASSESSMENT AND PLAN  45 yo woman presenting with SAH 2/2 ruptured right MCA bifurcation aneurysm on 1/12/2018, HH 2, mF 1.    -->SAH care per neuro ICU  -->sbp to < 220mmHg  -->Goal normonatremia/normothermia/normovolemia.   -->IV magnesium drip with goal 2.5-4  -->Daily TCDs, R Lindegard ratio on 1/18: 6.2  -->Recommend LP with CSF drainage of up to 30 cc to decrease ICP and help with daily consistent headache. Please measure opening pressure.      Lianna Garcia MD  Neuroendovascular Fellow  Stroke, [facility]  [address]

## 2018-01-19 NOTE — PROGRESS NOTES
Physical Therapy  Facility/Department: [address]  Daily Treatment Note  NAME: Eliza Luna  : 1975  MRN: [id number]    Date of Service: 2018    Patient Diagnosis(es):   Patient Active Problem List    Diagnosis Date Noted    Cerebral salt-wasting     Generalized headaches     Ruptured cerebral aneurysm (HCC)     Ruptured middle cerebral artery aneurysm (HCC)     Aneurysmal subarachnoid hemorrhage ([address]     SAH (subarachnoid hemorrhage) ([address] 2018    Tobacco use 2017    Genital warts 2017    Migraine        Past Medical History:   Diagnosis Date    Caffeinism     [employer]    Generalized headaches     Low back pain     Migraine     Migraines stopped after birth of first daughter in [address] Patient in clinical research study 2018    TARGET: Consented 2018, Expected Completion Date 2018    Recurrent sinus infections 2017    Ruptured middle [facility] 2018    Right MCA    SAH ([facility]arachnoid hemorrhage) ([facility] 2018    Tobacco use      Past Surgical History:   Procedure Laterality Date    BRAIN ANEURYSM SURGERY  2018    coil ablation ; MCA     OTHER SURGICAL HISTORY  2013    Pelvic Ablasion    TUBAL LIGATION  2011       Restrictions  Restrictions/Precautions  Restrictions/Precautions: General Precautions  Required Braces or Orthoses?: No  Position Activity Restriction  Other position/activity restrictions: Up with assist  Subjective   General  Response To Previous Treatment: Patient with no complaints from previous session. Family / Caregiver Present: Yes  Subjective  Subjective: Pt resting in bed upon arrival in dark room due to c/o headache  Pain Screening  Patient Currently in Pain: Yes  Pain Assessment  Pain Assessment: 0-10  Pain Level: 6  Pain Type: Acute pain  Pain Location: Head  Pain Intervention(s): Repositioned; Ambulation/Increased activity  Response to Pain Intervention: Patient Satisfied  Vital Signs  Patient Currently in Pain: Yes       Orientation  Orientation  Overall Orientation Status: Within Normal Limits  Objective   Bed mobility  Supine to Sit: Independent  Sit to Supine: Independent  Scooting: Independent  Transfers  Sit to Stand: Independent  Stand to sit: Independent  Ambulation  Ambulation?: Yes  Ambulation 1  Surface: level tile  Device: No Device  Assistance: Supervision  Quality of Gait: Fast pace  Distance: 50 ft  Comments: Limited by need to use restroom     Balance  Posture: Good  Sitting - Static: Good  Sitting - Dynamic: Good  Standing - Static: Good  Standing - Dynamic: Good  Comments: standing balance assessed without AD                           Assessment   Body structures, Functions, Activity limitations: Decreased functional mobility ; Decreased endurance;Decreased strength;Decreased balance  Assessment: Treatment limited by headache.  Pt likely ok to return home with supportive significant other  Prognosis: Good  REQUIRES PT FOLLOW UP: Yes  Activity Tolerance  Activity Tolerance: Patient Tolerated treatment well;Patient limited by pain (headache)  PT Equipment Recommendations  Equipment Needed: No          Goals  Short term goals  Time Frame for Short term goals: 6 visits  Short term goal 1: Perform bed mobility and functional transfers independently  Short term goal 2: Ascend/descend 30 steps with 1 HR independently  Short term goal 3: Demo good dynamic standing balance without AD to decrease risk of falls  Patient Goals   Patient goals : return home    Plan    Plan  Times per week: 5-6x  Times per day: Daily  Current Treatment Recommendations: Strengthening, Balance Training, Functional Mobility Training, Transfer Training, Endurance Training, Gait Training, Stair training, Home Exercise Program, Safety Education & Training, Patient/Caregiver Education & Training  Safety Devices  Type of devices:  (Left in restroom with RN

## 2018-01-19 NOTE — PROGRESS NOTES
Nutrition Assessment    Type and Reason for Visit: Initial (LOS)    Nutrition Recommendations: Continue current General diet as tolerated. Will provide oral supplements as needed. Monitor constipation. Malnutrition Assessment:  · Malnutrition Status: At risk for malnutrition  · Context: Acute illness or injury  · Findings of the 6 clinical characteristics of malnutrition (Minimum of 2 out of 6 clinical characteristics is required to make the diagnosis of moderate or severe Protein Calorie Malnutrition based on AND/ASPEN Guidelines):  1. Energy Intake-51% to 75%, greater than 7 days    2. Weight Loss-No significant weight loss  3. Fat Loss-No significant subcutaneous fat loss  4. Muscle Loss-No significant muscle mass loss  5. Fluid Accumulation-No significant fluid accumulation    Nutrition Diagnosis:   · Problem: Inadequate oral intake  · Etiology: related to current condition/headache     Signs and symptoms:  as evidenced by variable PO intakes    Nutrition Assessment:  · Subjective Assessment: Pt seen based on length of stay. Admitted with c/o a headache and vomiting. Pt has been tolerating a General diet with improving intakes. Pt reports she usually does not eat much in the morning but eats better as the day goes on. Noted per nursing documentation pt taking % of meals over past few days. Pt continues to c/o a headache. Noted no bowel movement since 1/12. · Nutrition-Focused Physical Findings: +Hypoactive/active bowel sounds.   +Constipated  · Wound Type: None  · Current Nutrition Therapies:  · Oral Diet Orders: General   · Oral Diet intake: 51-75%, %  · Oral Nutrition Supplement (ONS) Orders: None  · Anthropometric Measures:  · Ht: 5' 2\" (157.5 cm)   · Current Body Wt: 136 lb (61.7 kg)  · Admission Body Wt: 136 lb (61.7 kg)  · Ideal Body Wt: 110 lb (49.9 kg), % Ideal Body 124%  · BMI Classification: BMI 18.5 - 24.9 Normal Weight  · Comparative Standards (Estimated Nutrition Needs):  · Estimated Daily Total Kcal: 5973-7008 kcal  · Estimated Daily Protein (g): 60-80 gm protein    Estimated Intake vs Estimated Needs: Intake Improving    Nutrition Risk Level: Moderate    Nutrition Interventions:   Continue current diet - Provide ONS as/if needed. Continued Inpatient Monitoring, Education Not Indicated    Nutrition Evaluation:   · Evaluation: Goals set   · Goals: Oral intakes to meet % of estimated nutrient needs. · Monitoring: Meal Intake, Diet Tolerance, Skin Integrity, Fluid Balance, Weight, Comparative Standards, Pertinent Labs    See Adult Nutrition Doc Flowsheet for more detail.      Electronically signed by Sho Garcia RD, PAULA on 1/19/18 at 11:47 AM    Contact Number: [phone]

## 2018-01-20 LAB
ABSOLUTE EOS #: 0.36 K/UL (ref 0–0.44)
ABSOLUTE IMMATURE GRANULOCYTE: 0.05 K/UL (ref 0–0.3)
ABSOLUTE LYMPH #: 2.36 K/UL (ref 1.1–3.7)
ABSOLUTE MONO #: 0.75 K/UL (ref 0.1–1.2)
ANION GAP SERPL CALCULATED.3IONS-SCNC: 12 MMOL/L (ref 9–17)
BASOPHILS # BLD: 0 % (ref 0–2)
BASOPHILS ABSOLUTE: <0.03 K/UL (ref 0–0.2)
BUN BLDV-MCNC: 5 MG/DL (ref 6–20)
BUN/CREAT BLD: ABNORMAL (ref 9–20)
CALCIUM IONIZED: 1.14 MMOL/L (ref 1.13–1.33)
CALCIUM SERPL-MCNC: 8.5 MG/DL (ref 8.6–10.4)
CHLORIDE BLD-SCNC: 105 MMOL/L (ref 98–107)
CO2: 19 MMOL/L (ref 20–31)
CREAT SERPL-MCNC: 0.47 MG/DL (ref 0.5–0.9)
DIFFERENTIAL TYPE: ABNORMAL
EOSINOPHILS RELATIVE PERCENT: 3 % (ref 1–4)
GFR AFRICAN AMERICAN: >60 ML/MIN
GFR NON-AFRICAN AMERICAN: >60 ML/MIN
GFR SERPL CREATININE-BSD FRML MDRD: ABNORMAL ML/MIN/{1.73_M2}
GFR SERPL CREATININE-BSD FRML MDRD: ABNORMAL ML/MIN/{1.73_M2}
GLUCOSE BLD-MCNC: 101 MG/DL (ref 70–99)
HCT VFR BLD CALC: 34 % (ref 36.3–47.1)
HEMOGLOBIN: 11.9 G/DL (ref 11.9–15.1)
IMMATURE GRANULOCYTES: 0 %
LYMPHOCYTES # BLD: 19 % (ref 24–43)
MAGNESIUM: 2.4 MG/DL (ref 1.6–2.6)
MAGNESIUM: 2.7 MG/DL (ref 1.6–2.6)
MAGNESIUM: 2.7 MG/DL (ref 1.6–2.6)
MAGNESIUM: 3.2 MG/DL (ref 1.6–2.6)
MCH RBC QN AUTO: 32.6 PG (ref 25.2–33.5)
MCHC RBC AUTO-ENTMCNC: 35 G/DL (ref 28.4–34.8)
MCV RBC AUTO: 93.2 FL (ref 82.6–102.9)
MONOCYTES # BLD: 6 % (ref 3–12)
NRBC AUTOMATED: 0 PER 100 WBC
PDW BLD-RTO: 13.2 % (ref 11.8–14.4)
PHOSPHORUS: 3.9 MG/DL (ref 2.6–4.5)
PLATELET # BLD: 291 K/UL (ref 138–453)
PLATELET ESTIMATE: ABNORMAL
PMV BLD AUTO: 8.6 FL (ref 8.1–13.5)
POTASSIUM SERPL-SCNC: 4.2 MMOL/L (ref 3.7–5.3)
RBC # BLD: 3.65 M/UL (ref 3.95–5.11)
RBC # BLD: ABNORMAL 10*6/UL
SEG NEUTROPHILS: 72 % (ref 36–65)
SEGMENTED NEUTROPHILS ABSOLUTE COUNT: 9.22 K/UL (ref 1.5–8.1)
SODIUM BLD-SCNC: 136 MMOL/L (ref 135–144)
WBC # BLD: 12.8 K/UL (ref 3.5–11.3)
WBC # BLD: ABNORMAL 10*3/UL

## 2018-01-20 PROCEDURE — 80048 BASIC METABOLIC PNL TOTAL CA: CPT

## 2018-01-20 PROCEDURE — 6360000002 HC RX W HCPCS: Performed by: EMERGENCY MEDICINE

## 2018-01-20 PROCEDURE — 83735 ASSAY OF MAGNESIUM: CPT

## 2018-01-20 PROCEDURE — 6360000002 HC RX W HCPCS: Performed by: PSYCHIATRY & NEUROLOGY

## 2018-01-20 PROCEDURE — 82330 ASSAY OF CALCIUM: CPT

## 2018-01-20 PROCEDURE — 6370000000 HC RX 637 (ALT 250 FOR IP): Performed by: NURSE PRACTITIONER

## 2018-01-20 PROCEDURE — 2580000003 HC RX 258: Performed by: EMERGENCY MEDICINE

## 2018-01-20 PROCEDURE — 6360000002 HC RX W HCPCS: Performed by: NURSE PRACTITIONER

## 2018-01-20 PROCEDURE — 6370000000 HC RX 637 (ALT 250 FOR IP): Performed by: NEUROLOGICAL SURGERY

## 2018-01-20 PROCEDURE — 6370000000 HC RX 637 (ALT 250 FOR IP): Performed by: PSYCHIATRY & NEUROLOGY

## 2018-01-20 PROCEDURE — 2000000003 HC NEURO ICU R&B

## 2018-01-20 PROCEDURE — 6360000002 HC RX W HCPCS: Performed by: NEUROLOGICAL SURGERY

## 2018-01-20 PROCEDURE — 85025 COMPLETE CBC W/AUTO DIFF WBC: CPT

## 2018-01-20 PROCEDURE — 99232 SBSQ HOSP IP/OBS MODERATE 35: CPT | Performed by: PSYCHIATRY & NEUROLOGY

## 2018-01-20 PROCEDURE — 6370000000 HC RX 637 (ALT 250 FOR IP): Performed by: EMERGENCY MEDICINE

## 2018-01-20 PROCEDURE — 2580000003 HC RX 258: Performed by: NURSE PRACTITIONER

## 2018-01-20 PROCEDURE — 36415 COLL VENOUS BLD VENIPUNCTURE: CPT

## 2018-01-20 PROCEDURE — 93886 INTRACRANIAL COMPLETE STUDY: CPT

## 2018-01-20 PROCEDURE — 94762 N-INVAS EAR/PLS OXIMTRY CONT: CPT

## 2018-01-20 PROCEDURE — 84100 ASSAY OF PHOSPHORUS: CPT

## 2018-01-20 RX ORDER — 0.9 % SODIUM CHLORIDE 0.9 %
1000 INTRAVENOUS SOLUTION INTRAVENOUS ONCE
Status: COMPLETED | OUTPATIENT
Start: 2018-01-20 | End: 2018-01-20

## 2018-01-20 RX ORDER — MAGNESIUM SULFATE 1 G/100ML
1 INJECTION INTRAVENOUS
Status: DISPENSED | OUTPATIENT
Start: 2018-01-20 | End: 2018-01-20

## 2018-01-20 RX ORDER — HYDRALAZINE HYDROCHLORIDE 20 MG/ML
5 INJECTION INTRAMUSCULAR; INTRAVENOUS EVERY 4 HOURS PRN
Status: DISCONTINUED | OUTPATIENT
Start: 2018-01-20 | End: 2018-01-26 | Stop reason: HOSPADM

## 2018-01-20 RX ORDER — MAGNESIUM SULFATE 1 G/100ML
1 INJECTION INTRAVENOUS
Status: DISCONTINUED | OUTPATIENT
Start: 2018-01-20 | End: 2018-01-20

## 2018-01-20 RX ADMIN — MYCOPHENOLATE MOFETIL 300 MG: 500 TABLET ORAL at 08:41

## 2018-01-20 RX ADMIN — MAGNESIUM SULFATE HEPTAHYDRATE 1 G: 1 INJECTION, SOLUTION INTRAVENOUS at 08:52

## 2018-01-20 RX ADMIN — SODIUM CHLORIDE 1000 ML: 9 INJECTION, SOLUTION INTRAVENOUS at 11:49

## 2018-01-20 RX ADMIN — MORPHINE SULFATE 2 MG: 2 INJECTION, SOLUTION INTRAMUSCULAR; INTRAVENOUS at 01:40

## 2018-01-20 RX ADMIN — NIMODIPINE 60 MG: 30 CAPSULE, LIQUID FILLED ORAL at 23:49

## 2018-01-20 RX ADMIN — MORPHINE SULFATE 2 MG: 2 INJECTION, SOLUTION INTRAMUSCULAR; INTRAVENOUS at 00:13

## 2018-01-20 RX ADMIN — HEPARIN SODIUM 5000 UNITS: 5000 INJECTION, SOLUTION INTRAVENOUS; SUBCUTANEOUS at 05:57

## 2018-01-20 RX ADMIN — MAGNESIUM SULFATE HEPTAHYDRATE 1 G: 1 INJECTION, SOLUTION INTRAVENOUS at 10:15

## 2018-01-20 RX ADMIN — SIMVASTATIN 40 MG: 40 TABLET, FILM COATED ORAL at 20:20

## 2018-01-20 RX ADMIN — MAGNESIUM SULFATE HEPTAHYDRATE 1 G: 1 INJECTION, SOLUTION INTRAVENOUS at 07:19

## 2018-01-20 RX ADMIN — NIMODIPINE 60 MG: 30 CAPSULE, LIQUID FILLED ORAL at 08:41

## 2018-01-20 RX ADMIN — FAMOTIDINE 20 MG: 20 TABLET, FILM COATED ORAL at 20:19

## 2018-01-20 RX ADMIN — NIMODIPINE 60 MG: 30 CAPSULE, LIQUID FILLED ORAL at 12:26

## 2018-01-20 RX ADMIN — OXYCODONE HYDROCHLORIDE 10 MG: 5 TABLET ORAL at 08:41

## 2018-01-20 RX ADMIN — MAGNESIUM SULFATE HEPTAHYDRATE 2 G: 500 INJECTION, SOLUTION INTRAMUSCULAR; INTRAVENOUS at 11:52

## 2018-01-20 RX ADMIN — MORPHINE SULFATE 2 MG: 2 INJECTION, SOLUTION INTRAMUSCULAR; INTRAVENOUS at 04:14

## 2018-01-20 RX ADMIN — NIMODIPINE 60 MG: 30 CAPSULE, LIQUID FILLED ORAL at 20:20

## 2018-01-20 RX ADMIN — MORPHINE SULFATE 2 MG: 2 INJECTION, SOLUTION INTRAMUSCULAR; INTRAVENOUS at 10:34

## 2018-01-20 RX ADMIN — HEPARIN SODIUM 5000 UNITS: 5000 INJECTION, SOLUTION INTRAVENOUS; SUBCUTANEOUS at 22:02

## 2018-01-20 RX ADMIN — Medication 10 ML: at 20:20

## 2018-01-20 RX ADMIN — MAGNESIUM SULFATE IN DEXTROSE 1 G: 10 INJECTION, SOLUTION INTRAVENOUS at 05:57

## 2018-01-20 RX ADMIN — MYCOPHENOLATE MOFETIL 300 MG: 500 TABLET ORAL at 20:19

## 2018-01-20 RX ADMIN — NIMODIPINE 60 MG: 30 CAPSULE, LIQUID FILLED ORAL at 16:03

## 2018-01-20 RX ADMIN — HEPARIN SODIUM 5000 UNITS: 5000 INJECTION, SOLUTION INTRAVENOUS; SUBCUTANEOUS at 14:03

## 2018-01-20 RX ADMIN — OXYCODONE HYDROCHLORIDE 10 MG: 5 TABLET ORAL at 17:53

## 2018-01-20 RX ADMIN — MYCOPHENOLATE MOFETIL 300 MG: 500 TABLET ORAL at 14:03

## 2018-01-20 RX ADMIN — NIMODIPINE 60 MG: 30 CAPSULE, LIQUID FILLED ORAL at 00:13

## 2018-01-20 RX ADMIN — DOCUSATE SODIUM 100 MG: 100 CAPSULE, LIQUID FILLED ORAL at 08:41

## 2018-01-20 RX ADMIN — MORPHINE SULFATE 2 MG: 2 INJECTION, SOLUTION INTRAMUSCULAR; INTRAVENOUS at 06:05

## 2018-01-20 RX ADMIN — MAGNESIUM SULFATE HEPTAHYDRATE 2 G: 500 INJECTION, SOLUTION INTRAMUSCULAR; INTRAVENOUS at 20:35

## 2018-01-20 RX ADMIN — NIMODIPINE 60 MG: 30 CAPSULE, LIQUID FILLED ORAL at 04:14

## 2018-01-20 RX ADMIN — POLYETHYLENE GLYCOL 3350 17 G: 17 POWDER, FOR SOLUTION ORAL at 08:42

## 2018-01-20 RX ADMIN — OXYCODONE HYDROCHLORIDE 10 MG: 5 TABLET ORAL at 12:26

## 2018-01-20 RX ADMIN — FAMOTIDINE 20 MG: 20 TABLET, FILM COATED ORAL at 08:41

## 2018-01-20 ASSESSMENT — PAIN SCALES - GENERAL
PAINLEVEL_OUTOF10: 5
PAINLEVEL_OUTOF10: 8
PAINLEVEL_OUTOF10: 6
PAINLEVEL_OUTOF10: 5
PAINLEVEL_OUTOF10: 7
PAINLEVEL_OUTOF10: 9
PAINLEVEL_OUTOF10: 10
PAINLEVEL_OUTOF10: 4

## 2018-01-20 NOTE — PROGRESS NOTES
Department of Endovascular Neurosurgery  Fellow Progress Note      SUBJECTIVE:    LP helped headache temporarily. OBJECTIVE    Physical  VITALS:  BP (!) 89/48   Pulse 57   Temp 97.3 °F (36.3 °C) (Oral)   Resp 16   Ht 5' 2\" (1.575 m)   Wt 136 lb (61.7 kg)   SpO2 94%   BMI 24.87 kg/m²      NEUROLOGIC:    Alert,awake, oriented to name, place and time. Cranial nerves II-XII are grossly intact. Motor is 5 out of 5 bilaterally. Sensory: intact to LT in 4 extremities.      Data  CBC:   Lab Results   Component Value Date    WBC 12.8 01/20/2018    RBC 3.65 01/20/2018    HGB 11.9 01/20/2018    HCT 34.0 01/20/2018    MCV 93.2 01/20/2018    MCH 32.6 01/20/2018    MCHC 35.0 01/20/2018    RDW 13.2 01/20/2018     01/20/2018    MPV 8.6 01/20/2018     CMP:    Lab Results   Component Value Date     01/20/2018    K 4.2 01/20/2018     01/20/2018    CO2 19 01/20/2018    BUN 5 01/20/2018    CREATININE 0.47 01/20/2018    GFRAA >60 01/20/2018    LABGLOM >60 01/20/2018    GLUCOSE 101 01/20/2018    CALCIUM 8.5 01/20/2018     Current Inpatient Medications  Current Facility-Administered Medications: sodium chloride flush 0.9 % injection 10 mL, 10 mL, Intravenous, 2 times per day  sodium chloride flush 0.9 % injection 10 mL, 10 mL, Intravenous, PRN  morphine (PF) injection 4 mg, 4 mg, Intravenous, Once  morphine injection 2 mg, 2 mg, Intravenous, Q1H PRN  gabapentin (NEURONTIN) capsule 300 mg, 300 mg, Oral, TID  lidocaine 1 % injection 5 mL, 5 mL, Intradermal, Once  simvastatin (ZOCOR) tablet 40 mg, 40 mg, Oral, Nightly  heparin (porcine) injection 5,000 Units, 5,000 Units, Subcutaneous, 3 times per day  lactulose (CHRONULAC) 10 GM/15ML solution 30 g, 30 g, Oral, BID  famotidine (PEPCID) tablet 20 mg, 20 mg, Oral, BID  0.9 % sodium chloride infusion, , Intravenous, Continuous  magnesium sulfate 1 g in dextrose 5% 100 mL IVPB, 1 g, Intravenous, PRN  niMODipine (NIMOTOP) capsule 60 mg, 60 mg, Oral, 6 times per day  cyclobenzaprine (FLEXERIL) tablet 10 mg, 10 mg, Oral, TID PRN  polyethylene glycol (GLYCOLAX) packet 17 g, 17 g, Oral, Daily  ondansetron (ZOFRAN) injection 4 mg, 4 mg, Intravenous, Q4H PRN  nicotine (NICODERM CQ) 14 MG/24HR 1 patch, 1 patch, Transdermal, Daily  docusate sodium (COLACE) capsule 100 mg, 100 mg, Oral, Daily  magnesium hydroxide (MILK OF MAGNESIA) 400 MG/5ML suspension 30 mL, 30 mL, Oral, Daily PRN  acetaminophen (TYLENOL) tablet 650 mg, 650 mg, Oral, Q4H PRN  oxyCODONE (ROXICODONE) immediate release tablet 5 mg, 5 mg, Oral, Q4H PRN **OR** oxyCODONE (ROXICODONE) immediate release tablet 10 mg, 10 mg, Oral, Q4H PRN    ASSESSMENT AND PLAN  43 yo woman presenting with SAH 2/2 ruptured right MCA bifurcation aneurysm on 1/12/2018, HH 2, mF 1.    -->SAH care per neuro ICU  -->sbp to < 220mmHg  -->Goal normonatremia/normothermia/normovolemia.   -->Magnesium goal 2.5-4  -->Daily TCDs  -->Opening pressure on LP 1/19/18 was 35, 24 cc of CSF was drained, closing pressure 13. Will consider repeating LP tomorrow. Discussed with Dr Maty Gaviria.   Lalito Finn MD  Neuroendovascular Fellow  Stroke, [facility]  [address]

## 2018-01-20 NOTE — PROGRESS NOTES
all commands. Moves all extremities equally without weakness. Denies vision changes, numbness/tingling, SOB, chest pain.       CURRENT MEDICATIONS:  SCHEDULED MEDICATIONS:   magnesium sulfate  2 g Intravenous Once    sodium chloride flush  10 mL Intravenous 2 times per day    morphine  4 mg Intravenous Once    gabapentin  300 mg Oral TID    lidocaine 1 % injection  5 mL Intradermal Once    simvastatin  40 mg Oral Nightly    heparin (porcine)  5,000 Units Subcutaneous 3 times per day    lactulose  30 g Oral BID    famotidine  20 mg Oral BID    niMODipine  60 mg Oral 6 times per day    polyethylene glycol  17 g Oral Daily    nicotine  1 patch Transdermal Daily    docusate sodium  100 mg Oral Daily     CONTINUOUS INFUSIONS:   sodium chloride 150 mL/hr at 18 0721     PRN MEDICATIONS:   sodium chloride flush, morphine, magnesium sulfate, cyclobenzaprine, ondansetron, magnesium hydroxide, acetaminophen, oxyCODONE **OR** oxyCODONE    VITALS:  Temperature Range: Temp: 98.2 °F (36.8 °C) Temp  Av.1 °F (36.7 °C)  Min: 97.3 °F (36.3 °C)  Max: 98.4 °F (36.9 °C)  BP Range: Systolic (60CZX), XRD:665 , Min:89 , NKH:607     Diastolic (83HYW), NFD:64, Min:48, Max:84    Pulse Range: Pulse  Av.6  Min: 54  Max: 69  Respiration Range: Resp  Av.2  Min: 12  Max: 21  Current Pulse Ox: SpO2: 97 %  24HR Pulse Ox Range: SpO2  Av.6 %  Min: 92 %  Max: 99 %  Patient Vitals for the past 12 hrs:   BP Temp Temp src Pulse Resp SpO2   18 1503 114/61 - - 55 17 97 %   18 1317 123/67 - - 62 17 96 %   18 1235 - 98.2 °F (36.8 °C) Oral 63 21 99 %   18 1214 (!) 103/52 - - 54 13 98 %   18 1203 127/79 - - 69 12 97 %   18 1103 120/71 - - 59 16 92 %   18 1003 (!) 103/55 - - 61 15 96 %   18 0903 107/67 - - 54 16 94 %   18 0840 (!) 89/48 97.3 °F (36.3 °C) Oral 57 16 94 %   18 0448 - - - - - 98 %   18 0433 - - - - - 99 %     Estimated body mass index is 24.87 within the sulci of the right frontotemporal lobes. No new intracranial hemorrhage identified. Gray-white differentiation is intact. There is no significant mass effect. No midline shift. No hydrocephalus. No mass effect on the basal cisterns. ORBITS: The visualized portion of the orbits demonstrate no acute abnormality. SINUSES: Trace nonspecific bilateral mastoid effusions. Mild ethmoid and maxillary sinus disease. SOFT TISSUES/SKULL:  No acute abnormality of the visualized skull or soft tissues. Overall improvement compared to the prior study with persistent minimal/trace subarachnoid hemorrhage within the sulci of the right frontotemporal lobes. The subarachnoid hemorrhage within the basal cisterns and possible subdural hemorrhage overlying the right temporal lobe have resolved. No new acute intracranial hemorrhage. Ct Head Wo Contrast    Result Date: 1/13/2018  EXAMINATION: CT OF THE HEAD WITHOUT CONTRAST  1/13/2018 5:42 am TECHNIQUE: CT of the head was performed without the administration of intravenous contrast. Dose modulation, iterative reconstruction, and/or weight based adjustment of the mA/kV was utilized to reduce the radiation dose to as low as reasonably achievable. COMPARISON: 01/12/2018, 1156 hours HISTORY: ORDERING SYSTEM PROVIDED HISTORY: post angio, severe HA TECHNOLOGIST PROVIDED HISTORY: Has a \"code stroke\" or \"stroke alert\" been called? ->No 69-year-old female with severe headache status post angio FINDINGS: BRAIN/VENTRICLES: Foramen magnum and 4th ventricle appear patent. Ventricles are normal in size and midline in position. There is stable subarachnoid hemorrhage within the basal cisterns, right greater than left on image 21, series 2. There may be a component of subdural hemorrhage along the anterior aspect of the right temporal lobe on image 21, series 2.   Diffuse subarachnoid blood is seen throughout the sulci of the right cerebral hemisphere and right insula, grossly and distal branches. The left A1 is dominant and supplies blood flow, via the ACOM artery, to the  right LISE territory. Inspection of the remaining left internal carotid artery circulation revealed no other evidence of cerebral aneurysm, arteriovenous malformation, or arterial stenosis. Capillary and venous phase images were also unremarkable, with no evidence of veno-occlusive disease. Left VA technique: The left subclavian artery was then selectively catheterized and the left vertebral artery was selectively catheterized with roadmap guidance. Digital subtraction angiography of the intracranial left vertebrobasilar run-off was obtained in frontal and lateral projections. Interpretation: The left vertebral artery injection demonstrates normal antegrade opacification of the left vertebral artery, including the cervical segment, basilar artery, and respective branches. There was no evidence of cerebral aneurysm, arteriovenous malformation, or arterial stenosis. Capillary and venous phase images were unremarkable. There is an anastomosis between the left vertebral artery and the left occipital artery through the posterior radicular branches at the C1 level. The  right PCA is absent, confirming the presence of a right fetal PCA. Retrograde contrast opacification of the contralateral right V4 segment was achieved and demonstrated no evidence of an aneurysm at the origin of the right PICA. Following removal of all catheters, digital subtraction angiography of the right common femoral artery was performed in a frontal oblique projection via injection of contrast through the intravascular sheath. Arterial phase images were unremarkable, with  no evidence of arterial stenosis, dissection, pseudoaneurysm, or arteriovenous fistula. The femoral puncture site was placed near the femoral bifurcation, but vessel size appeared suitable for the use of a closure device. A 6F Vascade was used to achieve hemostasis at the groin. Vertebral:  BASILAR: 60.8                        28.5  Patient Status: In Patient.   Conclusions   Summary   DOS: 1/16/2018   Complete Arterial TCD Showing:   Diffuse mild to moderate vasospasm, highest velocities at the right MCA  with Max MCBFVs of 140 cm/sec with Lindegaard ratio of 3.91, improved from  the prior study on 1/15/2018   Recommendations   Clinical Correlation is Recommended as Per the Primary Team  Follow up TCD as per the Primary Team   Signature   ----------------------------------------------------------------  Electronically signed by Sita Winters(Sonographer) on  01/16/2018 12:21 PM  ----------------------------------------------------------------   ----------------------------------------------------------------  Electronically signed by Twila Roman(Interpreting physician)  on 01/16/2018 07:19 PM  ----------------------------------------------------------------      Vl Transcranial Doppler Complete    Result Date: 1/16/2018    [facility]  Vascular Transcranial Procedure   Patient Name    Ermelinda Junior         Date of Study             01/15/2018                  [place]   Date of Birth   1975    Gender                    Female   Age             44 year(s)    Race                         Room Number     5929   Corporate ID #  [id number]   Patient Acct #  [de-identified]   MR #            [id number]       Bard Lockwood   Accession #     [id number]     Interpreting Physician    Rodríguez Wright   Referring Nurse Cecilia Cooper        Referring Physician  Practitioner    Gabriel Aguilar CNP  Additional Comments CLASSIFICATION OF VASOSPASM MEAN MCA VELOCITY ----- MCA/ICA VELOCITY RATIO ------- INTERPRETATION <120 cm/sec --------------------- less than 3 --------------------------- Normal, nonspecific elevation or distal MCA spasm >120 cm/sec ------------------------ 3 to 6 -------------------------------- Mild vasospasm of proximal MCA >160 cm/sec ------------------------ 3 Transforamenal Approach               Vertebral: 48.5  Vertebral: 32.7  Patient Status: In Patient. Velocities are measured in cm/s ; Diameters are measured in mm Right Transcranial Duplex Measurements Right Transforamenal Approach +-----------+-------+-------+--------+-------+-------+--------+------------+ ! Location   ! PSV    ! EDV    ! Vmean   ! RI     ! PI     ! Depth   ! Direction   ! +-----------+-------+-------+--------+-------+-------+--------+------------+ ! Basilar    !97.8   !40.8   !59.8    !0.58   !0.82   !        !            ! +-----------+-------+-------+--------+-------+-------+--------+------------+ Left Transcranial Duplex Measurements Left Transforamenal Approach +-----------+-------+-------+--------+-------+-------+--------+------------+ ! Location   ! PSV    ! EDV    ! Vmean   ! RI     ! PI     ! Depth   ! Direction   ! +-----------+-------+-------+--------+-------+-------+--------+------------+ ! Basilar    !97.8   !40.8   !59.8    !0.58   !0.82   !        !            ! +-----------+-------+-------+--------+-------+-------+--------+------------+  Conclusions   Summary   DOS: 1/15/2018  This is a complete TCD showing:   Diffuse mild to moderate cerebral vasospasm (Worse on the right MCA with  Max MCBFVs of 119) with right Lindegaard ratio of 3.65   Recommendations   Clinical correlation is recommended as per the primary team  Follow up TCD as per the primary team   Signature   ----------------------------------------------------------------  Electronically signed by Berna Benjamin(Sonographer) on  01/15/2018 03:24 PM  ----------------------------------------------------------------   ----------------------------------------------------------------  Electronically signed by Twila Roman(Interpreting physician)  on 01/16/2018 07:01 PM  ----------------------------------------------------------------      Labs and Images reviewed with:  [] Eliza Veras  [x] Antoinette Gillespie MD      [] [facility]  [] Ankit Roper-

## 2018-01-21 LAB
ABSOLUTE EOS #: 0.49 K/UL (ref 0–0.44)
ABSOLUTE IMMATURE GRANULOCYTE: 0.03 K/UL (ref 0–0.3)
ABSOLUTE LYMPH #: 2.79 K/UL (ref 1.1–3.7)
ABSOLUTE MONO #: 0.96 K/UL (ref 0.1–1.2)
ANION GAP SERPL CALCULATED.3IONS-SCNC: 12 MMOL/L (ref 9–17)
BASOPHILS # BLD: 0 % (ref 0–2)
BASOPHILS ABSOLUTE: 0.03 K/UL (ref 0–0.2)
BUN BLDV-MCNC: 5 MG/DL (ref 6–20)
BUN/CREAT BLD: ABNORMAL (ref 9–20)
CALCIUM IONIZED: 1.13 MMOL/L (ref 1.13–1.33)
CALCIUM SERPL-MCNC: 8.6 MG/DL (ref 8.6–10.4)
CHLORIDE BLD-SCNC: 105 MMOL/L (ref 98–107)
CO2: 20 MMOL/L (ref 20–31)
CREAT SERPL-MCNC: 0.54 MG/DL (ref 0.5–0.9)
DIFFERENTIAL TYPE: ABNORMAL
EOSINOPHILS RELATIVE PERCENT: 5 % (ref 1–4)
GFR AFRICAN AMERICAN: >60 ML/MIN
GFR NON-AFRICAN AMERICAN: >60 ML/MIN
GFR SERPL CREATININE-BSD FRML MDRD: ABNORMAL ML/MIN/{1.73_M2}
GFR SERPL CREATININE-BSD FRML MDRD: ABNORMAL ML/MIN/{1.73_M2}
GLUCOSE BLD-MCNC: 92 MG/DL (ref 70–99)
HCT VFR BLD CALC: 35.9 % (ref 36.3–47.1)
HEMOGLOBIN: 12.3 G/DL (ref 11.9–15.1)
IMMATURE GRANULOCYTES: 0 %
LYMPHOCYTES # BLD: 28 % (ref 24–43)
MAGNESIUM: 2.1 MG/DL (ref 1.6–2.6)
MAGNESIUM: 2.3 MG/DL (ref 1.6–2.6)
MAGNESIUM: 2.6 MG/DL (ref 1.6–2.6)
MAGNESIUM: 3 MG/DL (ref 1.6–2.6)
MCH RBC QN AUTO: 32.3 PG (ref 25.2–33.5)
MCHC RBC AUTO-ENTMCNC: 34.3 G/DL (ref 28.4–34.8)
MCV RBC AUTO: 94.2 FL (ref 82.6–102.9)
MONOCYTES # BLD: 10 % (ref 3–12)
NRBC AUTOMATED: 0 PER 100 WBC
PDW BLD-RTO: 13.3 % (ref 11.8–14.4)
PHOSPHORUS: 4.1 MG/DL (ref 2.6–4.5)
PLATELET # BLD: 283 K/UL (ref 138–453)
PLATELET ESTIMATE: ABNORMAL
PMV BLD AUTO: 9 FL (ref 8.1–13.5)
POTASSIUM SERPL-SCNC: 4.6 MMOL/L (ref 3.7–5.3)
RBC # BLD: 3.81 M/UL (ref 3.95–5.11)
RBC # BLD: ABNORMAL 10*6/UL
SEG NEUTROPHILS: 57 % (ref 36–65)
SEGMENTED NEUTROPHILS ABSOLUTE COUNT: 5.85 K/UL (ref 1.5–8.1)
SODIUM BLD-SCNC: 137 MMOL/L (ref 135–144)
WBC # BLD: 10.2 K/UL (ref 3.5–11.3)
WBC # BLD: ABNORMAL 10*3/UL

## 2018-01-21 PROCEDURE — 6370000000 HC RX 637 (ALT 250 FOR IP): Performed by: NURSE PRACTITIONER

## 2018-01-21 PROCEDURE — 36415 COLL VENOUS BLD VENIPUNCTURE: CPT

## 2018-01-21 PROCEDURE — 93886 INTRACRANIAL COMPLETE STUDY: CPT

## 2018-01-21 PROCEDURE — 2000000003 HC NEURO ICU R&B

## 2018-01-21 PROCEDURE — 6370000000 HC RX 637 (ALT 250 FOR IP): Performed by: PSYCHIATRY & NEUROLOGY

## 2018-01-21 PROCEDURE — 6370000000 HC RX 637 (ALT 250 FOR IP): Performed by: EMERGENCY MEDICINE

## 2018-01-21 PROCEDURE — 6360000002 HC RX W HCPCS: Performed by: PSYCHIATRY & NEUROLOGY

## 2018-01-21 PROCEDURE — 99232 SBSQ HOSP IP/OBS MODERATE 35: CPT | Performed by: PSYCHIATRY & NEUROLOGY

## 2018-01-21 PROCEDURE — 6370000000 HC RX 637 (ALT 250 FOR IP): Performed by: NEUROLOGICAL SURGERY

## 2018-01-21 PROCEDURE — 6360000002 HC RX W HCPCS: Performed by: NEUROLOGICAL SURGERY

## 2018-01-21 PROCEDURE — 2580000003 HC RX 258: Performed by: EMERGENCY MEDICINE

## 2018-01-21 PROCEDURE — 85025 COMPLETE CBC W/AUTO DIFF WBC: CPT

## 2018-01-21 PROCEDURE — 80048 BASIC METABOLIC PNL TOTAL CA: CPT

## 2018-01-21 PROCEDURE — 2580000003 HC RX 258: Performed by: PSYCHIATRY & NEUROLOGY

## 2018-01-21 PROCEDURE — 83735 ASSAY OF MAGNESIUM: CPT

## 2018-01-21 PROCEDURE — 94762 N-INVAS EAR/PLS OXIMTRY CONT: CPT

## 2018-01-21 PROCEDURE — 84100 ASSAY OF PHOSPHORUS: CPT

## 2018-01-21 PROCEDURE — 82330 ASSAY OF CALCIUM: CPT

## 2018-01-21 RX ORDER — IBUPROFEN 400 MG/1
800 TABLET ORAL ONCE
Status: COMPLETED | OUTPATIENT
Start: 2018-01-21 | End: 2018-01-21

## 2018-01-21 RX ORDER — 0.9 % SODIUM CHLORIDE 0.9 %
1000 INTRAVENOUS SOLUTION INTRAVENOUS ONCE
Status: COMPLETED | OUTPATIENT
Start: 2018-01-21 | End: 2018-01-21

## 2018-01-21 RX ORDER — MAGNESIUM SULFATE 1 G/100ML
2 INJECTION INTRAVENOUS ONCE
Status: COMPLETED | OUTPATIENT
Start: 2018-01-21 | End: 2018-01-21

## 2018-01-21 RX ADMIN — NIMODIPINE 60 MG: 30 CAPSULE, LIQUID FILLED ORAL at 15:50

## 2018-01-21 RX ADMIN — NIMODIPINE 60 MG: 30 CAPSULE, LIQUID FILLED ORAL at 08:42

## 2018-01-21 RX ADMIN — CYCLOBENZAPRINE HYDROCHLORIDE 10 MG: 5 TABLET, FILM COATED ORAL at 11:03

## 2018-01-21 RX ADMIN — MAGNESIUM SULFATE HEPTAHYDRATE 1 G: 1 INJECTION, SOLUTION INTRAVENOUS at 11:08

## 2018-01-21 RX ADMIN — MYCOPHENOLATE MOFETIL 300 MG: 500 TABLET ORAL at 08:42

## 2018-01-21 RX ADMIN — NIMODIPINE 60 MG: 30 CAPSULE, LIQUID FILLED ORAL at 19:29

## 2018-01-21 RX ADMIN — POLYETHYLENE GLYCOL 3350 17 G: 17 POWDER, FOR SOLUTION ORAL at 08:42

## 2018-01-21 RX ADMIN — Medication 10 ML: at 08:51

## 2018-01-21 RX ADMIN — MAGNESIUM SULFATE HEPTAHYDRATE 1 G: 1 INJECTION, SOLUTION INTRAVENOUS at 15:50

## 2018-01-21 RX ADMIN — MORPHINE SULFATE 2 MG: 2 INJECTION, SOLUTION INTRAMUSCULAR; INTRAVENOUS at 15:46

## 2018-01-21 RX ADMIN — MAGNESIUM SULFATE HEPTAHYDRATE 2 G: 1 INJECTION, SOLUTION INTRAVENOUS at 20:16

## 2018-01-21 RX ADMIN — OXYCODONE HYDROCHLORIDE 5 MG: 5 TABLET ORAL at 01:10

## 2018-01-21 RX ADMIN — MAGNESIUM SULFATE HEPTAHYDRATE 1 G: 1 INJECTION, SOLUTION INTRAVENOUS at 13:18

## 2018-01-21 RX ADMIN — SIMVASTATIN 40 MG: 40 TABLET, FILM COATED ORAL at 19:29

## 2018-01-21 RX ADMIN — FAMOTIDINE 20 MG: 20 TABLET, FILM COATED ORAL at 19:29

## 2018-01-21 RX ADMIN — HEPARIN SODIUM 5000 UNITS: 5000 INJECTION, SOLUTION INTRAVENOUS; SUBCUTANEOUS at 20:16

## 2018-01-21 RX ADMIN — HEPARIN SODIUM 5000 UNITS: 5000 INJECTION, SOLUTION INTRAVENOUS; SUBCUTANEOUS at 06:30

## 2018-01-21 RX ADMIN — MORPHINE SULFATE 2 MG: 2 INJECTION, SOLUTION INTRAMUSCULAR; INTRAVENOUS at 18:09

## 2018-01-21 RX ADMIN — HEPARIN SODIUM 5000 UNITS: 5000 INJECTION, SOLUTION INTRAVENOUS; SUBCUTANEOUS at 13:18

## 2018-01-21 RX ADMIN — DOCUSATE SODIUM 100 MG: 100 CAPSULE, LIQUID FILLED ORAL at 08:41

## 2018-01-21 RX ADMIN — OXYCODONE HYDROCHLORIDE 10 MG: 5 TABLET ORAL at 19:29

## 2018-01-21 RX ADMIN — SODIUM CHLORIDE: 9 INJECTION, SOLUTION INTRAVENOUS at 10:53

## 2018-01-21 RX ADMIN — MORPHINE SULFATE 2 MG: 2 INJECTION, SOLUTION INTRAMUSCULAR; INTRAVENOUS at 16:52

## 2018-01-21 RX ADMIN — MAGNESIUM SULFATE HEPTAHYDRATE 1 G: 1 INJECTION, SOLUTION INTRAVENOUS at 12:15

## 2018-01-21 RX ADMIN — MYCOPHENOLATE MOFETIL 300 MG: 500 TABLET ORAL at 13:18

## 2018-01-21 RX ADMIN — NIMODIPINE 60 MG: 30 CAPSULE, LIQUID FILLED ORAL at 23:31

## 2018-01-21 RX ADMIN — FAMOTIDINE 20 MG: 20 TABLET, FILM COATED ORAL at 08:42

## 2018-01-21 RX ADMIN — SODIUM CHLORIDE 1000 ML: 9 INJECTION, SOLUTION INTRAVENOUS at 13:13

## 2018-01-21 RX ADMIN — MYCOPHENOLATE MOFETIL 300 MG: 500 TABLET ORAL at 20:25

## 2018-01-21 RX ADMIN — NIMODIPINE 60 MG: 30 CAPSULE, LIQUID FILLED ORAL at 04:43

## 2018-01-21 RX ADMIN — IBUPROFEN 800 MG: 400 TABLET ORAL at 18:21

## 2018-01-21 RX ADMIN — NIMODIPINE 60 MG: 30 CAPSULE, LIQUID FILLED ORAL at 12:15

## 2018-01-21 ASSESSMENT — PAIN DESCRIPTION - PAIN TYPE: TYPE: ACUTE PAIN

## 2018-01-21 ASSESSMENT — PAIN SCALES - GENERAL
PAINLEVEL_OUTOF10: 8
PAINLEVEL_OUTOF10: 8
PAINLEVEL_OUTOF10: 6
PAINLEVEL_OUTOF10: 7
PAINLEVEL_OUTOF10: 5
PAINLEVEL_OUTOF10: 6
PAINLEVEL_OUTOF10: 4

## 2018-01-21 ASSESSMENT — PAIN DESCRIPTION - ORIENTATION: ORIENTATION: OTHER (COMMENT)

## 2018-01-21 ASSESSMENT — PAIN DESCRIPTION - LOCATION: LOCATION: HEAD

## 2018-01-21 NOTE — PROGRESS NOTES
Neuro Critical Care Daily Progress Note    Patient Name: Feliciano Flores  Patient : 1975  Room/Bed: 2678/0027-82  Allergies: Allergies   Allergen Reactions    Chantix [Varenicline]      vomiting    Prozac [Fluoxetine Hcl]      cutting       Admission History:  Patient is a 44year old  lady who was admitted after headaches and was found to have [address] R MCA bifurcation aneurysm rupture. Hospital Course:  Patient underwent primary coil-embolization of the R MCA aneurysm. Continues with headaches overnight and elevated MCA velocities on the R side. Current Medications:  Scheduled Meds:   sodium chloride flush  10 mL Intravenous 2 times per day    morphine  4 mg Intravenous Once    gabapentin  300 mg Oral TID    lidocaine 1 % injection  5 mL Intradermal Once    simvastatin  40 mg Oral Nightly    heparin (porcine)  5,000 Units Subcutaneous 3 times per day    lactulose  30 g Oral BID    famotidine  20 mg Oral BID    niMODipine  60 mg Oral 6 times per day    polyethylene glycol  17 g Oral Daily    nicotine  1 patch Transdermal Daily    docusate sodium  100 mg Oral Daily     Continuous Infusions:   sodium chloride 150 mL/hr at 18 1053       Vitals:  Patient Vitals for the past 8 hrs:   BP Temp Temp src Pulse Resp SpO2   18 1703 125/66 - - 66 14 99 %   18 1603 100/64 99.1 °F (37.3 °C) Oral 62 18 98 %   18 1503 116/83 - - 69 23 99 %   18 1303 (!) 128/90 - - 62 22 98 %   18 1203 125/75 99.1 °F (37.3 °C) Oral 58 18 100 %     Height and Weight  Height: 5' 2\" (157.5 cm)  Weight: 136 lb (61.7 kg)  Weight Method: Stated  BSA (Calculated - sq m): 1.64 sq meters  BMI (Calculated): 24.9  Body mass index is 24.87 kg/m². <16 Severe malnutrition  1616.99 Moderate malnutrition  1718.49 Mild malnutrition  18.524.9 Normal  2529.9 Overweight (not obese)  3034.9 Obese class 1 (Low Risk)  3539.9 Obese class 2 (Moderate Risk)  ? 40 Obese class 3 (High Risk)    NEURO: Alert and oriented, speech is clear, no unilateral weakness or numbness. GI: Soft, non-tender. EXT: No edema. Assessment/Plan:  44year old with history of smoking presenting with severe headache and found to have [address] with a R MCA bifurcation cerebral aneurysm.     - H&H 2 and modified crum scale 3.   - SBP goal is up to 200 mmHg. - Nimodipine.  - Keppra stopped at day 3. No history of seizures reported. - Pain control.  - Daily TCDs---elevated in the R MCA.   - Close neuro monitoring for signs of vasospasm.  - Continue matching I/Os and avoiding hypovolemia.   - Mg goal is close to 3-4.5.   - Overall patient is doing well neurologically. - Will continue close monitoring in neuroICU.         Adri Araya MD  Neuro Critical Care Team  Pager [phone]  1/21/2018  5:41 PM

## 2018-01-22 LAB
ABSOLUTE EOS #: 0.6 K/UL (ref 0–0.44)
ABSOLUTE IMMATURE GRANULOCYTE: 0.04 K/UL (ref 0–0.3)
ABSOLUTE LYMPH #: 2.98 K/UL (ref 1.1–3.7)
ABSOLUTE MONO #: 1.16 K/UL (ref 0.1–1.2)
ANION GAP SERPL CALCULATED.3IONS-SCNC: 13 MMOL/L (ref 9–17)
BANDS, CSF: NORMAL %
BASO CSF: NORMAL %
BASOPHILS # BLD: 0 % (ref 0–2)
BASOPHILS ABSOLUTE: 0.04 K/UL (ref 0–0.2)
BLAST CSF: NORMAL %
BUN BLDV-MCNC: 7 MG/DL (ref 6–20)
BUN/CREAT BLD: ABNORMAL (ref 9–20)
CALCIUM IONIZED: 1.15 MMOL/L (ref 1.13–1.33)
CALCIUM SERPL-MCNC: 8.4 MG/DL (ref 8.6–10.4)
CHLORIDE BLD-SCNC: 108 MMOL/L (ref 98–107)
CO2: 19 MMOL/L (ref 20–31)
CREAT SERPL-MCNC: 0.5 MG/DL (ref 0.5–0.9)
CULTURE: ABNORMAL
CULTURE: ABNORMAL
DIFFERENTIAL TYPE: ABNORMAL
DIRECT EXAM: ABNORMAL
EOS CSF: NORMAL %
EOSINOPHILS RELATIVE PERCENT: 5 % (ref 1–4)
FLUID DIFF COMMENT: NORMAL
GFR AFRICAN AMERICAN: >60 ML/MIN
GFR NON-AFRICAN AMERICAN: >60 ML/MIN
GFR SERPL CREATININE-BSD FRML MDRD: ABNORMAL ML/MIN/{1.73_M2}
GFR SERPL CREATININE-BSD FRML MDRD: ABNORMAL ML/MIN/{1.73_M2}
GLUCOSE BLD-MCNC: 91 MG/DL (ref 70–99)
HCT VFR BLD CALC: 36 % (ref 36.3–47.1)
HEMOGLOBIN: 12.1 G/DL (ref 11.9–15.1)
IMMATURE GRANULOCYTES: 0 %
LYMPHOCYTES # BLD: 25 % (ref 24–43)
LYMPHS CSF: 5 %
Lab: ABNORMAL
MAGNESIUM: 1.6 MG/DL (ref 1.6–2.6)
MAGNESIUM: 2 MG/DL (ref 1.6–2.6)
MAGNESIUM: 2.6 MG/DL (ref 1.6–2.6)
MAGNESIUM: 2.8 MG/DL (ref 1.6–2.6)
MCH RBC QN AUTO: 31.9 PG (ref 25.2–33.5)
MCHC RBC AUTO-ENTMCNC: 33.6 G/DL (ref 28.4–34.8)
MCV RBC AUTO: 95 FL (ref 82.6–102.9)
METAYELO CSF: NORMAL %
MONO/MACROPHAGE CSF (MANUAL): NORMAL %
MONOCYTES # BLD: 10 % (ref 3–12)
MYELOCYTE CSF: NORMAL %
NEUTROPHILS, CSF: 79 %
NRBC AUTOMATED: 0 PER 100 WBC
OTHER CELLS FLUID: NORMAL %
PDW BLD-RTO: 13.4 % (ref 11.8–14.4)
PHOSPHORUS: 4.2 MG/DL (ref 2.6–4.5)
PLATELET # BLD: 274 K/UL (ref 138–453)
PLATELET ESTIMATE: ABNORMAL
PMV BLD AUTO: 8.6 FL (ref 8.1–13.5)
POTASSIUM SERPL-SCNC: 3.7 MMOL/L (ref 3.7–5.3)
RBC # BLD: 3.79 M/UL (ref 3.95–5.11)
RBC # BLD: ABNORMAL 10*6/UL
SEG NEUTROPHILS: 60 % (ref 36–65)
SEGMENTED NEUTROPHILS ABSOLUTE COUNT: 7.1 K/UL (ref 1.5–8.1)
SODIUM BLD-SCNC: 140 MMOL/L (ref 135–144)
SPECIMEN DESCRIPTION: ABNORMAL
STATUS: ABNORMAL
WBC # BLD: 11.9 K/UL (ref 3.5–11.3)
WBC # BLD: ABNORMAL 10*3/UL

## 2018-01-22 PROCEDURE — 83735 ASSAY OF MAGNESIUM: CPT

## 2018-01-22 PROCEDURE — 99233 SBSQ HOSP IP/OBS HIGH 50: CPT | Performed by: PSYCHIATRY & NEUROLOGY

## 2018-01-22 PROCEDURE — 94762 N-INVAS EAR/PLS OXIMTRY CONT: CPT

## 2018-01-22 PROCEDURE — 85025 COMPLETE CBC W/AUTO DIFF WBC: CPT

## 2018-01-22 PROCEDURE — 84100 ASSAY OF PHOSPHORUS: CPT

## 2018-01-22 PROCEDURE — 6370000000 HC RX 637 (ALT 250 FOR IP): Performed by: EMERGENCY MEDICINE

## 2018-01-22 PROCEDURE — 97530 THERAPEUTIC ACTIVITIES: CPT

## 2018-01-22 PROCEDURE — 99232 SBSQ HOSP IP/OBS MODERATE 35: CPT | Performed by: PSYCHIATRY & NEUROLOGY

## 2018-01-22 PROCEDURE — 6370000000 HC RX 637 (ALT 250 FOR IP): Performed by: PSYCHIATRY & NEUROLOGY

## 2018-01-22 PROCEDURE — 80048 BASIC METABOLIC PNL TOTAL CA: CPT

## 2018-01-22 PROCEDURE — 6370000000 HC RX 637 (ALT 250 FOR IP): Performed by: NEUROLOGICAL SURGERY

## 2018-01-22 PROCEDURE — 6360000002 HC RX W HCPCS: Performed by: PSYCHIATRY & NEUROLOGY

## 2018-01-22 PROCEDURE — 93886 INTRACRANIAL COMPLETE STUDY: CPT

## 2018-01-22 PROCEDURE — 2060000000 HC ICU INTERMEDIATE R&B

## 2018-01-22 PROCEDURE — 82330 ASSAY OF CALCIUM: CPT

## 2018-01-22 PROCEDURE — 36415 COLL VENOUS BLD VENIPUNCTURE: CPT

## 2018-01-22 PROCEDURE — 6370000000 HC RX 637 (ALT 250 FOR IP): Performed by: NURSE PRACTITIONER

## 2018-01-22 PROCEDURE — 2580000003 HC RX 258: Performed by: EMERGENCY MEDICINE

## 2018-01-22 PROCEDURE — 6360000002 HC RX W HCPCS: Performed by: EMERGENCY MEDICINE

## 2018-01-22 PROCEDURE — 97116 GAIT TRAINING THERAPY: CPT

## 2018-01-22 RX ORDER — MAGNESIUM SULFATE 1 G/100ML
1 INJECTION INTRAVENOUS ONCE
Status: DISCONTINUED | OUTPATIENT
Start: 2018-01-22 | End: 2018-01-26 | Stop reason: HOSPADM

## 2018-01-22 RX ORDER — MAGNESIUM SULFATE 1 G/100ML
2 INJECTION INTRAVENOUS ONCE
Status: COMPLETED | OUTPATIENT
Start: 2018-01-22 | End: 2018-01-22

## 2018-01-22 RX ADMIN — NIMODIPINE 60 MG: 30 CAPSULE, LIQUID FILLED ORAL at 12:16

## 2018-01-22 RX ADMIN — ACETAMINOPHEN 650 MG: 325 TABLET ORAL at 12:41

## 2018-01-22 RX ADMIN — DOCUSATE SODIUM 100 MG: 100 CAPSULE, LIQUID FILLED ORAL at 08:14

## 2018-01-22 RX ADMIN — MAGNESIUM SULFATE HEPTAHYDRATE 2 G: 1 INJECTION, SOLUTION INTRAVENOUS at 03:22

## 2018-01-22 RX ADMIN — CYCLOBENZAPRINE HYDROCHLORIDE 10 MG: 5 TABLET, FILM COATED ORAL at 20:30

## 2018-01-22 RX ADMIN — FAMOTIDINE 20 MG: 20 TABLET, FILM COATED ORAL at 20:30

## 2018-01-22 RX ADMIN — NIMODIPINE 60 MG: 30 CAPSULE, LIQUID FILLED ORAL at 08:14

## 2018-01-22 RX ADMIN — ACETAMINOPHEN 650 MG: 325 TABLET ORAL at 23:39

## 2018-01-22 RX ADMIN — MYCOPHENOLATE MOFETIL 300 MG: 500 TABLET ORAL at 08:13

## 2018-01-22 RX ADMIN — HEPARIN SODIUM 5000 UNITS: 5000 INJECTION, SOLUTION INTRAVENOUS; SUBCUTANEOUS at 23:40

## 2018-01-22 RX ADMIN — MYCOPHENOLATE MOFETIL 300 MG: 500 TABLET ORAL at 20:30

## 2018-01-22 RX ADMIN — OXYCODONE HYDROCHLORIDE 10 MG: 5 TABLET ORAL at 03:26

## 2018-01-22 RX ADMIN — NIMODIPINE 60 MG: 30 CAPSULE, LIQUID FILLED ORAL at 23:40

## 2018-01-22 RX ADMIN — Medication 10 ML: at 08:17

## 2018-01-22 RX ADMIN — FAMOTIDINE 20 MG: 20 TABLET, FILM COATED ORAL at 08:14

## 2018-01-22 RX ADMIN — NIMODIPINE 60 MG: 30 CAPSULE, LIQUID FILLED ORAL at 16:28

## 2018-01-22 RX ADMIN — MYCOPHENOLATE MOFETIL 300 MG: 500 TABLET ORAL at 15:28

## 2018-01-22 RX ADMIN — MAGNESIUM SULFATE HEPTAHYDRATE 1 G: 1 INJECTION, SOLUTION INTRAVENOUS at 23:39

## 2018-01-22 RX ADMIN — OXYCODONE HYDROCHLORIDE 10 MG: 5 TABLET ORAL at 20:30

## 2018-01-22 RX ADMIN — HEPARIN SODIUM 5000 UNITS: 5000 INJECTION, SOLUTION INTRAVENOUS; SUBCUTANEOUS at 05:28

## 2018-01-22 RX ADMIN — HEPARIN SODIUM 5000 UNITS: 5000 INJECTION, SOLUTION INTRAVENOUS; SUBCUTANEOUS at 14:31

## 2018-01-22 RX ADMIN — NIMODIPINE 60 MG: 30 CAPSULE, LIQUID FILLED ORAL at 20:30

## 2018-01-22 RX ADMIN — MAGNESIUM SULFATE HEPTAHYDRATE 1 G: 1 INJECTION, SOLUTION INTRAVENOUS at 22:07

## 2018-01-22 RX ADMIN — POLYETHYLENE GLYCOL 3350 17 G: 17 POWDER, FOR SOLUTION ORAL at 08:14

## 2018-01-22 RX ADMIN — NIMODIPINE 60 MG: 30 CAPSULE, LIQUID FILLED ORAL at 03:23

## 2018-01-22 RX ADMIN — OXYCODONE HYDROCHLORIDE 10 MG: 5 TABLET ORAL at 16:28

## 2018-01-22 RX ADMIN — SIMVASTATIN 40 MG: 40 TABLET, FILM COATED ORAL at 20:30

## 2018-01-22 ASSESSMENT — PAIN SCALES - GENERAL
PAINLEVEL_OUTOF10: 5
PAINLEVEL_OUTOF10: 8
PAINLEVEL_OUTOF10: 5
PAINLEVEL_OUTOF10: 3
PAINLEVEL_OUTOF10: 4
PAINLEVEL_OUTOF10: 3
PAINLEVEL_OUTOF10: 3
PAINLEVEL_OUTOF10: 4

## 2018-01-22 ASSESSMENT — PAIN DESCRIPTION - DESCRIPTORS: DESCRIPTORS: ACHING

## 2018-01-22 ASSESSMENT — PAIN DESCRIPTION - PAIN TYPE
TYPE: CHRONIC PAIN
TYPE: ACUTE PAIN

## 2018-01-22 ASSESSMENT — PAIN DESCRIPTION - LOCATION
LOCATION: HEAD
LOCATION: HEAD

## 2018-01-22 ASSESSMENT — PAIN DESCRIPTION - FREQUENCY: FREQUENCY: CONTINUOUS

## 2018-01-22 ASSESSMENT — PAIN DESCRIPTION - ONSET: ONSET: ON-GOING

## 2018-01-22 ASSESSMENT — PAIN DESCRIPTION - ORIENTATION: ORIENTATION: OTHER (COMMENT)

## 2018-01-22 ASSESSMENT — PAIN DESCRIPTION - PROGRESSION: CLINICAL_PROGRESSION: NOT CHANGED

## 2018-01-22 NOTE — PROGRESS NOTES
Physical Therapy  Facility/Department: [address]  Daily Treatment Note  NAME: Trini Le  : 1975  MRN: [id number]    Date of Service: 2018    Patient Diagnosis(es):   Patient Active Problem List    Diagnosis Date Noted    Hydrocephalus     Cerebral salt-wasting     Generalized headache     Ruptured cerebral aneurysm ([address]     Middle cerebral artery aneurysm     Aneurysmal subarachnoid hemorrhage ([address]     SAH (subarachnoid hemorrhage) ([address] 2018    Tobacco use 2017    Genital warts 2017    Migraine        Past Medical History:   Diagnosis Date    Caffeinism     [employer]    Generalized headaches     Low back pain     Migraine     Migraines stopped after birth of first daughter in [address] Patient in clinical research study 2018    TARGET: Consented 2018, Expected Completion Date 2018    Recurrent sinus infections 2017    Ruptured middle cerebral artery aneurysm ([address] 2018    Right MCA    SAH (subarachnoid hemorrhage) ([address] 2018    Tobacco use      Past Surgical History:   Procedure Laterality Date    BRAIN ANEURYSM SURGERY  2018    coil ablation ; MCA     OTHER SURGICAL HISTORY  2013    Pelvic Ablasion    TUBAL LIGATION  2011       Restrictions  Restrictions/Precautions  Restrictions/Precautions: General Precautions  Required Braces or Orthoses?: No  Position Activity Restriction  Other position/activity restrictions: Up with assist  Subjective   General  Response To Previous Treatment: Patient with no complaints from previous session.   Family / Caregiver Present: No  Pain Screening  Patient Currently in Pain: Denies  Vital Signs  Patient Currently in Pain: Denies       Orientation  Orientation  Overall Orientation Status: Within Normal Limits  Objective   Bed mobility  Supine to Sit: Independent  Sit to Supine: Independent  Scooting: Independent  Transfers  Sit to Stand: Independent  Stand to sit: Independent  Bed to Chair: Independent  Ambulation  Ambulation?: Yes  Ambulation 1  Surface: level tile  Device: No Device  Assistance: Independent  Quality of Gait: Patient monique steady, normal paced gait without LOB throughout session today  Distance: 80 feet x 1  Stairs/Curb  Stairs?: Yes  Stairs  # Steps : 10  Stairs Height: 6\"  Rails: Right ascending  Device: No Device  Assistance: Independent     Balance  Posture: Good  Sitting - Static: Good  Sitting - Dynamic: Good  Standing - Static: Good  Standing - Dynamic: Good  Other exercises  Other exercises?: Yes (Performed 10 reps each of standing balance exercises to assess for safety without AD )          Assessment   Prognosis: Good    Patient tolerated session well with no deficits noted in bed mobility, transfers, ambulation, balance, and endurance this session. Monique improved tolerance to activity today evidenced by improved IND with all mobility this session. At current level of function, patient monique no deficits that require continued inpatient PT services at this time.      No Skilled PT: Independent with functional mobility   REQUIRES PT FOLLOW UP: No  Activity Tolerance  Activity Tolerance: Patient Tolerated treatment well           Goals  Short term goals  Time Frame for Short term goals: 6 visits  Short term goal 1: Perform bed mobility and functional transfers independently  Short term goal 2: Ascend/descend 30 steps with 1 HR independently  Short term goal 3: Demo good dynamic standing balance without AD to decrease risk of falls  Patient Goals   Patient goals : return home    Plan    Plan  Times per week: 5-6x  Times per day: Daily  Current Treatment Recommendations: Strengthening, Balance Training, Functional Mobility Training, Transfer Training, Endurance Training, Gait Training, Stair training, Home Exercise Program, Safety Education & Training, Patient/Caregiver Education & Training  Plan Comment: D/C PT  Safety

## 2018-01-22 NOTE — PROGRESS NOTES
answers questions appropriately, and follows all commands. Moves all extremities equally without weakness. Denies vision changes, numbness/tingling, SOB, chest pain. : No acute events overnight. Pt states her HA is 3/10 this morning and that the motrin she received yesterday really helped to improve the HA that was worsened yesterday. She continues to deny N/V, vision changes, numbness, or weakness. She is tolerating PO and ambulating without difficulty. Mg of 2.7 overnight, got replacement of 2g.     CURRENT MEDICATIONS:  SCHEDULED MEDICATIONS:   sodium chloride flush  10 mL Intravenous 2 times per day    morphine  4 mg Intravenous Once    gabapentin  300 mg Oral TID    lidocaine 1 % injection  5 mL Intradermal Once    simvastatin  40 mg Oral Nightly    heparin (porcine)  5,000 Units Subcutaneous 3 times per day    lactulose  30 g Oral BID    famotidine  20 mg Oral BID    niMODipine  60 mg Oral 6 times per day    polyethylene glycol  17 g Oral Daily    nicotine  1 patch Transdermal Daily    docusate sodium  100 mg Oral Daily     CONTINUOUS INFUSIONS:   sodium chloride 150 mL/hr at 18 1053     PRN MEDICATIONS:   hydrALAZINE, sodium chloride flush, morphine, magnesium sulfate, cyclobenzaprine, ondansetron, magnesium hydroxide, acetaminophen, oxyCODONE **OR** oxyCODONE    VITALS:  Temperature Range: Temp: 97.3 °F (36.3 °C) Temp  Av.3 °F (36.8 °C)  Min: 97.3 °F (36.3 °C)  Max: 99.1 °F (37.3 °C)  BP Range: Systolic (12SIV), VVD:056 , Min:91 , RNQ:429     Diastolic (89QRS), UEA:92, Min:45, Max:97    Pulse Range: Pulse  Av.5  Min: 51  Max: 80  Respiration Range: Resp  Av.9  Min: 14  Max: 23  Current Pulse Ox: SpO2: 100 %  24HR Pulse Ox Range: SpO2  Av.9 %  Min: 94 %  Max: 100 %  Patient Vitals for the past 12 hrs:   BP Temp Temp src Pulse Resp SpO2   18 0743 103/69 97.3 °F (36.3 °C) Oral 68 15 100 %   18 0703 110/64 - - 51 16 96 %   18 0602 100/61 - - 53 15 98 %   01/22/18 0502 106/61 - - 56 16 95 %   01/22/18 0402 (!) 108/54 98 °F (36.7 °C) - 56 16 95 %   01/22/18 0302 (!) 91/53 - - 61 17 96 %   01/22/18 0202 (!) 95/59 - - 60 20 96 %   01/22/18 0102 106/62 - - 56 16 94 %   01/22/18 0002 (!) 93/46 97.9 °F (36.6 °C) - 62 20 96 %   01/21/18 2302 (!) 92/45 - - 61 16 95 %   01/21/18 2204 (!) 92/58 - - 74 19 97 %   01/21/18 2102 121/74 - - 80 21 96 %     Estimated body mass index is 24.87 kg/m² as calculated from the following:    Height as of this encounter: 5' 2\" (1.575 m). Weight as of this encounter: 136 lb (61.7 kg).  []<16 Severe malnutrition  []1616.99 Moderate malnutrition  []1718.49 Mild malnutrition  [x]18.524.9 Normal  []2529.9 Overweight (not obese)  []3034.9 Obese class 1 (Low Risk)  []3539.9 Obese class 2 (Moderate Risk)  []?40 Obese class 3 (High Risk)    RECENT LABS:   Lab Results   Component Value Date    WBC 11.9 (H) 01/22/2018    HGB 12.1 01/22/2018    HCT 36.0 (L) 01/22/2018     01/22/2018    CHOL 85 01/13/2018    TRIG 78 01/13/2018    HDL 32 (L) 01/13/2018     01/22/2018    K 3.7 01/22/2018     (H) 01/22/2018    CREATININE 0.50 01/22/2018    BUN 7 01/22/2018    CO2 19 (L) 01/22/2018    TSH 5.07 (H) 01/17/2018     24 HOUR INTAKE/OUTPUT:    Intake/Output Summary (Last 24 hours) at 01/22/18 0820  Last data filed at 01/22/18 0600   Gross per 24 hour   Intake             4418 ml   Output             3400 ml   Net             1018 ml       RADIOLOGY:   Ct Head Wo Contrast    Result Date: 1/18/2018  EXAMINATION: CT OF THE HEAD WITHOUT CONTRAST  1/18/2018 1:41 am TECHNIQUE: CT of the head was performed without the administration of intravenous contrast. Dose modulation, iterative reconstruction, and/or weight based adjustment of the mA/kV was utilized to reduce the radiation dose to as low as reasonably achievable.  COMPARISON: 01/13/2018 HISTORY: ORDERING SYSTEM PROVIDED HISTORY: follow up [facility] TECHNOLOGIST PROVIDED HISTORY: Has a Diagnostic cerebral angiography 2. Endovascular treatment of a ruptured cerebral aneurysm 3. Slow IA infusion of nicardipine Neurointerventionalist: Geo Roman Fellow: Rg Rodriguez MD and Lalito Finn MD Fluoro Time: 44.8 minutes Contrast amount: 162 cc of Visipaque-270 Side of access/closure: Right femoral artery. Anesthesia: General Anesthesia Vessels Catheterized: Right common carotid artery Right internal carotid artery Left common carotid artery Left internal carotid artery Left vertebral artery Right common femoral artery Clinical History: Prema Lopez?is a 44 y. o.?female?with PMH of smoking and migraine who presented with thunderclap headache at 10:45 am. She was taken to [address] [place] [address] and found to have right sylvian fissure SAH; subsequently she was transferred to our hospital.  The patient is now referred for diagnostic cerebral angiography and urgent endovascular treatment of the cerebral aneurysm with coil embolization. Procedure and Findings: The patient's right groin was prepped and draped in standard sterile fashion and under general anesthesia, a 6 Italian short intravascular sheath was placed within the right common femoral artery, establishing arterial access. A 6 [employer] Annabelle comScore guide catheter was then advanced over a guide wire to the level of the aortic arch, and used to selectively catheterize the right common carotid artery. Digital subtraction angiography of the right carotid bifurcation was performed in frontal and lateral projections. Arterial phase images reveal normal antegrade flow through the ICA and ECA. The carotid bifurcation appears without any stenosis. The right internal carotid artery was then selectively catheterized, and digital subtraction angiography of the intracranial right internal carotid circulation was performed in frontal, lateral and oblique projections.  3-D rotational angiography was also  performed for surgical treatment planning, to maximize the Practitioner    Nate Brown CNP  Additional Comments CLASSIFICATION OF VASOSPASM MEAN MCA VELOCITY ----- MCA/ICA VELOCITY RATIO ------- INTERPRETATION <120 cm/sec --------------------- less than 3 --------------------------- Normal, nonspecific elevation or distal MCA spasm >120 cm/sec ------------------------ 3 to 6 -------------------------------- Mild vasospasm of proximal MCA >160 cm/sec ------------------------ 3 to 6 -------------------------------- Moderate vasospasm of proximal MCA >200 cm/sec -------------------- greater than 6------------------------- Severe vasospasm of proximal MCA MEAN BASILAR ARTERY VELOCITY------- INTERPRETATION >60 cm/sec -------------------------------------------- Mild BA vasospasm >90 cm/sec -------------------------------------------- Moderate BA vasospasm >120 cm/sec ------------------------------------------ Severe BA vasospasm. Procedure Type of Study:   Cerebral: Transcranial.  Indications for Study:Subarachnoid hemorrhage. Risk Factors History +---------+----+-----------------------------------------------------------+ ! Diagnosis! Date! Comments                                                   ! +---------+----+-----------------------------------------------------------+ ! Other    !    !hematocrit 30.5                                            ! +---------+----+-----------------------------------------------------------+   - The patient has a current/recent (within 1 year) tobacco history.  Findings:   Right Impression:                     Left Impression:  Right Lindegaard Ratio = 3.65         Left Lindegaard Ratio = 2.76                                        MEAN VELOCITIES:  MEAN VELOCITIES:                      Transtemporal Approach  Transtemporal Approach                Proximal MCA: 102  Proximal MCA: 117                     Mid MCA: 109  Mid MCA: 119                          Distal MCA: 91.6  Distal MCA: 106                       Proximal LISE: 91.2  Proximal LISE: 86.6 Mid LISE: 108  Mid LISE: 79                           PCA: 37  PCA: 35.4                             T ICA: 91.6  T ICA: 45.8                           Submandibular Approach  Submandibular Approach                D ICA: 37  D ICA: 32.7                           Transorbital Approach  Transorbital Approach                 Siphon: 46.2  Siphon: 54.7                          Transforamenal Approach  Transforamenal Approach               Vertebral: 48.5  Vertebral: 32.7  Patient Status: In Patient. Velocities are measured in cm/s ; Diameters are measured in mm Right Transcranial Duplex Measurements Right Transforamenal Approach +-----------+-------+-------+--------+-------+-------+--------+------------+ ! Location   ! PSV    ! EDV    ! Vmean   ! RI     ! PI     ! Depth   ! Direction   ! +-----------+-------+-------+--------+-------+-------+--------+------------+ ! Basilar    !97.8   !40.8   !59.8    !0.58   !0.82   !        !            ! +-----------+-------+-------+--------+-------+-------+--------+------------+ Left Transcranial Duplex Measurements Left Transforamenal Approach +-----------+-------+-------+--------+-------+-------+--------+------------+ ! Location   ! PSV    ! EDV    ! Vmean   ! RI     ! PI     ! Depth   ! Direction   ! +-----------+-------+-------+--------+-------+-------+--------+------------+ ! Basilar    !97.8   !40.8   !59.8    !0.58   !0.82   !        !            ! +-----------+-------+-------+--------+-------+-------+--------+------------+  Conclusions   Summary   DOS: 1/15/2018  This is a complete TCD showing:   Diffuse mild to moderate cerebral vasospasm (Worse on the right MCA with  Max MCBFVs of 119) with right Lindegaard ratio of 3.65   Recommendations   Clinical correlation is recommended as per the primary team  Follow up TCD as per the primary team   Signature   ----------------------------------------------------------------  Electronically signed by Berna Benjamin(Sonographer) on Continue to monitor blood glucose    OTHER:  - PT/OT  - Nicotine patch    PROPHYLAXIS:  Stress ulcer: H2 blocker, pepcid 20mg BID    DVT PROPHYLAXIS:  - SCD sleeves - Thigh High   - AIDAN stockings - Thigh High  - Heparin 5000units Q8h    DISPOSITION:  [x] To remain ICU for close neurological monitoring. We will continue to follow along. For any changes in exam or patient status please contact Neuro Critical Care.       Erica Cheng MD  Neuro Critical Care  Pager [phone]  1/22/2018     8:20 AM

## 2018-01-22 NOTE — PROGRESS NOTES
Department of Endovascular Neurosurgery  Fellow Progress Note      SUBJECTIVE:    No acute events. OBJECTIVE    Physical  VITALS:  /69   Pulse 68   Temp 97.3 °F (36.3 °C) (Oral)   Resp 15   Ht 5' 2\" (1.575 m)   Wt 136 lb (61.7 kg)   SpO2 100%   BMI 24.87 kg/m²      NEUROLOGIC:    Alert,awake, oriented to name, place and time. Cranial nerves II-XII are grossly intact. Motor is 5 out of 5 bilaterally. Sensory: intact to LT in 4 extremities.      Data  CBC:   Lab Results   Component Value Date    WBC 11.9 01/22/2018    RBC 3.79 01/22/2018    HGB 12.1 01/22/2018    HCT 36.0 01/22/2018    MCV 95.0 01/22/2018    MCH 31.9 01/22/2018    MCHC 33.6 01/22/2018    RDW 13.4 01/22/2018     01/22/2018    MPV 8.6 01/22/2018     CMP:    Lab Results   Component Value Date     01/22/2018    K 3.7 01/22/2018     01/22/2018    CO2 19 01/22/2018    BUN 7 01/22/2018    CREATININE 0.50 01/22/2018    GFRAA >60 01/22/2018    LABGLOM >60 01/22/2018    GLUCOSE 91 01/22/2018    CALCIUM 8.4 01/22/2018     Current Inpatient Medications  Current Facility-Administered Medications: hydrALAZINE (APRESOLINE) injection 5 mg, 5 mg, Intravenous, Q4H PRN  sodium chloride flush 0.9 % injection 10 mL, 10 mL, Intravenous, 2 times per day  sodium chloride flush 0.9 % injection 10 mL, 10 mL, Intravenous, PRN  morphine (PF) injection 4 mg, 4 mg, Intravenous, Once  morphine injection 2 mg, 2 mg, Intravenous, Q1H PRN  gabapentin (NEURONTIN) capsule 300 mg, 300 mg, Oral, TID  lidocaine 1 % injection 5 mL, 5 mL, Intradermal, Once  simvastatin (ZOCOR) tablet 40 mg, 40 mg, Oral, Nightly  heparin (porcine) injection 5,000 Units, 5,000 Units, Subcutaneous, 3 times per day  lactulose (CHRONULAC) 10 GM/15ML solution 30 g, 30 g, Oral, BID  famotidine (PEPCID) tablet 20 mg, 20 mg, Oral, BID  0.9 % sodium chloride infusion, , Intravenous, Continuous  magnesium sulfate 1 g in dextrose 5% 100 mL IVPB, 1 g, Intravenous,

## 2018-01-22 NOTE — PLAN OF CARE
Problem: Falls - Risk of  Goal: Absence of falls  Outcome: Ongoing  Safety maintained throughout this shift. Bed in low position . Side rails up x 2. Call light in reach and uses it appropriately . Vital signs stable. Will continue to monitor.

## 2018-01-22 NOTE — PROGRESS NOTES
consipation     ID/HEME:  - Afebrile, tmax 37  - Mild leukocytosis, WBC 11.9  - H&H stable, 12.1/36  - Platelets 748  - Daily CBC   - LP - neg cx x3 days     ENDOCRINE:  - Continue to monitor blood glucose     OTHER:  - PT/OT  - Nicotine patch     PROPHYLAXIS:  Stress ulcer: H2 blocker, pepcid 20mg BID     DVT PROPHYLAXIS:  - SCD sleeves - Thigh High   - AIDAN stockings - Thigh High  - Heparin 5000units Q8h           Above mentioned assessment and plan was discussed by me with the admitting medicine resident. The medicine team assigned to the patient by medicine admitting resident will be following up the patient from now onwards on the floor.      Marcia Ramsay M.D.  PGY - 2  Department of Internal Medicine/ Critical care  [facility] ([place]             1/22/2018, 5:07 PM

## 2018-01-23 LAB
ABSOLUTE EOS #: 0.57 K/UL (ref 0–0.44)
ABSOLUTE IMMATURE GRANULOCYTE: 0.03 K/UL (ref 0–0.3)
ABSOLUTE LYMPH #: 3.06 K/UL (ref 1.1–3.7)
ABSOLUTE MONO #: 0.94 K/UL (ref 0.1–1.2)
ANION GAP SERPL CALCULATED.3IONS-SCNC: 13 MMOL/L (ref 9–17)
BASOPHILS # BLD: 1 % (ref 0–2)
BASOPHILS ABSOLUTE: 0.04 K/UL (ref 0–0.2)
BUN BLDV-MCNC: 5 MG/DL (ref 6–20)
BUN/CREAT BLD: ABNORMAL (ref 9–20)
CALCIUM IONIZED: 1.19 MMOL/L (ref 1.13–1.33)
CALCIUM SERPL-MCNC: 8.8 MG/DL (ref 8.6–10.4)
CHLORIDE BLD-SCNC: 109 MMOL/L (ref 98–107)
CO2: 19 MMOL/L (ref 20–31)
CREAT SERPL-MCNC: 0.58 MG/DL (ref 0.5–0.9)
DIFFERENTIAL TYPE: ABNORMAL
EOSINOPHILS RELATIVE PERCENT: 7 % (ref 1–4)
GFR AFRICAN AMERICAN: >60 ML/MIN
GFR NON-AFRICAN AMERICAN: >60 ML/MIN
GFR SERPL CREATININE-BSD FRML MDRD: ABNORMAL ML/MIN/{1.73_M2}
GFR SERPL CREATININE-BSD FRML MDRD: ABNORMAL ML/MIN/{1.73_M2}
GLUCOSE BLD-MCNC: 87 MG/DL (ref 70–99)
HCT VFR BLD CALC: 35.4 % (ref 36.3–47.1)
HEMOGLOBIN: 11.7 G/DL (ref 11.9–15.1)
IMMATURE GRANULOCYTES: 0 %
LYMPHOCYTES # BLD: 40 % (ref 24–43)
MAGNESIUM: 2 MG/DL (ref 1.6–2.6)
MAGNESIUM: 2.1 MG/DL (ref 1.6–2.6)
MCH RBC QN AUTO: 31.6 PG (ref 25.2–33.5)
MCHC RBC AUTO-ENTMCNC: 33.1 G/DL (ref 28.4–34.8)
MCV RBC AUTO: 95.7 FL (ref 82.6–102.9)
MONOCYTES # BLD: 12 % (ref 3–12)
NRBC AUTOMATED: 0 PER 100 WBC
PDW BLD-RTO: 13.2 % (ref 11.8–14.4)
PHOSPHORUS: 4.6 MG/DL (ref 2.6–4.5)
PLATELET # BLD: 292 K/UL (ref 138–453)
PLATELET ESTIMATE: ABNORMAL
PMV BLD AUTO: 9.1 FL (ref 8.1–13.5)
POTASSIUM SERPL-SCNC: 4.3 MMOL/L (ref 3.7–5.3)
RBC # BLD: 3.7 M/UL (ref 3.95–5.11)
RBC # BLD: ABNORMAL 10*6/UL
SEG NEUTROPHILS: 40 % (ref 36–65)
SEGMENTED NEUTROPHILS ABSOLUTE COUNT: 3.08 K/UL (ref 1.5–8.1)
SODIUM BLD-SCNC: 141 MMOL/L (ref 135–144)
WBC # BLD: 7.7 K/UL (ref 3.5–11.3)
WBC # BLD: ABNORMAL 10*3/UL

## 2018-01-23 PROCEDURE — 83735 ASSAY OF MAGNESIUM: CPT

## 2018-01-23 PROCEDURE — 6370000000 HC RX 637 (ALT 250 FOR IP): Performed by: NURSE PRACTITIONER

## 2018-01-23 PROCEDURE — 6360000002 HC RX W HCPCS: Performed by: NURSE PRACTITIONER

## 2018-01-23 PROCEDURE — 6360000002 HC RX W HCPCS: Performed by: PSYCHIATRY & NEUROLOGY

## 2018-01-23 PROCEDURE — 6370000000 HC RX 637 (ALT 250 FOR IP): Performed by: PSYCHIATRY & NEUROLOGY

## 2018-01-23 PROCEDURE — 85025 COMPLETE CBC W/AUTO DIFF WBC: CPT

## 2018-01-23 PROCEDURE — 6370000000 HC RX 637 (ALT 250 FOR IP): Performed by: EMERGENCY MEDICINE

## 2018-01-23 PROCEDURE — 99232 SBSQ HOSP IP/OBS MODERATE 35: CPT | Performed by: PSYCHIATRY & NEUROLOGY

## 2018-01-23 PROCEDURE — 2060000000 HC ICU INTERMEDIATE R&B

## 2018-01-23 PROCEDURE — 80048 BASIC METABOLIC PNL TOTAL CA: CPT

## 2018-01-23 PROCEDURE — 93886 INTRACRANIAL COMPLETE STUDY: CPT

## 2018-01-23 PROCEDURE — 94762 N-INVAS EAR/PLS OXIMTRY CONT: CPT

## 2018-01-23 PROCEDURE — 99232 SBSQ HOSP IP/OBS MODERATE 35: CPT | Performed by: INTERNAL MEDICINE

## 2018-01-23 PROCEDURE — 36415 COLL VENOUS BLD VENIPUNCTURE: CPT

## 2018-01-23 PROCEDURE — 84100 ASSAY OF PHOSPHORUS: CPT

## 2018-01-23 PROCEDURE — 6370000000 HC RX 637 (ALT 250 FOR IP): Performed by: NEUROLOGICAL SURGERY

## 2018-01-23 PROCEDURE — 82330 ASSAY OF CALCIUM: CPT

## 2018-01-23 RX ORDER — MAGNESIUM SULFATE IN WATER 40 MG/ML
4 INJECTION, SOLUTION INTRAVENOUS ONCE
Status: DISCONTINUED | OUTPATIENT
Start: 2018-01-23 | End: 2018-01-23

## 2018-01-23 RX ORDER — MAGNESIUM SULFATE 4 G/50ML
4 INJECTION INTRAVENOUS ONCE
Status: DISCONTINUED | OUTPATIENT
Start: 2018-01-23 | End: 2018-01-23

## 2018-01-23 RX ORDER — MAGNESIUM SULFATE 1 G/100ML
1 INJECTION INTRAVENOUS
Status: COMPLETED | OUTPATIENT
Start: 2018-01-23 | End: 2018-01-23

## 2018-01-23 RX ADMIN — OXYCODONE HYDROCHLORIDE 10 MG: 5 TABLET ORAL at 04:49

## 2018-01-23 RX ADMIN — HEPARIN SODIUM 5000 UNITS: 5000 INJECTION, SOLUTION INTRAVENOUS; SUBCUTANEOUS at 06:15

## 2018-01-23 RX ADMIN — OXYCODONE HYDROCHLORIDE 10 MG: 5 TABLET ORAL at 13:39

## 2018-01-23 RX ADMIN — OXYCODONE HYDROCHLORIDE 10 MG: 5 TABLET ORAL at 00:39

## 2018-01-23 RX ADMIN — MYCOPHENOLATE MOFETIL 300 MG: 500 TABLET ORAL at 13:39

## 2018-01-23 RX ADMIN — NIMODIPINE 60 MG: 30 CAPSULE, LIQUID FILLED ORAL at 12:02

## 2018-01-23 RX ADMIN — SIMVASTATIN 40 MG: 40 TABLET, FILM COATED ORAL at 20:20

## 2018-01-23 RX ADMIN — MAGNESIUM SULFATE HEPTAHYDRATE 1 G: 1 INJECTION, SOLUTION INTRAVENOUS at 08:28

## 2018-01-23 RX ADMIN — ACETAMINOPHEN 650 MG: 325 TABLET ORAL at 20:19

## 2018-01-23 RX ADMIN — ACETAMINOPHEN 650 MG: 325 TABLET ORAL at 08:11

## 2018-01-23 RX ADMIN — ACETAMINOPHEN 650 MG: 325 TABLET ORAL at 12:02

## 2018-01-23 RX ADMIN — NIMODIPINE 60 MG: 30 CAPSULE, LIQUID FILLED ORAL at 08:15

## 2018-01-23 RX ADMIN — HEPARIN SODIUM 5000 UNITS: 5000 INJECTION, SOLUTION INTRAVENOUS; SUBCUTANEOUS at 22:24

## 2018-01-23 RX ADMIN — OXYCODONE HYDROCHLORIDE 10 MG: 5 TABLET ORAL at 22:26

## 2018-01-23 RX ADMIN — MYCOPHENOLATE MOFETIL 300 MG: 500 TABLET ORAL at 08:14

## 2018-01-23 RX ADMIN — ACETAMINOPHEN 650 MG: 325 TABLET ORAL at 16:17

## 2018-01-23 RX ADMIN — ACETAMINOPHEN 650 MG: 325 TABLET ORAL at 03:31

## 2018-01-23 RX ADMIN — POLYETHYLENE GLYCOL 3350 17 G: 17 POWDER, FOR SOLUTION ORAL at 08:16

## 2018-01-23 RX ADMIN — OXYCODONE HYDROCHLORIDE 10 MG: 5 TABLET ORAL at 09:36

## 2018-01-23 RX ADMIN — NIMODIPINE 60 MG: 30 CAPSULE, LIQUID FILLED ORAL at 16:18

## 2018-01-23 RX ADMIN — HEPARIN SODIUM 5000 UNITS: 5000 INJECTION, SOLUTION INTRAVENOUS; SUBCUTANEOUS at 13:40

## 2018-01-23 RX ADMIN — DOCUSATE SODIUM 100 MG: 100 CAPSULE, LIQUID FILLED ORAL at 08:15

## 2018-01-23 RX ADMIN — FAMOTIDINE 20 MG: 20 TABLET, FILM COATED ORAL at 08:15

## 2018-01-23 RX ADMIN — MAGNESIUM SULFATE HEPTAHYDRATE 1 G: 1 INJECTION, SOLUTION INTRAVENOUS at 10:52

## 2018-01-23 RX ADMIN — NIMODIPINE 60 MG: 30 CAPSULE, LIQUID FILLED ORAL at 04:49

## 2018-01-23 RX ADMIN — OXYCODONE HYDROCHLORIDE 10 MG: 5 TABLET ORAL at 18:25

## 2018-01-23 RX ADMIN — NIMODIPINE 60 MG: 30 CAPSULE, LIQUID FILLED ORAL at 20:21

## 2018-01-23 RX ADMIN — MYCOPHENOLATE MOFETIL 300 MG: 500 TABLET ORAL at 20:22

## 2018-01-23 RX ADMIN — MAGNESIUM SULFATE HEPTAHYDRATE 1 G: 1 INJECTION, SOLUTION INTRAVENOUS at 09:39

## 2018-01-23 RX ADMIN — FAMOTIDINE 20 MG: 20 TABLET, FILM COATED ORAL at 20:21

## 2018-01-23 RX ADMIN — MAGNESIUM SULFATE HEPTAHYDRATE 1 G: 1 INJECTION, SOLUTION INTRAVENOUS at 22:23

## 2018-01-23 RX ADMIN — MAGNESIUM SULFATE HEPTAHYDRATE 1 G: 1 INJECTION, SOLUTION INTRAVENOUS at 11:53

## 2018-01-23 ASSESSMENT — PAIN SCALES - GENERAL
PAINLEVEL_OUTOF10: 3
PAINLEVEL_OUTOF10: 7
PAINLEVEL_OUTOF10: 2
PAINLEVEL_OUTOF10: 1
PAINLEVEL_OUTOF10: 5
PAINLEVEL_OUTOF10: 2
PAINLEVEL_OUTOF10: 3
PAINLEVEL_OUTOF10: 6
PAINLEVEL_OUTOF10: 7
PAINLEVEL_OUTOF10: 0
PAINLEVEL_OUTOF10: 2
PAINLEVEL_OUTOF10: 3
PAINLEVEL_OUTOF10: 7
PAINLEVEL_OUTOF10: 4
PAINLEVEL_OUTOF10: 4
PAINLEVEL_OUTOF10: 8
PAINLEVEL_OUTOF10: 3

## 2018-01-23 ASSESSMENT — PAIN DESCRIPTION - PROGRESSION: CLINICAL_PROGRESSION: NOT CHANGED

## 2018-01-23 ASSESSMENT — PAIN DESCRIPTION - FREQUENCY
FREQUENCY: CONTINUOUS
FREQUENCY: CONTINUOUS

## 2018-01-23 ASSESSMENT — PAIN DESCRIPTION - LOCATION
LOCATION: HEAD
LOCATION: HEAD

## 2018-01-23 ASSESSMENT — PAIN DESCRIPTION - ONSET: ONSET: ON-GOING

## 2018-01-23 ASSESSMENT — PAIN DESCRIPTION - PAIN TYPE
TYPE: ACUTE PAIN
TYPE: ACUTE PAIN

## 2018-01-23 ASSESSMENT — PAIN DESCRIPTION - DESCRIPTORS
DESCRIPTORS: ACHING;CONSTANT
DESCRIPTORS: HEADACHE

## 2018-01-23 ASSESSMENT — PAIN DESCRIPTION - ORIENTATION: ORIENTATION: DISTAL;PROXIMAL

## 2018-01-23 NOTE — PROGRESS NOTES
(Oral)   Resp 17   Ht 5' 2\" (1.575 m)   Wt 136 lb (61.7 kg)   SpO2 98%   BMI 24.87 kg/m²     Last Body weight:   Wt Readings from Last 3 Encounters:   01/12/18 136 lb (61.7 kg)   11/18/17 138 lb (62.6 kg)   09/07/17 136 lb 11.2 oz (62 kg)       Intake and Output summary:   Intake/Output Summary (Last 24 hours) at 01/23/18 0947  Last data filed at 01/22/18 1800   Gross per 24 hour   Intake              978 ml   Output             1200 ml   Net             -222 ml         PHYSICAL EXAMINATION:  Vitals:    01/22/18 1803 01/22/18 1925 01/22/18 2350 01/23/18 0331   BP: 117/77 133/84 117/65 103/69   Pulse: 68 74 65 58   Resp: 18 22 19 17   Temp:  97.9 °F (36.6 °C) 98.8 °F (37.1 °C) 97.9 °F (36.6 °C)   TempSrc:  Oral Oral Oral   SpO2: 97% 99% 97% 98%   Weight:       Height:         Constitutional: This is a well developed, well nourished,  44y.o. year old female who is alert, oriented, cooperative and in no apparent distress. Head:normocephalic and atraumatic. ENT:   Wild Parrot. No conjunctival injections. Septum was midline, mucosa was without erythema, exudates or cobblestoning. No thrush was noted. Neck: Supple without thyromegaly. No elevated JVP. Trachea was midline. Respiratory: Chest was symmetrical without dullness to percussion. Breath sounds bilaterally were clear to auscultation. There were no wheezes, rhonchi or rales. There is no intercostal retraction or use of accessory muscles. No egophony noted. Cardiovascular: Regular without murmur, clicks, gallops or rubs. There is no left or right ventricular heave. Abdomen: Slightly rounded and soft without organomegaly. No rebound tenderness, rigidity or guarding was appreciated. Lymphatic: No lymphadenopathy. Musculoskeletal: Normal curvature of the spine. No gross muscle weakness. Extremities:  No lower extremity edema, ulcerations, tenderness, varicosities or erythema.   Muscle size, tone and strength are normal.  No involuntary movements cultures:      [] None drawn      [] Negative             []  Positive (Details:  )  Urine Culture:        [] None drawn      [] Negative             []  Positive (Details:  )  Sputum Culture:   [] None drawn       [] Negative             []  Positive (Details:  )     ASSESSMENT:      Active Problems:    SAH (subarachnoid hemorrhage) (HCC)    Aneurysmal subarachnoid hemorrhage (HCC)    Generalized headache    Ruptured cerebral aneurysm (HCC)    Middle cerebral artery aneurysm          PLAN:    1. Subarachnoid hemorrhage:   Primary coil embolization of right MCA bifurcation aneurysm. S/P LP on 1/19 due to concern for increased ICP due to persistent HA post coiling; opening pressure 35, 24cc sent for testing, Protein 61/Glucose 35/RBC 56047/. Goal Magnesium levels 2.5-4 to prevent vasospasm; got 2g Mag Sulfate overnight, recheck 2.8, Q8h Magnesium checks. Continue Neurontin to 300mg TID. Pain control; morphine 2mg Q1hPRN, roxicodone 5-10mg Q4hPRN, flexeril 10mg TIDPRN. Monitor sodium as there is high risk for cerebral salt wasting.      Neuro endovascular wants to observe patient till Friday. So patient will not be discharged till Friday.         DC planning: Home with assistance from significant others.   aKty Nettles M.D.   [facility] ([facility])             1/23/2018, 9:47 AM

## 2018-01-23 NOTE — PROGRESS NOTES
Department of Endovascular Neurosurgery  Fellow Progress Note      SUBJECTIVE:    No acute events. OBJECTIVE    Physical  VITALS:  /69   Pulse 58   Temp 97.9 °F (36.6 °C) (Oral)   Resp 17   Ht 5' 2\" (1.575 m)   Wt 136 lb (61.7 kg)   SpO2 98%   BMI 24.87 kg/m²      NEUROLOGIC:    Alert,awake, oriented to name, place and time. Cranial nerves II-XII are grossly intact. Motor is 5 out of 5 bilaterally. Sensory: intact to LT in 4 extremities.      Data  CBC:   Lab Results   Component Value Date    WBC 11.9 01/22/2018    RBC 3.79 01/22/2018    HGB 12.1 01/22/2018    HCT 36.0 01/22/2018    MCV 95.0 01/22/2018    MCH 31.9 01/22/2018    MCHC 33.6 01/22/2018    RDW 13.4 01/22/2018     01/22/2018    MPV 8.6 01/22/2018     CMP:    Lab Results   Component Value Date     01/22/2018    K 3.7 01/22/2018     01/22/2018    CO2 19 01/22/2018    BUN 7 01/22/2018    CREATININE 0.50 01/22/2018    GFRAA >60 01/22/2018    LABGLOM >60 01/22/2018    GLUCOSE 91 01/22/2018    CALCIUM 8.4 01/22/2018     Current Inpatient Medications  Current Facility-Administered Medications: magnesium sulfate 1 g in dextrose 5% 100 mL IVPB, 1 g, Intravenous, Once  hydrALAZINE (APRESOLINE) injection 5 mg, 5 mg, Intravenous, Q4H PRN  sodium chloride flush 0.9 % injection 10 mL, 10 mL, Intravenous, 2 times per day  sodium chloride flush 0.9 % injection 10 mL, 10 mL, Intravenous, PRN  morphine (PF) injection 4 mg, 4 mg, Intravenous, Once  gabapentin (NEURONTIN) capsule 300 mg, 300 mg, Oral, TID  lidocaine 1 % injection 5 mL, 5 mL, Intradermal, Once  simvastatin (ZOCOR) tablet 40 mg, 40 mg, Oral, Nightly  heparin (porcine) injection 5,000 Units, 5,000 Units, Subcutaneous, 3 times per day  famotidine (PEPCID) tablet 20 mg, 20 mg, Oral, BID  0.9 % sodium chloride infusion, , Intravenous, Continuous  magnesium sulfate 1 g in dextrose 5% 100 mL IVPB, 1 g, Intravenous, PRN  niMODipine (NIMOTOP) capsule 60 mg, 60 mg, Oral, 6

## 2018-01-23 NOTE — PROGRESS NOTES
hydroxide, acetaminophen, oxyCODONE **OR** oxyCODONE    Objective:    CBC:   Recent Labs      01/20/18   0633  01/21/18   0648  01/22/18   0608   WBC  12.8*  10.2  11.9*   HGB  11.9  12.3  12.1   PLT  291  283  274     BMP:  Recent Labs      01/20/18   0633  01/21/18   0648  01/22/18   0608   NA  136  137  140   K  4.2  4.6  3.7   CL  105  105  108*   CO2  19*  20  19*   BUN  5*  5*  7   CREATININE  0.47*  0.54  0.50   GLUCOSE  101*  92  91     Calcium:  Recent Labs      01/22/18   0608   CALCIUM  8.4*     Ionized Calcium:No results for input(s): IONCA in the last 72 hours. Magnesium:  Recent Labs      01/22/18 2052   MG  1.6     Phosphorus:  Recent Labs      01/22/18   0608   PHOS  4.2     BNP:No results for input(s): BNP in the last 72 hours. Glucose:No results for input(s): POCGLU in the last 72 hours. HgbA1C: No results for input(s): LABA1C in the last 72 hours. INR: No results for input(s): INR in the last 72 hours. Hepatic: No results for input(s): ALKPHOS, ALT, AST, PROT, BILITOT, BILIDIR, LABALBU in the last 72 hours. Amylase and Lipase:No results for input(s): LACTA, AMYLASE in the last 72 hours. Lactic Acid: No results for input(s): LACTA in the last 72 hours. CARDIAC ENZYMES:No results for input(s): CKTOTAL, CKMB, CKMBINDEX, TROPONINI in the last 72 hours. BNP: No results for input(s): BNP in the last 72 hours. Lipids: No results for input(s): CHOL, TRIG, HDL, LDLCALC in the last 72 hours. Invalid input(s): LDL  ABGs: No results found for: PH, PCO2, PO2, HCO3, O2SAT  Thyroid:   Lab Results   Component Value Date    TSH 5.07 01/17/2018        Urinalysis: No results for input(s): BACTERIA, BLOODU, CLARITYU, COLORU, PHUR, PROTEINU, RBCUA, SPECGRAV, BILIRUBINUR, NITRU, WBCUA, LEUKOCYTESUR, GLUCOSEU in the last 72 hours.     CULTURES:         Assessment:  Active Problems:    SAH (subarachnoid hemorrhage) (HCC)    Aneurysmal subarachnoid hemorrhage (HCC)    Generalized headache    Ruptured

## 2018-01-24 LAB
ANION GAP SERPL CALCULATED.3IONS-SCNC: 11 MMOL/L (ref 9–17)
ANION GAP SERPL CALCULATED.3IONS-SCNC: 12 MMOL/L (ref 9–17)
BUN BLDV-MCNC: 8 MG/DL (ref 6–20)
BUN BLDV-MCNC: 9 MG/DL (ref 6–20)
BUN/CREAT BLD: ABNORMAL (ref 9–20)
BUN/CREAT BLD: ABNORMAL (ref 9–20)
CALCIUM SERPL-MCNC: 8.7 MG/DL (ref 8.6–10.4)
CALCIUM SERPL-MCNC: 9.1 MG/DL (ref 8.6–10.4)
CHLORIDE BLD-SCNC: 101 MMOL/L (ref 98–107)
CHLORIDE BLD-SCNC: 102 MMOL/L (ref 98–107)
CO2: 20 MMOL/L (ref 20–31)
CO2: 25 MMOL/L (ref 20–31)
CREAT SERPL-MCNC: 0.6 MG/DL (ref 0.5–0.9)
CREAT SERPL-MCNC: 0.75 MG/DL (ref 0.5–0.9)
GFR AFRICAN AMERICAN: >60 ML/MIN
GFR AFRICAN AMERICAN: >60 ML/MIN
GFR NON-AFRICAN AMERICAN: >60 ML/MIN
GFR NON-AFRICAN AMERICAN: >60 ML/MIN
GFR SERPL CREATININE-BSD FRML MDRD: ABNORMAL ML/MIN/{1.73_M2}
GLUCOSE BLD-MCNC: 110 MG/DL (ref 70–99)
GLUCOSE BLD-MCNC: 87 MG/DL (ref 70–99)
MAGNESIUM: 2.3 MG/DL (ref 1.6–2.6)
POTASSIUM SERPL-SCNC: 4.8 MMOL/L (ref 3.7–5.3)
POTASSIUM SERPL-SCNC: 4.9 MMOL/L (ref 3.7–5.3)
SODIUM BLD-SCNC: 134 MMOL/L (ref 135–144)
SODIUM BLD-SCNC: 137 MMOL/L (ref 135–144)

## 2018-01-24 PROCEDURE — 6370000000 HC RX 637 (ALT 250 FOR IP): Performed by: PSYCHIATRY & NEUROLOGY

## 2018-01-24 PROCEDURE — 6370000000 HC RX 637 (ALT 250 FOR IP): Performed by: NEUROLOGICAL SURGERY

## 2018-01-24 PROCEDURE — 2060000000 HC ICU INTERMEDIATE R&B

## 2018-01-24 PROCEDURE — 94762 N-INVAS EAR/PLS OXIMTRY CONT: CPT

## 2018-01-24 PROCEDURE — 6370000000 HC RX 637 (ALT 250 FOR IP): Performed by: NURSE PRACTITIONER

## 2018-01-24 PROCEDURE — 2580000003 HC RX 258: Performed by: EMERGENCY MEDICINE

## 2018-01-24 PROCEDURE — 6360000002 HC RX W HCPCS: Performed by: NEUROLOGICAL SURGERY

## 2018-01-24 PROCEDURE — 6360000002 HC RX W HCPCS: Performed by: PSYCHIATRY & NEUROLOGY

## 2018-01-24 PROCEDURE — 6370000000 HC RX 637 (ALT 250 FOR IP): Performed by: EMERGENCY MEDICINE

## 2018-01-24 PROCEDURE — 80048 BASIC METABOLIC PNL TOTAL CA: CPT

## 2018-01-24 PROCEDURE — 83735 ASSAY OF MAGNESIUM: CPT

## 2018-01-24 PROCEDURE — 93886 INTRACRANIAL COMPLETE STUDY: CPT

## 2018-01-24 PROCEDURE — 99232 SBSQ HOSP IP/OBS MODERATE 35: CPT | Performed by: PSYCHIATRY & NEUROLOGY

## 2018-01-24 PROCEDURE — 36415 COLL VENOUS BLD VENIPUNCTURE: CPT

## 2018-01-24 PROCEDURE — 99232 SBSQ HOSP IP/OBS MODERATE 35: CPT | Performed by: INTERNAL MEDICINE

## 2018-01-24 RX ORDER — MAGNESIUM SULFATE 4 G/50ML
4 INJECTION INTRAVENOUS ONCE
Status: DISCONTINUED | OUTPATIENT
Start: 2018-01-24 | End: 2018-01-24 | Stop reason: CLARIF

## 2018-01-24 RX ORDER — MAGNESIUM SULFATE 1 G/100ML
1 INJECTION INTRAVENOUS
Status: COMPLETED | OUTPATIENT
Start: 2018-01-24 | End: 2018-01-24

## 2018-01-24 RX ADMIN — MAGNESIUM SULFATE HEPTAHYDRATE 1 G: 1 INJECTION, SOLUTION INTRAVENOUS at 15:22

## 2018-01-24 RX ADMIN — ACETAMINOPHEN 650 MG: 325 TABLET ORAL at 15:25

## 2018-01-24 RX ADMIN — ACETAMINOPHEN 650 MG: 325 TABLET ORAL at 21:07

## 2018-01-24 RX ADMIN — MAGNESIUM SULFATE HEPTAHYDRATE 1 G: 1 INJECTION, SOLUTION INTRAVENOUS at 00:21

## 2018-01-24 RX ADMIN — NIMODIPINE 60 MG: 30 CAPSULE, LIQUID FILLED ORAL at 08:06

## 2018-01-24 RX ADMIN — Medication 10 ML: at 21:07

## 2018-01-24 RX ADMIN — POLYETHYLENE GLYCOL 3350 17 G: 17 POWDER, FOR SOLUTION ORAL at 08:06

## 2018-01-24 RX ADMIN — FAMOTIDINE 20 MG: 20 TABLET, FILM COATED ORAL at 21:07

## 2018-01-24 RX ADMIN — OXYCODONE HYDROCHLORIDE 10 MG: 5 TABLET ORAL at 21:33

## 2018-01-24 RX ADMIN — OXYCODONE HYDROCHLORIDE 10 MG: 5 TABLET ORAL at 07:21

## 2018-01-24 RX ADMIN — MYCOPHENOLATE MOFETIL 300 MG: 500 TABLET ORAL at 21:07

## 2018-01-24 RX ADMIN — SIMVASTATIN 40 MG: 40 TABLET, FILM COATED ORAL at 21:07

## 2018-01-24 RX ADMIN — Medication 10 ML: at 08:11

## 2018-01-24 RX ADMIN — MAGNESIUM SULFATE HEPTAHYDRATE 1 G: 1 INJECTION, SOLUTION INTRAVENOUS at 13:32

## 2018-01-24 RX ADMIN — OXYCODONE HYDROCHLORIDE 10 MG: 5 TABLET ORAL at 12:16

## 2018-01-24 RX ADMIN — ACETAMINOPHEN 650 MG: 325 TABLET ORAL at 10:05

## 2018-01-24 RX ADMIN — HEPARIN SODIUM 5000 UNITS: 5000 INJECTION, SOLUTION INTRAVENOUS; SUBCUTANEOUS at 13:35

## 2018-01-24 RX ADMIN — MYCOPHENOLATE MOFETIL 300 MG: 500 TABLET ORAL at 08:06

## 2018-01-24 RX ADMIN — MYCOPHENOLATE MOFETIL 300 MG: 500 TABLET ORAL at 13:36

## 2018-01-24 RX ADMIN — NIMODIPINE 60 MG: 30 CAPSULE, LIQUID FILLED ORAL at 00:20

## 2018-01-24 RX ADMIN — HEPARIN SODIUM 5000 UNITS: 5000 INJECTION, SOLUTION INTRAVENOUS; SUBCUTANEOUS at 22:47

## 2018-01-24 RX ADMIN — NIMODIPINE 60 MG: 30 CAPSULE, LIQUID FILLED ORAL at 21:07

## 2018-01-24 RX ADMIN — NIMODIPINE 60 MG: 30 CAPSULE, LIQUID FILLED ORAL at 04:58

## 2018-01-24 RX ADMIN — ACETAMINOPHEN 650 MG: 325 TABLET ORAL at 04:20

## 2018-01-24 RX ADMIN — NIMODIPINE 60 MG: 30 CAPSULE, LIQUID FILLED ORAL at 13:34

## 2018-01-24 RX ADMIN — DOCUSATE SODIUM 100 MG: 100 CAPSULE, LIQUID FILLED ORAL at 08:05

## 2018-01-24 RX ADMIN — HEPARIN SODIUM 5000 UNITS: 5000 INJECTION, SOLUTION INTRAVENOUS; SUBCUTANEOUS at 05:25

## 2018-01-24 RX ADMIN — OXYCODONE HYDROCHLORIDE 10 MG: 5 TABLET ORAL at 02:26

## 2018-01-24 RX ADMIN — NIMODIPINE 60 MG: 30 CAPSULE, LIQUID FILLED ORAL at 17:24

## 2018-01-24 RX ADMIN — MAGNESIUM SULFATE HEPTAHYDRATE 1 G: 1 INJECTION, SOLUTION INTRAVENOUS at 12:19

## 2018-01-24 RX ADMIN — OXYCODONE HYDROCHLORIDE 10 MG: 5 TABLET ORAL at 17:24

## 2018-01-24 RX ADMIN — ACETAMINOPHEN 650 MG: 325 TABLET ORAL at 00:20

## 2018-01-24 RX ADMIN — MAGNESIUM SULFATE HEPTAHYDRATE 1 G: 1 INJECTION, SOLUTION INTRAVENOUS at 16:24

## 2018-01-24 RX ADMIN — FAMOTIDINE 20 MG: 20 TABLET, FILM COATED ORAL at 08:05

## 2018-01-24 ASSESSMENT — PAIN SCALES - GENERAL
PAINLEVEL_OUTOF10: 7
PAINLEVEL_OUTOF10: 2
PAINLEVEL_OUTOF10: 2
PAINLEVEL_OUTOF10: 3
PAINLEVEL_OUTOF10: 3
PAINLEVEL_OUTOF10: 4
PAINLEVEL_OUTOF10: 3
PAINLEVEL_OUTOF10: 7
PAINLEVEL_OUTOF10: 3
PAINLEVEL_OUTOF10: 4
PAINLEVEL_OUTOF10: 7
PAINLEVEL_OUTOF10: 7
PAINLEVEL_OUTOF10: 4
PAINLEVEL_OUTOF10: 3
PAINLEVEL_OUTOF10: 3

## 2018-01-24 ASSESSMENT — PAIN DESCRIPTION - PAIN TYPE
TYPE: ACUTE PAIN

## 2018-01-24 ASSESSMENT — PAIN DESCRIPTION - LOCATION
LOCATION: HEAD

## 2018-01-24 ASSESSMENT — PAIN DESCRIPTION - DESCRIPTORS: DESCRIPTORS: HEADACHE

## 2018-01-24 NOTE — PROGRESS NOTES
Department of Endovascular Neurosurgery  Fellow Progress Note      SUBJECTIVE:    No acute events. OBJECTIVE    Physical  VITALS:  /66   Pulse 53   Temp 98.4 °F (36.9 °C) (Oral)   Resp 14   Ht 5' 2\" (1.575 m)   Wt 136 lb (61.7 kg)   SpO2 94%   BMI 24.87 kg/m²      NEUROLOGIC:    Alert,awake, oriented to name, place and time. Cranial nerves II-XII are grossly intact. Motor is 5 out of 5 bilaterally. Sensory: intact to LT in 4 extremities.      Data  CBC:   Lab Results   Component Value Date    WBC 7.7 01/23/2018    RBC 3.70 01/23/2018    HGB 11.7 01/23/2018    HCT 35.4 01/23/2018    MCV 95.7 01/23/2018    MCH 31.6 01/23/2018    MCHC 33.1 01/23/2018    RDW 13.2 01/23/2018     01/23/2018    MPV 9.1 01/23/2018     CMP:    Lab Results   Component Value Date     01/24/2018    K 4.9 01/24/2018     01/24/2018    CO2 20 01/24/2018    BUN 8 01/24/2018    CREATININE 0.60 01/24/2018    GFRAA >60 01/24/2018    LABGLOM >60 01/24/2018    GLUCOSE 87 01/24/2018    CALCIUM 9.1 01/24/2018     Current Inpatient Medications  Current Facility-Administered Medications: magnesium sulfate 1 g in dextrose 5% 100 mL IVPB, 1 g, Intravenous, Once  hydrALAZINE (APRESOLINE) injection 5 mg, 5 mg, Intravenous, Q4H PRN  sodium chloride flush 0.9 % injection 10 mL, 10 mL, Intravenous, 2 times per day  sodium chloride flush 0.9 % injection 10 mL, 10 mL, Intravenous, PRN  morphine (PF) injection 4 mg, 4 mg, Intravenous, Once  gabapentin (NEURONTIN) capsule 300 mg, 300 mg, Oral, TID  lidocaine 1 % injection 5 mL, 5 mL, Intradermal, Once  simvastatin (ZOCOR) tablet 40 mg, 40 mg, Oral, Nightly  heparin (porcine) injection 5,000 Units, 5,000 Units, Subcutaneous, 3 times per day  famotidine (PEPCID) tablet 20 mg, 20 mg, Oral, BID  magnesium sulfate 1 g in dextrose 5% 100 mL IVPB, 1 g, Intravenous, PRN  niMODipine (NIMOTOP) capsule 60 mg, 60 mg, Oral, 6 times per day  cyclobenzaprine (FLEXERIL) tablet 10 mg, 10 mg,

## 2018-01-24 NOTE — PROGRESS NOTES
negative      OBJECTIVE:    Vitals: BP (!) 104/58   Pulse 59   Temp 98.5 °F (36.9 °C) (Oral)   Resp 18   Ht 5' 2\" (1.575 m)   Wt 136 lb (61.7 kg)   SpO2 95%   BMI 24.87 kg/m²     Last Body weight:   Wt Readings from Last 3 Encounters:   01/12/18 136 lb (61.7 kg)   11/18/17 138 lb (62.6 kg)   09/07/17 136 lb 11.2 oz (62 kg)       Intake and Output summary:     Intake/Output Summary (Last 24 hours) at 01/24/18 1120  Last data filed at 01/24/18 0410   Gross per 24 hour   Intake             2783 ml   Output                0 ml   Net             2783 ml         PHYSICAL EXAMINATION:  Vitals:    01/24/18 0026 01/24/18 0418 01/24/18 0800 01/24/18 0803   BP: 126/66 119/66  (!) 104/58   Pulse: 55 53 51 59   Resp: 11 14 18   Temp: 97.3 °F (36.3 °C) 98.4 °F (36.9 °C)  98.5 °F (36.9 °C)   TempSrc: Oral Oral  Oral   SpO2: 96% 94%  95%   Weight:       Height:         Constitutional: This is a well developed, well nourished,  44y.o. year old female who is alert, oriented, cooperative and in no apparent distress. Head:normocephalic and atraumatic. ENT:   Soto Ice. No conjunctival injections. Septum was midline, mucosa was without erythema, exudates or cobblestoning. No thrush was noted. Neck: Supple without thyromegaly. No elevated JVP. Trachea was midline. Respiratory: Chest was symmetrical without dullness to percussion. Breath sounds bilaterally were clear to auscultation. There were no wheezes, rhonchi or rales. There is no intercostal retraction or use of accessory muscles. No egophony noted. Cardiovascular: Regular without murmur, clicks, gallops or rubs. There is no left or right ventricular heave. Abdomen: Slightly rounded and soft without organomegaly. No rebound tenderness, rigidity or guarding was appreciated. Lymphatic: No lymphadenopathy. Musculoskeletal: Normal curvature of the spine. No gross muscle weakness.     Extremities:  No lower extremity edema, ulcerations, tenderness, varicosities or erythema. Muscle size, tone and strength are normal.  No involuntary movements are noted. Skin:  Warm and dry. Good color, turgor and pigmentation. No lesions or scars. No cyanosis or clubbing  Neurological/Psychiatric: The patient's general behavior, level of consciousness, thought content and emotional status is normal.            Medications:    Scheduled Meds:   magnesium sulfate  1 g Intravenous Q1H    magnesium sulfate  1 g Intravenous Once    sodium chloride flush  10 mL Intravenous 2 times per day    morphine  4 mg Intravenous Once    gabapentin  300 mg Oral TID    lidocaine 1 % injection  5 mL Intradermal Once    simvastatin  40 mg Oral Nightly    heparin (porcine)  5,000 Units Subcutaneous 3 times per day    famotidine  20 mg Oral BID    niMODipine  60 mg Oral 6 times per day    polyethylene glycol  17 g Oral Daily    nicotine  1 patch Transdermal Daily    docusate sodium  100 mg Oral Daily     Continuous Infusions:     PRN Medications: hydrALAZINE, sodium chloride flush, magnesium sulfate, cyclobenzaprine, ondansetron, magnesium hydroxide, acetaminophen, oxyCODONE **OR** oxyCODONE    Diet: DIET GENERAL; Daily Fluid Restriction: 1500 ml    Laboratory findings:    CBC:   Recent Labs      01/22/18   0608  01/23/18   0637   WBC  11.9*  7.7   HGB  12.1  11.7*   HCT  36.0*  35.4*   PLT  274  292       BMP:   Recent Labs      01/22/18   0608  01/23/18   0637  01/24/18   0715   NA  140  141  134*   K  3.7  4.3  4.9   CL  108*  109*  102   CO2  19*  19*  20   BUN  7  5*  8   CREATININE  0.50  0.58  0.60   GLUCOSE  91  87  87     Hepatic functions: No results for input(s): AST, ALT, ALB, BILITOT, ALKPHOS in the last 72 hours. INR: No results for input(s): INR in the last 72 hours. S. Lactic Acid: No results for input(s): LACTA in the last 72 hours. Cardiac enzymes:No results for input(s): CKTOTAL, CKMB, CKMBINDEX, TROPONINI in the last 72 hours.     Urinalysis:     Microbiology:    Cultures

## 2018-01-24 NOTE — PLAN OF CARE
Problem: Pain:  Goal: Pain level will decrease  Pain level will decrease    Outcome: Ongoing  Pain level assessed q shift and PRN. Patient able to state tolerable level of pain. PRN meds given per patient request. Pain reassessed after pain interventions. Will continue to monitor patient.

## 2018-01-24 NOTE — PLAN OF CARE
Problem: Pain:  Goal: Control of acute pain  Control of acute pain    Outcome: Ongoing  Patient does complain of on-going headache and given pain medication as ordered    Problem: Risk for Impaired Skin Integrity  Goal: Tissue integrity - skin and mucous membranes  Structural intactness and normal physiological function of skin and  mucous membranes.    Outcome: Ongoing  Patient able to turn per self and no new signs of skin breakdown    Problem: Falls - Risk of  Goal: Absence of falls  Outcome: Ongoing  Bed lock and in lowest position, side rails up x 2, room free from clutter, call light within reach, patient alert and oriented and remains free from falls

## 2018-01-25 LAB
ANION GAP SERPL CALCULATED.3IONS-SCNC: 13 MMOL/L (ref 9–17)
BUN BLDV-MCNC: 10 MG/DL (ref 6–20)
BUN/CREAT BLD: ABNORMAL (ref 9–20)
CALCIUM SERPL-MCNC: 9.5 MG/DL (ref 8.6–10.4)
CHLORIDE BLD-SCNC: 98 MMOL/L (ref 98–107)
CO2: 23 MMOL/L (ref 20–31)
CORTISOL COLLECTION INFO: NORMAL
CORTISOL: 2.7 UG/DL (ref 2.7–18.4)
CREAT SERPL-MCNC: 0.79 MG/DL (ref 0.5–0.9)
GFR AFRICAN AMERICAN: >60 ML/MIN
GFR NON-AFRICAN AMERICAN: >60 ML/MIN
GFR SERPL CREATININE-BSD FRML MDRD: ABNORMAL ML/MIN/{1.73_M2}
GFR SERPL CREATININE-BSD FRML MDRD: ABNORMAL ML/MIN/{1.73_M2}
GLUCOSE BLD-MCNC: 98 MG/DL (ref 70–99)
POTASSIUM SERPL-SCNC: 4.9 MMOL/L (ref 3.7–5.3)
SODIUM BLD-SCNC: 134 MMOL/L (ref 135–144)

## 2018-01-25 PROCEDURE — 80048 BASIC METABOLIC PNL TOTAL CA: CPT

## 2018-01-25 PROCEDURE — 6360000002 HC RX W HCPCS: Performed by: PSYCHIATRY & NEUROLOGY

## 2018-01-25 PROCEDURE — 36415 COLL VENOUS BLD VENIPUNCTURE: CPT

## 2018-01-25 PROCEDURE — 6370000000 HC RX 637 (ALT 250 FOR IP): Performed by: PSYCHIATRY & NEUROLOGY

## 2018-01-25 PROCEDURE — 6370000000 HC RX 637 (ALT 250 FOR IP): Performed by: EMERGENCY MEDICINE

## 2018-01-25 PROCEDURE — 6370000000 HC RX 637 (ALT 250 FOR IP): Performed by: NURSE PRACTITIONER

## 2018-01-25 PROCEDURE — 2580000003 HC RX 258: Performed by: EMERGENCY MEDICINE

## 2018-01-25 PROCEDURE — 2060000000 HC ICU INTERMEDIATE R&B

## 2018-01-25 PROCEDURE — 99232 SBSQ HOSP IP/OBS MODERATE 35: CPT | Performed by: INTERNAL MEDICINE

## 2018-01-25 PROCEDURE — 94762 N-INVAS EAR/PLS OXIMTRY CONT: CPT

## 2018-01-25 PROCEDURE — 6370000000 HC RX 637 (ALT 250 FOR IP): Performed by: NEUROLOGICAL SURGERY

## 2018-01-25 PROCEDURE — 93886 INTRACRANIAL COMPLETE STUDY: CPT

## 2018-01-25 PROCEDURE — 99406 BEHAV CHNG SMOKING 3-10 MIN: CPT

## 2018-01-25 PROCEDURE — 82533 TOTAL CORTISOL: CPT

## 2018-01-25 PROCEDURE — 99232 SBSQ HOSP IP/OBS MODERATE 35: CPT | Performed by: PSYCHIATRY & NEUROLOGY

## 2018-01-25 PROCEDURE — 6370000000 HC RX 637 (ALT 250 FOR IP): Performed by: STUDENT IN AN ORGANIZED HEALTH CARE EDUCATION/TRAINING PROGRAM

## 2018-01-25 RX ORDER — LEVOTHYROXINE SODIUM 0.03 MG/1
25 TABLET ORAL DAILY
Status: DISCONTINUED | OUTPATIENT
Start: 2018-01-25 | End: 2018-01-26 | Stop reason: HOSPADM

## 2018-01-25 RX ADMIN — OXYCODONE HYDROCHLORIDE 10 MG: 5 TABLET ORAL at 05:59

## 2018-01-25 RX ADMIN — Medication 10 ML: at 09:52

## 2018-01-25 RX ADMIN — HEPARIN SODIUM 5000 UNITS: 5000 INJECTION, SOLUTION INTRAVENOUS; SUBCUTANEOUS at 05:58

## 2018-01-25 RX ADMIN — FAMOTIDINE 20 MG: 20 TABLET, FILM COATED ORAL at 20:34

## 2018-01-25 RX ADMIN — SIMVASTATIN 40 MG: 40 TABLET, FILM COATED ORAL at 20:35

## 2018-01-25 RX ADMIN — HEPARIN SODIUM 5000 UNITS: 5000 INJECTION, SOLUTION INTRAVENOUS; SUBCUTANEOUS at 20:34

## 2018-01-25 RX ADMIN — ACETAMINOPHEN 650 MG: 325 TABLET ORAL at 12:41

## 2018-01-25 RX ADMIN — OXYCODONE HYDROCHLORIDE 5 MG: 5 TABLET ORAL at 10:05

## 2018-01-25 RX ADMIN — NIMODIPINE 60 MG: 30 CAPSULE, LIQUID FILLED ORAL at 04:05

## 2018-01-25 RX ADMIN — MYCOPHENOLATE MOFETIL 300 MG: 500 TABLET ORAL at 20:34

## 2018-01-25 RX ADMIN — Medication 10 ML: at 20:36

## 2018-01-25 RX ADMIN — DOCUSATE SODIUM 100 MG: 100 CAPSULE, LIQUID FILLED ORAL at 09:51

## 2018-01-25 RX ADMIN — FAMOTIDINE 20 MG: 20 TABLET, FILM COATED ORAL at 10:03

## 2018-01-25 RX ADMIN — OXYCODONE HYDROCHLORIDE 5 MG: 5 TABLET ORAL at 14:02

## 2018-01-25 RX ADMIN — OXYCODONE HYDROCHLORIDE 10 MG: 5 TABLET ORAL at 19:04

## 2018-01-25 RX ADMIN — NIMODIPINE 60 MG: 30 CAPSULE, LIQUID FILLED ORAL at 12:36

## 2018-01-25 RX ADMIN — ACETAMINOPHEN 650 MG: 325 TABLET ORAL at 04:05

## 2018-01-25 RX ADMIN — HEPARIN SODIUM 5000 UNITS: 5000 INJECTION, SOLUTION INTRAVENOUS; SUBCUTANEOUS at 14:32

## 2018-01-25 RX ADMIN — MYCOPHENOLATE MOFETIL 300 MG: 500 TABLET ORAL at 14:33

## 2018-01-25 RX ADMIN — NIMODIPINE 60 MG: 30 CAPSULE, LIQUID FILLED ORAL at 01:30

## 2018-01-25 RX ADMIN — LEVOTHYROXINE SODIUM 25 MCG: 25 TABLET ORAL at 14:38

## 2018-01-25 RX ADMIN — NIMODIPINE 60 MG: 30 CAPSULE, LIQUID FILLED ORAL at 15:55

## 2018-01-25 RX ADMIN — MYCOPHENOLATE MOFETIL 300 MG: 500 TABLET ORAL at 09:53

## 2018-01-25 RX ADMIN — OXYCODONE HYDROCHLORIDE 10 MG: 5 TABLET ORAL at 23:12

## 2018-01-25 RX ADMIN — OXYCODONE HYDROCHLORIDE 5 MG: 5 TABLET ORAL at 10:17

## 2018-01-25 RX ADMIN — NIMODIPINE 60 MG: 30 CAPSULE, LIQUID FILLED ORAL at 20:33

## 2018-01-25 RX ADMIN — NIMODIPINE 60 MG: 30 CAPSULE, LIQUID FILLED ORAL at 09:49

## 2018-01-25 RX ADMIN — OXYCODONE HYDROCHLORIDE 10 MG: 5 TABLET ORAL at 01:31

## 2018-01-25 ASSESSMENT — PAIN DESCRIPTION - LOCATION: LOCATION: HEAD

## 2018-01-25 ASSESSMENT — PAIN SCALES - GENERAL
PAINLEVEL_OUTOF10: 3
PAINLEVEL_OUTOF10: 4
PAINLEVEL_OUTOF10: 4
PAINLEVEL_OUTOF10: 7
PAINLEVEL_OUTOF10: 7
PAINLEVEL_OUTOF10: 2
PAINLEVEL_OUTOF10: 7
PAINLEVEL_OUTOF10: 7
PAINLEVEL_OUTOF10: 5
PAINLEVEL_OUTOF10: 4
PAINLEVEL_OUTOF10: 5
PAINLEVEL_OUTOF10: 7
PAINLEVEL_OUTOF10: 6
PAINLEVEL_OUTOF10: 6
PAINLEVEL_OUTOF10: 2

## 2018-01-25 ASSESSMENT — PAIN DESCRIPTION - PROGRESSION
CLINICAL_PROGRESSION: NOT CHANGED
CLINICAL_PROGRESSION: NOT CHANGED

## 2018-01-25 ASSESSMENT — PAIN DESCRIPTION - ONSET: ONSET: ON-GOING

## 2018-01-25 ASSESSMENT — PAIN DESCRIPTION - DIRECTION: RADIATING_TOWARDS: EYES

## 2018-01-25 ASSESSMENT — PAIN DESCRIPTION - PAIN TYPE: TYPE: ACUTE PAIN

## 2018-01-25 ASSESSMENT — PAIN DESCRIPTION - FREQUENCY: FREQUENCY: CONTINUOUS

## 2018-01-25 ASSESSMENT — PAIN DESCRIPTION - DESCRIPTORS: DESCRIPTORS: HEADACHE

## 2018-01-25 NOTE — PLAN OF CARE
Problem: Pain:  Goal: Control of acute pain  Control of acute pain    Outcome: Met This Shift      Problem: Risk for Impaired Skin Integrity  Goal: Tissue integrity - skin and mucous membranes  Structural intactness and normal physiological function of skin and  mucous membranes.    Outcome: Met This Shift      Problem: Falls - Risk of  Goal: Absence of falls  Outcome: Met This Shift

## 2018-01-25 NOTE — PROGRESS NOTES
Department of Endovascular Neurosurgery  Fellow Progress Note      SUBJECTIVE:    No acute events. OBJECTIVE    Physical  VITALS:  BP (!) 100/56   Pulse 76   Temp 98.6 °F (37 °C) (Oral)   Resp 12   Ht 5' 2\" (1.575 m)   Wt 136 lb (61.7 kg)   SpO2 97%   BMI 24.87 kg/m²      NEUROLOGIC:    Alert,awake, oriented to name, place and time. Cranial nerves II-XII are grossly intact. Motor is 5 out of 5 bilaterally. Sensory: intact to LT in 4 extremities.      Data  CBC:   Lab Results   Component Value Date    WBC 7.7 01/23/2018    RBC 3.70 01/23/2018    HGB 11.7 01/23/2018    HCT 35.4 01/23/2018    MCV 95.7 01/23/2018    MCH 31.6 01/23/2018    MCHC 33.1 01/23/2018    RDW 13.2 01/23/2018     01/23/2018    MPV 9.1 01/23/2018     CMP:    Lab Results   Component Value Date     01/24/2018    K 4.8 01/24/2018     01/24/2018    CO2 25 01/24/2018    BUN 9 01/24/2018    CREATININE 0.75 01/24/2018    GFRAA >60 01/24/2018    LABGLOM >60 01/24/2018    GLUCOSE 110 01/24/2018    CALCIUM 8.7 01/24/2018     Current Inpatient Medications  Current Facility-Administered Medications: magnesium sulfate 1 g in dextrose 5% 100 mL IVPB, 1 g, Intravenous, Once  hydrALAZINE (APRESOLINE) injection 5 mg, 5 mg, Intravenous, Q4H PRN  sodium chloride flush 0.9 % injection 10 mL, 10 mL, Intravenous, 2 times per day  sodium chloride flush 0.9 % injection 10 mL, 10 mL, Intravenous, PRN  morphine (PF) injection 4 mg, 4 mg, Intravenous, Once  gabapentin (NEURONTIN) capsule 300 mg, 300 mg, Oral, TID  lidocaine 1 % injection 5 mL, 5 mL, Intradermal, Once  simvastatin (ZOCOR) tablet 40 mg, 40 mg, Oral, Nightly  heparin (porcine) injection 5,000 Units, 5,000 Units, Subcutaneous, 3 times per day  famotidine (PEPCID) tablet 20 mg, 20 mg, Oral, BID  magnesium sulfate 1 g in dextrose 5% 100 mL IVPB, 1 g, Intravenous, PRN  niMODipine (NIMOTOP) capsule 60 mg, 60 mg, Oral, 6 times per day  cyclobenzaprine (FLEXERIL) tablet 10 mg, 10

## 2018-01-25 NOTE — PROGRESS NOTES
Internal Medicine - Daily Progress Note      Date and time: 1/25/2018 12:06 PM  Patient's name:  Trini Le  Medical Record Number: [id number]  Patient's account/billing number: [de-identified]  Patient's YOB: 1975  Age: 44 y.o. Date of Admission: 1/12/2018  2:47 PM      Primary Care Physician: Sameer Melo MD  Attending Physician:    Code Status: Full Code    Chief complaint:   [facility]  Problem List:   Patient Active Problem List   Diagnosis    Tobacco use    Genital warts    Migraine    [facility] [address]    Aneurysmal subarachnoid hemorrhage (HCC)    Generalized headache    Ruptured cerebral aneurysm ([facility] [address]    Middle cerebral artery aneurysm    Cerebral salt-wasting    Hydrocephalus       Allergies: Allergies   Allergen Reactions    Prozac [Fluoxetine Hcl]      cutting    Chantix [Varenicline] Nausea And Vomiting     vomiting          SUBJECTIVE:     Patient is seen and examined no acute events overnight  Upon a treatment corrected serum sodium is 137 now  Patient is complaining of tinnitus/   He had history of tinnitus with aspect patient is getting worse. Eating and drinking okay as well as ambulating well.        Review of Systems -   General ROS: Completed and except as mentioned above were negative   Psychological ROS:  Completed and except as mentioned above were negative  Allergy and Immunology ROS:  Completed and except as mentioned above were negative  Hematological and Lymphatic ROS:  Completed and except as mentioned above were negative  Respiratory ROS:  Completed and except as mentioned above were negative  Cardiovascular ROS:  Completed and except as mentioned above were negative  Gastrointestinal ROS: Completed and except as mentioned above were negative  Genito-Urinary ROS:  Completed and except as mentioned above were negative  Musculoskeletal ROS:  Completed and except as mentioned above were negative  Neurological ROS:  Completed and except as mentioned above were negative  Dermatological ROS:  Completed and except as mentioned above were negative      OBJECTIVE:    Vitals: /72   Pulse 68   Temp 98.4 °F (36.9 °C) (Oral)   Resp 16   Ht 5' 2\" (1.575 m)   Wt 136 lb (61.7 kg)   SpO2 97%   BMI 24.87 kg/m²     Last Body weight:   Wt Readings from Last 3 Encounters:   01/12/18 136 lb (61.7 kg)   11/18/17 138 lb (62.6 kg)   09/07/17 136 lb 11.2 oz (62 kg)       Intake and Output summary:     Intake/Output Summary (Last 24 hours) at 01/25/18 1206  Last data filed at 01/25/18 1044   Gross per 24 hour   Intake              510 ml   Output                0 ml   Net              510 ml         PHYSICAL EXAMINATION:  Vitals:    01/24/18 1925 01/25/18 0025 01/25/18 0425 01/25/18 0830   BP: 103/76 101/68 (!) 100/56 121/72   Pulse: 90 69 76 68   Resp: 15 15 12 16   Temp: 98.5 °F (36.9 °C) 98.1 °F (36.7 °C) 98.6 °F (37 °C) 98.4 °F (36.9 °C)   TempSrc: Oral Oral Oral Oral   SpO2: 97% 95% 97%    Weight:       Height:         Constitutional: This is a well developed, well nourished,  44y.o. year old female who is alert, oriented, cooperative and in no apparent distress. Head:normocephalic and atraumatic. ENT:   Soto Ice. No conjunctival injections. Septum was midline, mucosa was without erythema, exudates or cobblestoning. No thrush was noted. Neck: Supple without thyromegaly. No elevated JVP. Trachea was midline. Respiratory: Chest was symmetrical without dullness to percussion. Breath sounds bilaterally were clear to auscultation. There were no wheezes, rhonchi or rales. There is no intercostal retraction or use of accessory muscles. No egophony noted. Cardiovascular: Regular without murmur, clicks, gallops or rubs. There is no left or right ventricular heave. Abdomen: Slightly rounded and soft without organomegaly. No rebound tenderness, rigidity or guarding was appreciated. Lymphatic: No lymphadenopathy.   Musculoskeletal: Normal curvature of

## 2018-01-25 NOTE — PROGRESS NOTES
Smoking Cessation - topics covered   [x]  Health Risks  [x]  Benefits of Quitting   [x]  Smoking Cessation  []  Patient has no history of tobacco use  []  Patient is former smoker. []  No need for tobacco cessation education. [x]  Booklet given  [x]  Patient verbalizes understanding. []  Patient denies need for tobacco cessation education.   Samule Koyanagi  3:20 PM

## 2018-01-26 VITALS
WEIGHT: 136 LBS | BODY MASS INDEX: 25.03 KG/M2 | RESPIRATION RATE: 17 BRPM | SYSTOLIC BLOOD PRESSURE: 105 MMHG | OXYGEN SATURATION: 96 % | HEIGHT: 62 IN | HEART RATE: 81 BPM | TEMPERATURE: 97.2 F | DIASTOLIC BLOOD PRESSURE: 70 MMHG

## 2018-01-26 LAB
ANION GAP SERPL CALCULATED.3IONS-SCNC: 18 MMOL/L (ref 9–17)
BUN BLDV-MCNC: 13 MG/DL (ref 6–20)
BUN/CREAT BLD: ABNORMAL (ref 9–20)
CALCIUM SERPL-MCNC: 9.6 MG/DL (ref 8.6–10.4)
CHLORIDE BLD-SCNC: 96 MMOL/L (ref 98–107)
CO2: 20 MMOL/L (ref 20–31)
CREAT SERPL-MCNC: 0.63 MG/DL (ref 0.5–0.9)
GFR AFRICAN AMERICAN: >60 ML/MIN
GFR NON-AFRICAN AMERICAN: >60 ML/MIN
GFR SERPL CREATININE-BSD FRML MDRD: ABNORMAL ML/MIN/{1.73_M2}
GFR SERPL CREATININE-BSD FRML MDRD: ABNORMAL ML/MIN/{1.73_M2}
GLUCOSE BLD-MCNC: 103 MG/DL (ref 70–99)
POTASSIUM SERPL-SCNC: 4.7 MMOL/L (ref 3.7–5.3)
SODIUM BLD-SCNC: 134 MMOL/L (ref 135–144)

## 2018-01-26 PROCEDURE — 6370000000 HC RX 637 (ALT 250 FOR IP): Performed by: PSYCHIATRY & NEUROLOGY

## 2018-01-26 PROCEDURE — 6360000002 HC RX W HCPCS: Performed by: PSYCHIATRY & NEUROLOGY

## 2018-01-26 PROCEDURE — 80048 BASIC METABOLIC PNL TOTAL CA: CPT

## 2018-01-26 PROCEDURE — 6370000000 HC RX 637 (ALT 250 FOR IP): Performed by: NURSE PRACTITIONER

## 2018-01-26 PROCEDURE — 93886 INTRACRANIAL COMPLETE STUDY: CPT | Performed by: PSYCHIATRY & NEUROLOGY

## 2018-01-26 PROCEDURE — 93886 INTRACRANIAL COMPLETE STUDY: CPT

## 2018-01-26 PROCEDURE — 6370000000 HC RX 637 (ALT 250 FOR IP): Performed by: STUDENT IN AN ORGANIZED HEALTH CARE EDUCATION/TRAINING PROGRAM

## 2018-01-26 PROCEDURE — 2580000003 HC RX 258: Performed by: EMERGENCY MEDICINE

## 2018-01-26 PROCEDURE — 36415 COLL VENOUS BLD VENIPUNCTURE: CPT

## 2018-01-26 PROCEDURE — 99232 SBSQ HOSP IP/OBS MODERATE 35: CPT | Performed by: INTERNAL MEDICINE

## 2018-01-26 PROCEDURE — 94762 N-INVAS EAR/PLS OXIMTRY CONT: CPT

## 2018-01-26 PROCEDURE — 99232 SBSQ HOSP IP/OBS MODERATE 35: CPT | Performed by: PSYCHIATRY & NEUROLOGY

## 2018-01-26 PROCEDURE — 6370000000 HC RX 637 (ALT 250 FOR IP): Performed by: EMERGENCY MEDICINE

## 2018-01-26 RX ORDER — LEVOTHYROXINE SODIUM 0.03 MG/1
25 TABLET ORAL DAILY
Qty: 30 TABLET | Refills: 3 | Status: SHIPPED | OUTPATIENT
Start: 2018-01-27 | End: 2018-04-06 | Stop reason: SDUPTHER

## 2018-01-26 RX ORDER — NIMODIPINE 30 MG/1
60 CAPSULE, LIQUID FILLED ORAL EVERY 4 HOURS
Qty: 132 CAPSULE | Refills: 0 | Status: SHIPPED | OUTPATIENT
Start: 2018-01-27 | End: 2018-01-26

## 2018-01-26 RX ORDER — SIMVASTATIN 40 MG
40 TABLET ORAL NIGHTLY
Qty: 30 TABLET | Refills: 3 | Status: SHIPPED | OUTPATIENT
Start: 2018-01-26 | End: 2018-04-06 | Stop reason: SDUPTHER

## 2018-01-26 RX ORDER — NIMODIPINE 30 MG/1
60 CAPSULE, LIQUID FILLED ORAL EVERY 4 HOURS
Qty: 132 CAPSULE | Refills: 0 | Status: SHIPPED | OUTPATIENT
Start: 2018-01-27 | End: 2018-03-02 | Stop reason: ALTCHOICE

## 2018-01-26 RX ORDER — ACETAMINOPHEN 325 MG/1
650 TABLET ORAL EVERY 6 HOURS PRN
Qty: 120 TABLET | Refills: 3 | Status: SHIPPED | OUTPATIENT
Start: 2018-01-26

## 2018-01-26 RX ADMIN — HEPARIN SODIUM 5000 UNITS: 5000 INJECTION, SOLUTION INTRAVENOUS; SUBCUTANEOUS at 06:38

## 2018-01-26 RX ADMIN — LEVOTHYROXINE SODIUM 25 MCG: 25 TABLET ORAL at 10:01

## 2018-01-26 RX ADMIN — NIMODIPINE 60 MG: 30 CAPSULE, LIQUID FILLED ORAL at 10:01

## 2018-01-26 RX ADMIN — NIMODIPINE 60 MG: 30 CAPSULE, LIQUID FILLED ORAL at 00:00

## 2018-01-26 RX ADMIN — NIMODIPINE 60 MG: 30 CAPSULE, LIQUID FILLED ORAL at 13:34

## 2018-01-26 RX ADMIN — OXYCODONE HYDROCHLORIDE 10 MG: 5 TABLET ORAL at 03:40

## 2018-01-26 RX ADMIN — FAMOTIDINE 20 MG: 20 TABLET, FILM COATED ORAL at 10:02

## 2018-01-26 RX ADMIN — MYCOPHENOLATE MOFETIL 300 MG: 500 TABLET ORAL at 10:02

## 2018-01-26 RX ADMIN — MYCOPHENOLATE MOFETIL 300 MG: 500 TABLET ORAL at 18:18

## 2018-01-26 RX ADMIN — DOCUSATE SODIUM 100 MG: 100 CAPSULE, LIQUID FILLED ORAL at 10:02

## 2018-01-26 RX ADMIN — NIMODIPINE 60 MG: 30 CAPSULE, LIQUID FILLED ORAL at 04:44

## 2018-01-26 RX ADMIN — NIMODIPINE 60 MG: 30 CAPSULE, LIQUID FILLED ORAL at 18:19

## 2018-01-26 RX ADMIN — ACETAMINOPHEN 650 MG: 325 TABLET ORAL at 04:43

## 2018-01-26 RX ADMIN — Medication 10 ML: at 10:16

## 2018-01-26 ASSESSMENT — PAIN DESCRIPTION - LOCATION
LOCATION: HEAD
LOCATION: HEAD

## 2018-01-26 ASSESSMENT — PAIN DESCRIPTION - PAIN TYPE
TYPE: ACUTE PAIN
TYPE: ACUTE PAIN

## 2018-01-26 ASSESSMENT — PAIN SCALES - GENERAL
PAINLEVEL_OUTOF10: 4
PAINLEVEL_OUTOF10: 7
PAINLEVEL_OUTOF10: 3

## 2018-01-26 NOTE — PROGRESS NOTES
times per day  cyclobenzaprine (FLEXERIL) tablet 10 mg, 10 mg, Oral, TID PRN  polyethylene glycol (GLYCOLAX) packet 17 g, 17 g, Oral, Daily  ondansetron (ZOFRAN) injection 4 mg, 4 mg, Intravenous, Q4H PRN  nicotine (NICODERM CQ) 14 MG/24HR 1 patch, 1 patch, Transdermal, Daily  docusate sodium (COLACE) capsule 100 mg, 100 mg, Oral, Daily  magnesium hydroxide (MILK OF MAGNESIA) 400 MG/5ML suspension 30 mL, 30 mL, Oral, Daily PRN  acetaminophen (TYLENOL) tablet 650 mg, 650 mg, Oral, Q4H PRN  oxyCODONE (ROXICODONE) immediate release tablet 5 mg, 5 mg, Oral, Q4H PRN **OR** oxyCODONE (ROXICODONE) immediate release tablet 10 mg, 10 mg, Oral, Q4H PRN    ASSESSMENT AND PLAN  45 yo woman presenting with SAH 2/2 ruptured right MCA bifurcation aneurysm on 1/12/2018, HH 2, mF 1.    -->sbp to < 220mmHg  -->Goal normonatremia/normothermia/normovolemia.   -->Monitor sodium level.   -->Daily TCDs: reviewed yesterday's.   -->Patient can be discharged home from our prospective.  -->Continue nimodipine for a total of 21 days.  -->Work release for 2 weeks was signed for the patient.   -->Patient encouraged to drink enough fluids and electrolytes to keep her sodium normal(sodium today 134)  -->Patient to follow up in the fellow neuro endovascular clinic as an outpatient with Dr Bernetta Homans on 2/9 and with Dr Adeline Talley on 3/27 with pre clinic MRA head with contrast. Patient will need a conventional cerebral angiogram in 6 months. Discussed with Dr Kingsley Stokes.   Jewels Grossman MD  Neuroendovascular Fellow  Stroke, [facility]  [address]

## 2018-01-26 NOTE — PROGRESS NOTES
Internal Medicine - Daily Progress Note      Date and time: 1/26/2018 1:11 PM  Patient's name:  Trini Le  Medical Record Number: [id number]  Patient's account/billing number: [de-identified]  Patient's YOB: 1975  Age: 44 y.o. Date of Admission: 1/12/2018  2:47 PM      Primary Care Physician: Sameer Melo MD  Attending Physician:    Code Status: Full Code    Chief complaint:   [address]  Problem List:   Patient Active Problem List   Diagnosis    Tobacco use    Genital warts    Migraine    [facility] [address]    Aneurysmal subarachnoid hemorrhage (HCC)    Generalized headache    Ruptured [facility] aneurysm ([facility] [address]    Middle cerebral artery aneurysm    Cerebral salt-wasting    Hydrocephalus       Allergies: Allergies   Allergen Reactions    Prozac [Fluoxetine Hcl]      cutting    Chantix [Varenicline] Nausea And Vomiting     vomiting          SUBJECTIVE:     Patient is seen and examined. No acute events overnight. Sodium is 134 today and stable. Still has some headache and Tinitus.    Cortisol level normal.       Review of Systems -   General ROS: Completed and except as mentioned above were negative   Psychological ROS:  Completed and except as mentioned above were negative  Allergy and Immunology ROS:  Completed and except as mentioned above were negative  Hematological and Lymphatic ROS:  Completed and except as mentioned above were negative  Respiratory ROS:  Completed and except as mentioned above were negative  Cardiovascular ROS:  Completed and except as mentioned above were negative  Gastrointestinal ROS: Completed and except as mentioned above were negative  Genito-Urinary ROS:  Completed and except as mentioned above were negative  Musculoskeletal ROS:  Completed and except as mentioned above were negative  Neurological ROS:  Completed and except as mentioned above were negative  Dermatological ROS:  Completed and except as mentioned above were cultures:      [] None drawn      [] Negative             []  Positive (Details:  )  Urine Culture:        [] None drawn      [] Negative             []  Positive (Details:  )  Sputum Culture:   [] None drawn       [] Negative             []  Positive (Details:  )     ASSESSMENT:      Active Problems:    SAH (subarachnoid hemorrhage) (HCC)    Aneurysmal subarachnoid hemorrhage (HCC)    Generalized headache    Ruptured cerebral aneurysm (HCC)    Middle cerebral artery aneurysm          PLAN:    1. Subarachnoid hemorrhage:   Primary coil embolization of right MCA bifurcation aneurysm. S/P LP on 1/19 due to concern for increased ICP due to persistent HA post coiling; On day pain 60 mg 6 times a day  Continue Neurontin to 300mg TID. Pain control, roxicodone 5-10mg Q4hPRN, flexeril 10mg TIDPRN. Monitor sodium as there is high risk for cerebral salt wasting. We will put fluid restriction to 1500 mL per day     Neuro endovascular wants to observe patient till Friday. So patient will not be discharged till Friday. 2. Hyponatremia:  137--> 134-->134. Cortisol levels normal.      3. Hypothyroidism:  Restart levothyroxine 25 µg and will recheck TSH OP. Could be the cause of hyponatremia. DC planning: Home with assistance from significant others.      Patient will be discharged today.          Eddy Malhotra MD  Internal Medicine [address], [facility]  PGY-1

## 2018-01-26 NOTE — DISCHARGE SUMMARY
[address] [place] [address]     Department of Internal Medicine - Staff Internal Medicine Service    INPATIENT DISCHARGE SUMMARY      PATIENT IDENTIFICATION:  NAME:  Zachariah Mathur   :   1975  MRN:    [id number]     Acct:    [de-identified]   [facility] Date:  2018  Discharge date:  2018  7:30 PM   Attending Provider: No att. providers found                                     REASON FOR HOSPITALIZATION:   Zachariah Mathur is a 44 y.o. female who presented with [address] DIAGNOSES:  Primary:   SAH (subarachnoid hemorrhage) ([facility] [address] [I60.9]    Secondary: Active Hospital Problems    Diagnosis Date Noted    Generalized headache [R51]     Ruptured cerebral aneurysm (HCC) [I60.9]     Middle cerebral artery aneurysm [I67.1]     Aneurysmal subarachnoid hemorrhage (HCC) [I60.8]     SAH (subarachnoid hemorrhage) ([facility] [address] [I60.9] 2018       TREATMENT:  Brief Inpatient Course:   56-year-old female without any significant past medical history she presented to [facility] with complaint of severe headache started on   around 10:45 AM.   The scan of head was done which showed subarachnoid hemorrhage and patient was transferred to [facility] for further management.     CT angiogram was done and which showed initially some of her right middle cerebral artery bifurcation and successful coiling was done. After the procedure patient was started on the nimotop  60 mg every 4 hrs to prevent vasospasm and she was started on Keppra loading dose of 1 g and then 500 mg twice a day to prevent seizures. Repeat CT was the next day which showed stable subarachnoid hemorrhage without any new source of bleeding      even after the procedure patient was complaining of headache likely related to vasospasm H was confirmed by transcranial Doppler ultrasound. And it was treated with Decadron, magnesium, , fentanyl, Tylenol, Roxicodone  Lumbar puncture was done on the  to relieve the headache.   Opening pressure was 35 and 24 ml of fluid was drained. Closing pressure was 13. Patient was on Nimotop to relieve vasospasm. She was complaining of headache and Tylenol was helping with that. Patient's sodium was fluctuating. Patient was put on fluid restriction. Patient's TSH was elevated and T3 was low so was started on Levothyroxine 25 mcg and was Discharged. Procedures:  Aneurysmal clipping     Any Hospital Acquired Infections: None  Discharge date 01/26/2017  PATIENT'S DISCHARGE CONDITION:  Stable    PATIENT/FAMILY INSTRUCTIONS:   Current Discharge Medication List      START taking these medications    Details   levothyroxine (SYNTHROID) 25 MCG tablet Take 1 tablet by mouth Daily  Qty: 30 tablet, Refills: 3      acetaminophen (TYLENOL) 325 MG tablet Take 2 tablets by mouth every 6 hours as needed for Pain  Qty: 120 tablet, Refills: 3      niMODipine (NIMOTOP) 30 MG capsule Take 2 capsules by mouth every 4 hours for 11 days  Qty: 132 capsule, Refills: 0      simvastatin (ZOCOR) 40 MG tablet Take 1 tablet by mouth nightly  Qty: 30 tablet, Refills: 3         CONTINUE these medications which have NOT CHANGED    Details   albuterol sulfate HFA (VENTOLIN HFA) 108 (90 BASE) MCG/ACT inhaler Inhale 2 puffs into the lungs every 4 hours as needed for Wheezing or Shortness of Breath  Qty: 1 Inhaler, Refills: 5      ipratropium (ATROVENT) 0.06 % nasal spray 2 sprays by Nasal route 4 times daily as needed for Rhinitis  Qty: 1 Bottle, Refills: 0         STOP taking these medications       naproxen sodium (ANAPROX) 220 MG tablet Comments:   Reason for Stopping:             Activity: activity as tolerated    Diet: regular diet    Disposition: home    Follow-up:  in 7 days with MD Shadia Olsen MD  [address] [facility]   [facility] [place] [address] [facility] [address] [facility]          Arcelia Zhang MD  [address]

## 2018-01-29 ENCOUNTER — TELEPHONE (OUTPATIENT)
Dept: FAMILY MEDICINE CLINIC | Age: 43
End: 2018-01-29

## 2018-01-29 NOTE — TELEPHONE ENCOUNTER
Pt was discharged from [facility] on 1/26/18 she is scheduled for f/u visit on 2/2/18 needs tcm

## 2018-02-02 ENCOUNTER — OFFICE VISIT (OUTPATIENT)
Dept: FAMILY MEDICINE CLINIC | Age: 43
End: 2018-02-02
Payer: COMMERCIAL

## 2018-02-02 VITALS
SYSTOLIC BLOOD PRESSURE: 120 MMHG | HEART RATE: 56 BPM | BODY MASS INDEX: 24.51 KG/M2 | WEIGHT: 134 LBS | DIASTOLIC BLOOD PRESSURE: 84 MMHG | RESPIRATION RATE: 10 BRPM

## 2018-02-02 DIAGNOSIS — I60.9 SAH (SUBARACHNOID HEMORRHAGE) (HCC): ICD-10-CM

## 2018-02-02 DIAGNOSIS — E87.1 HYPONATREMIA: ICD-10-CM

## 2018-02-02 DIAGNOSIS — E78.5 DYSLIPIDEMIA: ICD-10-CM

## 2018-02-02 DIAGNOSIS — I67.1 MIDDLE CEREBRAL ARTERY ANEURYSM: ICD-10-CM

## 2018-02-02 DIAGNOSIS — Z72.0 TOBACCO USE: ICD-10-CM

## 2018-02-02 DIAGNOSIS — E03.9 HYPOTHYROIDISM, UNSPECIFIED TYPE: ICD-10-CM

## 2018-02-02 DIAGNOSIS — I60.8 ANEURYSMAL SUBARACHNOID HEMORRHAGE (HCC): Primary | ICD-10-CM

## 2018-02-02 PROCEDURE — 1111F DSCHRG MED/CURRENT MED MERGE: CPT | Performed by: FAMILY MEDICINE

## 2018-02-02 PROCEDURE — 99214 OFFICE O/P EST MOD 30 MIN: CPT | Performed by: FAMILY MEDICINE

## 2018-02-02 NOTE — PROGRESS NOTES
stopped after birth of first daughter in [address] Patient in clinical research study 01/12/2018    TARGET: Consented 01/12/2018, Expected Completion Date 06/12/2018    Recurrent sinus infections 05/24/2017    Ruptured middle cerebral artery aneurysm ([facility] [address] 01/12/2018    Right MCA    SAH (subarachnoid hemorrhage) ([facility] [address] 01/12/2018    Tobacco use 1991      Past Surgical History:   Procedure Laterality Date    BRAIN ANEURYSM SURGERY  01/12/2018    coil ablation ; MCA     OTHER SURGICAL HISTORY  04/02/2013    Pelvic Ablasion    TUBAL LIGATION  05/24/2011     Family History   Problem Relation Age of Onset    Other Mother      suicide, uterine fibroids    Alcohol Abuse Father     Other Father      brain aneurysm    Cancer Sister      thyroid    Breast Cancer Paternal Grandmother      Social History   Substance Use Topics    Smoking status: Former Smoker     Packs/day: 1.00     Start date: 13     Quit date: 1/12/2018    Smokeless tobacco: Never Used    Alcohol use No        Current Outpatient Prescriptions   Medication Sig Dispense Refill    levothyroxine (SYNTHROID) 25 MCG tablet Take 1 tablet by mouth Daily 30 tablet 3    acetaminophen (TYLENOL) 325 MG tablet Take 2 tablets by mouth every 6 hours as needed for Pain 120 tablet 3    simvastatin (ZOCOR) 40 MG tablet Take 1 tablet by mouth nightly 30 tablet 3    niMODipine (NIMOTOP) 30 MG capsule Take 2 capsules by mouth every 4 hours for 11 days 132 capsule 0    ipratropium (ATROVENT) 0.06 % nasal spray 2 sprays by Nasal route 4 times daily as needed for Rhinitis 1 Bottle 0    albuterol sulfate HFA (VENTOLIN HFA) 108 (90 BASE) MCG/ACT inhaler Inhale 2 puffs into the lungs every 4 hours as needed for Wheezing or Shortness of Breath 1 Inhaler 5     No current facility-administered medications for this visit.       Allergies   Allergen Reactions    Prozac [Fluoxetine Hcl]      cutting    Chantix [Varenicline] Nausea And Vomiting     vomiting       Health Maintenance   Topic Date Due    HIV screen  01/05/1991    Pneumococcal med risk (1 of 1 - PPSV23) 01/05/1995    Flu vaccine (1) 09/01/2017    Cervical cancer screen  01/25/2019    Lipid screen  01/13/2023    DTaP/Tdap/Td vaccine (2 - Td) 12/03/2023       Subjective:      Review of Systems   Constitutional: Negative for chills and fever. HENT: Negative for congestion, ear discharge, ear pain, rhinorrhea and sore throat. Respiratory: Negative for cough, shortness of breath and wheezing. Cardiovascular: Negative for chest pain and palpitations. Gastrointestinal: Negative for abdominal pain. Genitourinary: Negative for dysuria. Neurological: Negative for dizziness, speech difficulty, weakness, numbness and headaches. Objective:     /84 (Site: Left Arm, Position: Sitting, Cuff Size: Large Adult)   Pulse 56   Resp 10   Wt 134 lb (60.8 kg)   BMI 24.51 kg/m²     Physical Exam   Constitutional: She appears well-developed and well-nourished. HENT:   Head: Normocephalic. Right Ear: Tympanic membrane normal.   Left Ear: Tympanic membrane normal.   Nose: Nose normal.   Mouth/Throat: No oropharyngeal exudate or posterior oropharyngeal erythema. Eyes: EOM are normal. Pupils are equal, round, and reactive to light. Neck: Neck supple. Carotid bruit is not present. No Brudzinski's sign and no Kernig's sign noted. No thyromegaly present. Cardiovascular: Normal rate, regular rhythm, S1 normal and S2 normal.    No murmur heard. Pulmonary/Chest: Breath sounds normal. She has no wheezes. She has no rhonchi. She has no rales. She exhibits no tenderness. Abdominal: Soft. There is no hepatosplenomegaly. There is no tenderness. Musculoskeletal: She exhibits no edema. Lymphadenopathy:     She has no cervical adenopathy. She has no axillary adenopathy. Neurological: No cranial nerve deficit. Reflex Scores:       Bicep reflexes are 2+ on the right side and 2+ on the left side.

## 2018-02-06 PROBLEM — E03.9 HYPOTHYROIDISM: Status: ACTIVE | Noted: 2018-02-06

## 2018-02-06 PROBLEM — E78.5 DYSLIPIDEMIA: Status: ACTIVE | Noted: 2018-02-06

## 2018-02-06 ASSESSMENT — ENCOUNTER SYMPTOMS
SORE THROAT: 0
SHORTNESS OF BREATH: 0
COUGH: 0
RHINORRHEA: 0
WHEEZING: 0
ABDOMINAL PAIN: 0

## 2018-03-02 ENCOUNTER — OFFICE VISIT (OUTPATIENT)
Dept: NEUROLOGY | Age: 43
End: 2018-03-02
Payer: COMMERCIAL

## 2018-03-02 VITALS
SYSTOLIC BLOOD PRESSURE: 132 MMHG | DIASTOLIC BLOOD PRESSURE: 86 MMHG | BODY MASS INDEX: 25.03 KG/M2 | WEIGHT: 136 LBS | HEART RATE: 83 BPM | HEIGHT: 62 IN

## 2018-03-02 DIAGNOSIS — I60.7 CEREBRAL ANEURYSM RUPTURE (HCC): Primary | ICD-10-CM

## 2018-03-02 PROCEDURE — 99213 OFFICE O/P EST LOW 20 MIN: CPT | Performed by: PSYCHIATRY & NEUROLOGY

## 2018-03-16 ENCOUNTER — OFFICE VISIT (OUTPATIENT)
Dept: FAMILY MEDICINE CLINIC | Age: 43
End: 2018-03-16
Payer: COMMERCIAL

## 2018-03-16 VITALS — OXYGEN SATURATION: 97 % | HEART RATE: 87 BPM | SYSTOLIC BLOOD PRESSURE: 118 MMHG | DIASTOLIC BLOOD PRESSURE: 78 MMHG

## 2018-03-16 DIAGNOSIS — N85.2 UTERINE ENLARGEMENT: ICD-10-CM

## 2018-03-16 DIAGNOSIS — Z12.39 SCREENING BREAST EXAMINATION: ICD-10-CM

## 2018-03-16 DIAGNOSIS — N94.6 MENORRHALGIA: ICD-10-CM

## 2018-03-16 DIAGNOSIS — Z00.00 WELL ADULT EXAM: Primary | ICD-10-CM

## 2018-03-16 LAB — PAP AGE BASED: NORMAL

## 2018-03-16 PROCEDURE — 99396 PREV VISIT EST AGE 40-64: CPT | Performed by: FAMILY MEDICINE

## 2018-03-16 ASSESSMENT — ENCOUNTER SYMPTOMS
ABDOMINAL PAIN: 0
WHEEZING: 0
COUGH: 0
SHORTNESS OF BREATH: 0

## 2018-03-16 NOTE — PROGRESS NOTES
Review of Systems   Respiratory: Negative for cough, shortness of breath and wheezing. Cardiovascular: Negative for chest pain, palpitations and leg swelling. Gastrointestinal: Negative for abdominal pain. Genitourinary: Positive for frequency. Negative for urgency. Hematuria:  states every hour i go to the bathroom if i am drinking something. Has pelvic pain states its getting worse is on left side and timing is random through out the month. Very light discharge all month, some days nothing some days barely there. Denies any abnormal bleeding. Had ablation 3-4 years ago periods came back and are very random. Last period feb 19th last only like 2 days. States pms is worse than actual period.

## 2018-03-16 NOTE — PROGRESS NOTES
out the month. Very light discharge all month, some days nothing some days barely there. Denies any abnormal bleeding. Had ablation 3-4 years ago periods came back and are very random. Last period feb 19th last only like 2 days. States pms is worse than actual period. Objective:     /78   Pulse 87   SpO2 97%     Physical Exam   Constitutional: She is oriented to person, place, and time. She appears well-developed and well-nourished. HENT:   Head: Normocephalic and atraumatic. Eyes: Conjunctivae and EOM are normal.   Neck: Normal range of motion. Neck supple. No JVD present. No thyromegaly present. Cardiovascular: Normal rate, regular rhythm and intact distal pulses. Exam reveals no gallop and no friction rub. No murmur heard. Pulmonary/Chest: Effort normal and breath sounds normal. No respiratory distress. Abdominal: Soft. Genitourinary: Rectum normal and vagina normal. No breast swelling, tenderness, discharge or bleeding. Pelvic exam was performed with patient prone. There is no rash or lesion on the right labia. There is no rash or lesion on the left labia. Uterus is enlarged (retroverted). Uterus is not tender. Cervix exhibits no motion tenderness. No erythema, tenderness or bleeding in the vagina. No signs of injury around the vagina. No vaginal discharge found. Musculoskeletal: She exhibits no edema. Lymphadenopathy:     She has no cervical adenopathy. Neurological: She is alert and oriented to person, place, and time. Skin: Skin is warm. Psychiatric: She has a normal mood and affect. Her behavior is normal. Judgment and thought content normal.   Nursing note and vitals reviewed. Assessment/Plan:     1. Well adult exam     2. Menorrhalgia  US Pelvis Complete    Ambulatory referral to Obstetrics / Gynecology   3. Uterine enlargement  US Pelvis Complete    Ambulatory referral to Obstetrics / Gynecology   4.  Screening breast examination  KYLE DIGITAL SCREEN W CAD BILATERAL With Prema's increasing pain with her cycle, constipation and her would refer for evaluation and possible hysterectomy. Lab Results   Component Value Date    WBC 7.7 01/23/2018    HGB 11.7 (L) 01/23/2018    HCT 35.4 (L) 01/23/2018     01/23/2018    CHOL 85 01/13/2018    TRIG 78 01/13/2018    HDL 32 (L) 01/13/2018     (L) 01/26/2018    K 4.7 01/26/2018    CL 96 (L) 01/26/2018    CREATININE 0.63 01/26/2018    BUN 13 01/26/2018    CO2 20 01/26/2018    TSH 5.07 (H) 01/17/2018       Return if symptoms worsen or fail to improve. Patient given educational materials - see patient instructions. Discussed use, benefit, and side effects of prescribed medications. All patient questions answered. Pt voiced understanding. Reviewed health maintenance. Instructed to continue current medications, diet and exercise. Patient agreed with treatment plan. Follow up as directed.      Electronically signed by Talia Guthrie MD on 3/17/2018

## 2018-03-24 LAB
AGE FOR GFR: 42
ANION GAP SERPL CALCULATED.3IONS-SCNC: 20 MMOL/L
BUN BLDV-MCNC: 9 MG/DL
CALCIUM SERPL-MCNC: 9.4 MG/DL
CHLORIDE BLD-SCNC: 107 MMOL/L
CHOLESTEROL/HDL RATIO: 2 RATIO
CHOLESTEROL: 100 MG/DL
CO2: 22 MMOL/L
CREAT SERPL-MCNC: 0.8 MG/DL
EGFR BF: 95 ML/MIN/1.73 M2
EGFR BM: 128 ML/MIN/1.73 M2
EGFR WF: 79 ML/MIN/1.73 M2
EGFR WM: 106 ML/MIN/1.73 M2
GLUCOSE: 88 MG/DL
HDL, DIRECT: 50 MG/DL
LDL CHOLESTEROL CALCULATED: 29 MG/DL
POTASSIUM SERPL-SCNC: 4.4 MMOL/L
SODIUM BLD-SCNC: 145 MMOL/L
TRIGL SERPL-MCNC: 105 MG/DL
TSH REFLEX FT4: 1.32 MIU/ML
VLDLC SERPL CALC-MCNC: 21 MG/DL

## 2018-03-27 ENCOUNTER — OFFICE VISIT (OUTPATIENT)
Dept: NEUROLOGY | Age: 43
End: 2018-03-27
Payer: COMMERCIAL

## 2018-03-27 VITALS
SYSTOLIC BLOOD PRESSURE: 128 MMHG | BODY MASS INDEX: 25.76 KG/M2 | HEIGHT: 62 IN | DIASTOLIC BLOOD PRESSURE: 81 MMHG | WEIGHT: 140 LBS | HEART RATE: 82 BPM

## 2018-03-27 DIAGNOSIS — I60.7 CEREBRAL ANEURYSM RUPTURE (HCC): Primary | ICD-10-CM

## 2018-03-27 PROCEDURE — 99215 OFFICE O/P EST HI 40 MIN: CPT | Performed by: PSYCHIATRY & NEUROLOGY

## 2018-03-27 NOTE — PROGRESS NOTES
Neurocritical Care, Stroke & Neurointerventional Note    [address]   [address] [place] [address],  [address], [address] [place] [address]          P: [phone]    Endovascular Neurosurgery Consult    Pt Name: Shellie Pete  MRN: [id number]  [place]: 1975  Date of evaluation: 3/27/2018  Primary Care Physician: Anni Madden MD    Reason for evaluation: Cerebral Aneurysm    SUBJECTIVE:   History of Chief Complaint:    A 43 yo woman with [address] who underwent coil embolization on 1- of a ruptured right mca bifurcation aneurysm. Her hospital course was marked by hydrocephalus s/p high volume LP x 1. She was discharged on 1/26/2018. She returns to clinic alone today, doing well. 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 She continues to smoke, but has tried to quit -- she is \"not ready\" to attempt again.       Allergies  is allergic to prozac [fluoxetine hcl] and chantix [varenicline]. Medications  Prior to Admission medications    Medication Sig Start Date End Date Taking? Authorizing Provider   levothyroxine (SYNTHROID) 25 MCG tablet Take 1 tablet by mouth Daily 1/27/18  Yes Morris Hagen MD   acetaminophen (TYLENOL) 325 MG tablet Take 2 tablets by mouth every 6 hours as needed for Pain 1/26/18  Yes Morris Hagen MD   simvastatin (ZOCOR) 40 MG tablet Take 1 tablet by mouth nightly 1/26/18  Yes Morris Hagen MD   ipratropium (ATROVENT) 0.06 % nasal spray 2 sprays by Nasal route 4 times daily as needed for Rhinitis 11/18/17 11/18/18 Yes Mirtha Crystal MD   albuterol sulfate HFA (VENTOLIN HFA) 108 (90 BASE) MCG/ACT inhaler Inhale 2 puffs into the lungs every 4 hours as needed for Wheezing or Shortness of Breath 5/24/17  Yes Dola Lesch, MD    Scheduled Meds:  Continuous Infusions:  PRN Meds:.  Past Medical History   has a past medical history of Caffeinism; Generalized headaches; Low back pain; Migraine; Patient in clinical research study;  Recurrent sinus infections; Ruptured middle cerebral artery aneurysm ([facility] [address] SAH (subarachnoid hemorrhage) ([facility] [address] and Tobacco use. Past Surgical History   has a past surgical history that includes other surgical history (04/02/2013); Tubal ligation (05/24/2011); and Brain aneurysm surgery (01/12/2018). Social History   reports that she quit smoking about 2 months ago. She started smoking about 27 years ago. She smoked 1.00 pack per day. She has never used smokeless tobacco.   reports that she does not drink alcohol. reports that she does not use drugs. Family History  family history includes Alcohol Abuse in her father; Breast Cancer in her paternal grandmother; Cancer in her maternal grandmother and sister; Sugey Blum in her maternal grandfather; Other in her father, mother, and another family member. Review of Systems:  CONSTITUTIONAL:  negative for fevers, chills, fatigue and malaise    EYES:  negative for double vision, blurred vision and photophobia     HEENT:  negative for tinnitus, epistaxis and sore throat    RESPIRATORY:  negative for cough, shortness of breath, wheezing    CARDIOVASCULAR:  negative for chest pain, palpitations, syncope, edema    GASTROINTESTINAL:  negative for nausea, vomiting    GENITOURINARY:  negative for incontinence    MUSCULOSKELETAL:  negative for neck or back pain    NEUROLOGICAL:  Negative for weakness and tingling  negative for headaches and dizziness    PSYCHIATRIC:  negative for anxiety      Review of systems otherwise negative. OBJECTIVE:   Vitals: /81 (Site: Right Arm, Position: Sitting, Cuff Size: Small Adult)   Pulse 82   Ht 5' 2\" (1.575 m)   Wt 140 lb (63.5 kg)   BMI 25.61 kg/m²   General appearance: Lying in bed, NAD  HEENT: Head: Normocephalic, no lesions, without obvious abnormality.   Neck: no adenopathy, no carotid bruit, no JVD, supple, symmetrical, trachea midline and thyroid not enlarged, symmetric, no tenderness/mass/nodules  Lungs: clear to auscultation bilaterally  Heart: regular rate and recoiling with stent  4. SBP < 130/80 mm Hg  5. Encouraged her to quit smoking         Thank you for the interesting evaluation. Further recommendations to follow. Consultation Visit Time:  40 minutes  Patient given educational materials - see patient instructions. Discussed use, benefit, and side effects of prescribed medications. Personally reviewed imaging with patients and all questions answered. Pt voiced understanding. Patient agreed with treatment plan. Follow up as directed below. Greater than 50% of the time was for counseling and providing answer to the patient question. The findings and the plan discussed with the patient and all her questions were answered. Thank you very much for your referral, please do not hesitate to contact me with any questions.     Clay Barbosa MD Pager: [phone]  Stroke, [facility]  [facility]   [address]

## 2018-03-29 ENCOUNTER — TELEPHONE (OUTPATIENT)
Dept: FAMILY MEDICINE CLINIC | Age: 43
End: 2018-03-29

## 2018-03-29 DIAGNOSIS — R92.8 ABNORMAL MAMMOGRAM OF RIGHT BREAST: Primary | ICD-10-CM

## 2018-04-02 ENCOUNTER — TELEPHONE (OUTPATIENT)
Dept: FAMILY MEDICINE CLINIC | Age: 43
End: 2018-04-02

## 2018-04-02 DIAGNOSIS — R92.8 ABNORMALITY OF RIGHT BREAST ON SCREENING MAMMOGRAPHY: Primary | ICD-10-CM

## 2018-04-06 ENCOUNTER — OFFICE VISIT (OUTPATIENT)
Dept: FAMILY MEDICINE CLINIC | Age: 43
End: 2018-04-06
Payer: COMMERCIAL

## 2018-04-06 VITALS
RESPIRATION RATE: 10 BRPM | SYSTOLIC BLOOD PRESSURE: 122 MMHG | DIASTOLIC BLOOD PRESSURE: 78 MMHG | BODY MASS INDEX: 25.79 KG/M2 | WEIGHT: 141 LBS | HEART RATE: 88 BPM | OXYGEN SATURATION: 96 %

## 2018-04-06 DIAGNOSIS — Z72.0 TOBACCO USE: ICD-10-CM

## 2018-04-06 DIAGNOSIS — E03.9 HYPOTHYROIDISM, UNSPECIFIED TYPE: ICD-10-CM

## 2018-04-06 DIAGNOSIS — E78.5 DYSLIPIDEMIA: ICD-10-CM

## 2018-04-06 DIAGNOSIS — R92.8 ABNORMAL MAMMOGRAM OF RIGHT BREAST: ICD-10-CM

## 2018-04-06 DIAGNOSIS — I60.8 ANEURYSMAL SUBARACHNOID HEMORRHAGE (HCC): Primary | ICD-10-CM

## 2018-04-06 PROCEDURE — 99214 OFFICE O/P EST MOD 30 MIN: CPT | Performed by: FAMILY MEDICINE

## 2018-04-06 RX ORDER — LEVOTHYROXINE SODIUM 0.03 MG/1
25 TABLET ORAL DAILY
Qty: 30 TABLET | Refills: 11 | Status: SHIPPED | OUTPATIENT
Start: 2018-04-06 | End: 2019-04-17 | Stop reason: SDUPTHER

## 2018-04-06 RX ORDER — SIMVASTATIN 40 MG
40 TABLET ORAL NIGHTLY
Qty: 30 TABLET | Refills: 11 | Status: SHIPPED | OUTPATIENT
Start: 2018-04-06 | End: 2019-04-17 | Stop reason: SDUPTHER

## 2018-04-06 ASSESSMENT — ENCOUNTER SYMPTOMS
WHEEZING: 0
COUGH: 0
RHINORRHEA: 0
SORE THROAT: 0
SHORTNESS OF BREATH: 0
ABDOMINAL PAIN: 0

## 2018-04-20 ENCOUNTER — HOSPITAL ENCOUNTER (OUTPATIENT)
Dept: INTERVENTIONAL RADIOLOGY/VASCULAR | Age: 43
Discharge: HOME | End: 2018-04-22
Payer: COMMERCIAL

## 2018-04-20 VITALS
SYSTOLIC BLOOD PRESSURE: 105 MMHG | HEART RATE: 62 BPM | DIASTOLIC BLOOD PRESSURE: 69 MMHG | OXYGEN SATURATION: 97 % | RESPIRATION RATE: 21 BRPM

## 2018-04-20 VITALS
DIASTOLIC BLOOD PRESSURE: 71 MMHG | HEART RATE: 70 BPM | TEMPERATURE: 98 F | WEIGHT: 142 LBS | OXYGEN SATURATION: 98 % | SYSTOLIC BLOOD PRESSURE: 118 MMHG | RESPIRATION RATE: 16 BRPM | HEIGHT: 62 IN | BODY MASS INDEX: 26.13 KG/M2

## 2018-04-20 DIAGNOSIS — I67.1 CEREBRAL ANEURYSM: ICD-10-CM

## 2018-04-20 LAB
GFR NON-AFRICAN AMERICAN: >60 ML/MIN
GFR SERPL CREATININE-BSD FRML MDRD: >60 ML/MIN
GFR SERPL CREATININE-BSD FRML MDRD: NORMAL ML/MIN/{1.73_M2}
GLUCOSE BLD-MCNC: 93 MG/DL (ref 74–100)
PLATELET # BLD: 229 K/UL (ref 138–453)
POC CHLORIDE: 111 MMOL/L (ref 98–107)
POC CREATININE: 0.76 MG/DL (ref 0.51–1.19)
POC HEMATOCRIT: 41 % (ref 36–46)
POC HEMOGLOBIN: 14 G/DL (ref 12–16)
POC POTASSIUM: 4.1 MMOL/L (ref 3.5–4.5)
POC SODIUM: 141 MMOL/L (ref 138–146)

## 2018-04-20 PROCEDURE — 84132 ASSAY OF SERUM POTASSIUM: CPT

## 2018-04-20 PROCEDURE — 84295 ASSAY OF SERUM SODIUM: CPT

## 2018-04-20 PROCEDURE — 6360000004 HC RX CONTRAST MEDICATION: Performed by: PSYCHIATRY & NEUROLOGY

## 2018-04-20 PROCEDURE — 99215 OFFICE O/P EST HI 40 MIN: CPT | Performed by: PSYCHIATRY & NEUROLOGY

## 2018-04-20 PROCEDURE — 7100000010 HC PHASE II RECOVERY - FIRST 15 MIN

## 2018-04-20 PROCEDURE — 36224 PLACE CATH CAROTD ART: CPT | Performed by: PSYCHIATRY & NEUROLOGY

## 2018-04-20 PROCEDURE — 82435 ASSAY OF BLOOD CHLORIDE: CPT

## 2018-04-20 PROCEDURE — 7100000011 HC PHASE II RECOVERY - ADDTL 15 MIN

## 2018-04-20 PROCEDURE — 82947 ASSAY GLUCOSE BLOOD QUANT: CPT

## 2018-04-20 PROCEDURE — 85049 AUTOMATED PLATELET COUNT: CPT

## 2018-04-20 PROCEDURE — 6370000000 HC RX 637 (ALT 250 FOR IP)

## 2018-04-20 PROCEDURE — 6370000000 HC RX 637 (ALT 250 FOR IP): Performed by: PSYCHIATRY & NEUROLOGY

## 2018-04-20 PROCEDURE — 85014 HEMATOCRIT: CPT

## 2018-04-20 PROCEDURE — 82565 ASSAY OF CREATININE: CPT

## 2018-04-20 PROCEDURE — 2580000003 HC RX 258: Performed by: PSYCHIATRY & NEUROLOGY

## 2018-04-20 PROCEDURE — C1894 INTRO/SHEATH, NON-LASER: HCPCS

## 2018-04-20 RX ORDER — SODIUM CHLORIDE 9 MG/ML
INJECTION, SOLUTION INTRAVENOUS CONTINUOUS
Status: DISCONTINUED | OUTPATIENT
Start: 2018-04-20 | End: 2018-04-23 | Stop reason: HOSPADM

## 2018-04-20 RX ORDER — IODIXANOL 270 MG/ML
73 INJECTION, SOLUTION INTRAVASCULAR
Status: COMPLETED | OUTPATIENT
Start: 2018-04-20 | End: 2018-04-20

## 2018-04-20 RX ORDER — ACETAMINOPHEN 325 MG/1
650 TABLET ORAL EVERY 4 HOURS PRN
Status: DISCONTINUED | OUTPATIENT
Start: 2018-04-20 | End: 2018-04-23 | Stop reason: HOSPADM

## 2018-04-20 RX ADMIN — IODIXANOL 73 ML: 270 INJECTION, SOLUTION INTRAVASCULAR at 12:17

## 2018-04-20 RX ADMIN — SODIUM CHLORIDE: 9 INJECTION, SOLUTION INTRAVENOUS at 07:28

## 2018-04-20 RX ADMIN — ACETAMINOPHEN 650 MG: 325 TABLET ORAL at 14:00

## 2018-04-20 ASSESSMENT — PAIN SCALES - GENERAL
PAINLEVEL_OUTOF10: 6
PAINLEVEL_OUTOF10: 6

## 2018-04-30 ENCOUNTER — OFFICE VISIT (OUTPATIENT)
Dept: NEUROLOGY | Age: 43
End: 2018-04-30
Payer: COMMERCIAL

## 2018-04-30 VITALS
WEIGHT: 139 LBS | HEIGHT: 62 IN | BODY MASS INDEX: 25.58 KG/M2 | HEART RATE: 83 BPM | DIASTOLIC BLOOD PRESSURE: 85 MMHG | SYSTOLIC BLOOD PRESSURE: 133 MMHG

## 2018-04-30 DIAGNOSIS — I67.1 CEREBRAL ANEURYSM: ICD-10-CM

## 2018-04-30 DIAGNOSIS — I60.8 ANEURYSMAL SUBARACHNOID HEMORRHAGE (HCC): Primary | ICD-10-CM

## 2018-04-30 PROCEDURE — 99215 OFFICE O/P EST HI 40 MIN: CPT | Performed by: PSYCHIATRY & NEUROLOGY

## 2018-04-30 RX ORDER — CLOPIDOGREL BISULFATE 75 MG/1
75 TABLET ORAL DAILY
Qty: 30 TABLET | Refills: 3 | Status: ON HOLD | OUTPATIENT
Start: 2018-04-30 | End: 2018-06-02 | Stop reason: HOSPADM

## 2018-04-30 RX ORDER — ASPIRIN 81 MG/1
81 TABLET ORAL DAILY
Qty: 30 TABLET | Refills: 3 | Status: ON HOLD | OUTPATIENT
Start: 2018-04-30 | End: 2018-06-02 | Stop reason: HOSPADM

## 2018-05-30 ENCOUNTER — ANESTHESIA EVENT (OUTPATIENT)
Dept: INTERVENTIONAL RADIOLOGY/VASCULAR | Age: 43
End: 2018-05-30

## 2018-05-31 ENCOUNTER — HOSPITAL ENCOUNTER (INPATIENT)
Dept: INTERVENTIONAL RADIOLOGY/VASCULAR | Age: 43
LOS: 2 days | Discharge: HOME | DRG: 027 | End: 2018-06-02
Attending: PSYCHIATRY & NEUROLOGY | Admitting: PSYCHIATRY & NEUROLOGY
Payer: COMMERCIAL

## 2018-05-31 ENCOUNTER — ANESTHESIA (OUTPATIENT)
Dept: INTERVENTIONAL RADIOLOGY/VASCULAR | Age: 43
End: 2018-05-31

## 2018-05-31 VITALS — DIASTOLIC BLOOD PRESSURE: 93 MMHG | OXYGEN SATURATION: 95 % | TEMPERATURE: 99.9 F | SYSTOLIC BLOOD PRESSURE: 119 MMHG

## 2018-05-31 DIAGNOSIS — I67.1 CEREBRAL ARTERIAL ANEURYSM: ICD-10-CM

## 2018-05-31 DIAGNOSIS — I72.9 ANEURYSM (HCC): ICD-10-CM

## 2018-05-31 LAB
ABO/RH: NORMAL
ACTIVATED CLOTTING TIME: 140 SEC (ref 79–149)
ACTIVATED CLOTTING TIME: 144 SEC (ref 79–149)
ACTIVATED CLOTTING TIME: 176 SEC (ref 79–149)
ACTIVATED CLOTTING TIME: 196 SEC (ref 79–149)
ACTIVATED CLOTTING TIME: 217 SEC (ref 79–149)
ACTIVATED CLOTTING TIME: 225 SEC (ref 79–149)
ACTIVATED CLOTTING TIME: 237 SEC (ref 79–149)
ACTIVATED CLOTTING TIME: 249 SEC (ref 79–149)
ACTIVATED CLOTTING TIME: 261 SEC (ref 79–149)
ACTIVATED CLOTTING TIME: 274 SEC (ref 79–149)
ANION GAP SERPL CALCULATED.3IONS-SCNC: 14 MMOL/L (ref 9–17)
ANTIBODY SCREEN: NEGATIVE
ARM BAND NUMBER: NORMAL
BLOOD BANK SPECIMEN: NORMAL
BUN BLDV-MCNC: 10 MG/DL (ref 6–20)
BUN/CREAT BLD: ABNORMAL (ref 9–20)
CALCIUM SERPL-MCNC: 8.6 MG/DL (ref 8.6–10.4)
CHLORIDE BLD-SCNC: 107 MMOL/L (ref 98–107)
CO2: 17 MMOL/L (ref 20–31)
COLLAGEN ADENOSINE-5'-DIPHOSPHATE (ADP) TIME: 69 SEC (ref 67–112)
COLLAGEN EPINEPHRINE TIME: 118 SEC (ref 85–172)
CREAT SERPL-MCNC: 0.66 MG/DL (ref 0.5–0.9)
EXPIRATION DATE: NORMAL
GFR AFRICAN AMERICAN: >60 ML/MIN
GFR NON-AFRICAN AMERICAN: >60 ML/MIN
GFR SERPL CREATININE-BSD FRML MDRD: ABNORMAL ML/MIN/{1.73_M2}
GFR SERPL CREATININE-BSD FRML MDRD: ABNORMAL ML/MIN/{1.73_M2}
GLUCOSE BLD-MCNC: 100 MG/DL (ref 70–99)
HCG, PREGNANCY URINE (POC): NEGATIVE
HCT VFR BLD CALC: 40.6 % (ref 36.3–47.1)
HEMOGLOBIN: 13.7 G/DL (ref 11.9–15.1)
INR BLD: 0.9
MCH RBC QN AUTO: 31.6 PG (ref 25.2–33.5)
MCHC RBC AUTO-ENTMCNC: 33.7 G/DL (ref 28.4–34.8)
MCV RBC AUTO: 93.5 FL (ref 82.6–102.9)
NRBC AUTOMATED: 0 PER 100 WBC
PARTIAL THROMBOPLASTIN TIME: 23.4 SEC (ref 20.5–30.5)
PDW BLD-RTO: 12.7 % (ref 11.8–14.4)
PLATELET # BLD: 261 K/UL (ref 138–453)
PLATELET FUNCTION INTERP: NORMAL
PMV BLD AUTO: 9 FL (ref 8.1–13.5)
POTASSIUM SERPL-SCNC: 4 MMOL/L (ref 3.7–5.3)
PROTHROMBIN TIME: 9.7 SEC (ref 9–12)
RBC # BLD: 4.34 M/UL (ref 3.95–5.11)
SODIUM BLD-SCNC: 138 MMOL/L (ref 135–144)
WBC # BLD: 8.5 K/UL (ref 3.5–11.3)

## 2018-05-31 PROCEDURE — 36224 PLACE CATH CAROTD ART: CPT | Performed by: PSYCHIATRY & NEUROLOGY

## 2018-05-31 PROCEDURE — C1876 STENT, NON-COA/NON-COV W/DEL: HCPCS

## 2018-05-31 PROCEDURE — 84703 CHORIONIC GONADOTROPIN ASSAY: CPT

## 2018-05-31 PROCEDURE — 85576 BLOOD PLATELET AGGREGATION: CPT

## 2018-05-31 PROCEDURE — 6360000004 HC RX CONTRAST MEDICATION: Performed by: PSYCHIATRY & NEUROLOGY

## 2018-05-31 PROCEDURE — 85730 THROMBOPLASTIN TIME PARTIAL: CPT

## 2018-05-31 PROCEDURE — 2000000003 HC NEURO ICU R&B

## 2018-05-31 PROCEDURE — 6360000002 HC RX W HCPCS: Performed by: ANESTHESIOLOGY

## 2018-05-31 PROCEDURE — 03LG3DZ OCCLUSION OF INTRACRANIAL ARTERY WITH INTRALUMINAL DEVICE, PERCUTANEOUS APPROACH: ICD-10-PCS | Performed by: PSYCHIATRY & NEUROLOGY

## 2018-05-31 PROCEDURE — 75894 X-RAYS TRANSCATH THERAPY: CPT | Performed by: PSYCHIATRY & NEUROLOGY

## 2018-05-31 PROCEDURE — 99291 CRITICAL CARE FIRST HOUR: CPT | Performed by: PSYCHIATRY & NEUROLOGY

## 2018-05-31 PROCEDURE — 85347 COAGULATION TIME ACTIVATED: CPT

## 2018-05-31 PROCEDURE — 6370000000 HC RX 637 (ALT 250 FOR IP): Performed by: NURSE PRACTITIONER

## 2018-05-31 PROCEDURE — 80048 BASIC METABOLIC PNL TOTAL CA: CPT

## 2018-05-31 PROCEDURE — 86850 RBC ANTIBODY SCREEN: CPT

## 2018-05-31 PROCEDURE — 6360000002 HC RX W HCPCS: Performed by: NURSE ANESTHETIST, CERTIFIED REGISTERED

## 2018-05-31 PROCEDURE — 2580000003 HC RX 258: Performed by: ANESTHESIOLOGY

## 2018-05-31 PROCEDURE — 2500000003 HC RX 250 WO HCPCS: Performed by: NURSE ANESTHETIST, CERTIFIED REGISTERED

## 2018-05-31 PROCEDURE — C1769 GUIDE WIRE: HCPCS

## 2018-05-31 PROCEDURE — 86901 BLOOD TYPING SEROLOGIC RH(D): CPT

## 2018-05-31 PROCEDURE — 61624 TCAT PERM OCCLS/EMBOLJ CNS: CPT | Performed by: PSYCHIATRY & NEUROLOGY

## 2018-05-31 PROCEDURE — 85610 PROTHROMBIN TIME: CPT

## 2018-05-31 PROCEDURE — 2580000003 HC RX 258: Performed by: NURSE ANESTHETIST, CERTIFIED REGISTERED

## 2018-05-31 PROCEDURE — 76377 3D RENDER W/INTRP POSTPROCES: CPT | Performed by: PSYCHIATRY & NEUROLOGY

## 2018-05-31 PROCEDURE — 3700000001 HC ADD 15 MINUTES (ANESTHESIA)

## 2018-05-31 PROCEDURE — 75898 FOLLOW-UP ANGIOGRAPHY: CPT | Performed by: PSYCHIATRY & NEUROLOGY

## 2018-05-31 PROCEDURE — 85027 COMPLETE CBC AUTOMATED: CPT

## 2018-05-31 PROCEDURE — 86900 BLOOD TYPING SEROLOGIC ABO: CPT

## 2018-05-31 PROCEDURE — 3700000000 HC ANESTHESIA ATTENDED CARE

## 2018-05-31 PROCEDURE — 99223 1ST HOSP IP/OBS HIGH 75: CPT | Performed by: PSYCHIATRY & NEUROLOGY

## 2018-05-31 PROCEDURE — 2580000003 HC RX 258: Performed by: NURSE PRACTITIONER

## 2018-05-31 PROCEDURE — 7100000001 HC PACU RECOVERY - ADDTL 15 MIN

## 2018-05-31 PROCEDURE — 6360000002 HC RX W HCPCS: Performed by: NURSE PRACTITIONER

## 2018-05-31 PROCEDURE — 7100000000 HC PACU RECOVERY - FIRST 15 MIN

## 2018-05-31 RX ORDER — GLYCOPYRROLATE 1 MG/5 ML
SYRINGE (ML) INTRAVENOUS PRN
Status: DISCONTINUED | OUTPATIENT
Start: 2018-05-31 | End: 2018-05-31 | Stop reason: SDUPTHER

## 2018-05-31 RX ORDER — CEFAZOLIN SODIUM 1 G/3ML
INJECTION, POWDER, FOR SOLUTION INTRAMUSCULAR; INTRAVENOUS PRN
Status: DISCONTINUED | OUTPATIENT
Start: 2018-05-31 | End: 2018-05-31 | Stop reason: SDUPTHER

## 2018-05-31 RX ORDER — SIMVASTATIN 40 MG
40 TABLET ORAL NIGHTLY
Status: DISCONTINUED | OUTPATIENT
Start: 2018-05-31 | End: 2018-06-02 | Stop reason: HOSPADM

## 2018-05-31 RX ORDER — PROTAMINE SULFATE 10 MG/ML
INJECTION, SOLUTION INTRAVENOUS PRN
Status: DISCONTINUED | OUTPATIENT
Start: 2018-05-31 | End: 2018-05-31 | Stop reason: SDUPTHER

## 2018-05-31 RX ORDER — LEVOTHYROXINE SODIUM 0.03 MG/1
25 TABLET ORAL DAILY
Status: DISCONTINUED | OUTPATIENT
Start: 2018-05-31 | End: 2018-06-02 | Stop reason: HOSPADM

## 2018-05-31 RX ORDER — HEPARIN SODIUM 1000 [USP'U]/ML
INJECTION, SOLUTION INTRAVENOUS; SUBCUTANEOUS PRN
Status: DISCONTINUED | OUTPATIENT
Start: 2018-05-31 | End: 2018-05-31 | Stop reason: SDUPTHER

## 2018-05-31 RX ORDER — DEXAMETHASONE SODIUM PHOSPHATE 10 MG/ML
INJECTION INTRAMUSCULAR; INTRAVENOUS PRN
Status: DISCONTINUED | OUTPATIENT
Start: 2018-05-31 | End: 2018-05-31 | Stop reason: SDUPTHER

## 2018-05-31 RX ORDER — HYDRALAZINE HYDROCHLORIDE 20 MG/ML
10 INJECTION INTRAMUSCULAR; INTRAVENOUS
Status: DISCONTINUED | OUTPATIENT
Start: 2018-05-31 | End: 2018-06-02 | Stop reason: HOSPADM

## 2018-05-31 RX ORDER — SENNA AND DOCUSATE SODIUM 50; 8.6 MG/1; MG/1
2 TABLET, FILM COATED ORAL DAILY
Status: DISCONTINUED | OUTPATIENT
Start: 2018-05-31 | End: 2018-06-02 | Stop reason: HOSPADM

## 2018-05-31 RX ORDER — ACETAMINOPHEN 325 MG/1
650 TABLET ORAL EVERY 4 HOURS PRN
Status: DISCONTINUED | OUTPATIENT
Start: 2018-05-31 | End: 2018-06-02 | Stop reason: HOSPADM

## 2018-05-31 RX ORDER — ALBUTEROL SULFATE 90 UG/1
2 AEROSOL, METERED RESPIRATORY (INHALATION) EVERY 4 HOURS PRN
Status: DISCONTINUED | OUTPATIENT
Start: 2018-05-31 | End: 2018-06-02 | Stop reason: HOSPADM

## 2018-05-31 RX ORDER — CLOPIDOGREL BISULFATE 75 MG/1
75 TABLET ORAL DAILY
Status: DISCONTINUED | OUTPATIENT
Start: 2018-06-01 | End: 2018-06-02 | Stop reason: HOSPADM

## 2018-05-31 RX ORDER — LIDOCAINE HYDROCHLORIDE 10 MG/ML
INJECTION, SOLUTION EPIDURAL; INFILTRATION; INTRACAUDAL; PERINEURAL PRN
Status: DISCONTINUED | OUTPATIENT
Start: 2018-05-31 | End: 2018-05-31 | Stop reason: SDUPTHER

## 2018-05-31 RX ORDER — PROPOFOL 10 MG/ML
INJECTION, EMULSION INTRAVENOUS PRN
Status: DISCONTINUED | OUTPATIENT
Start: 2018-05-31 | End: 2018-05-31 | Stop reason: SDUPTHER

## 2018-05-31 RX ORDER — FENTANYL CITRATE 50 UG/ML
25 INJECTION, SOLUTION INTRAMUSCULAR; INTRAVENOUS EVERY 5 MIN PRN
Status: DISCONTINUED | OUTPATIENT
Start: 2018-05-31 | End: 2018-06-02 | Stop reason: HOSPADM

## 2018-05-31 RX ORDER — LIDOCAINE HYDROCHLORIDE 10 MG/ML
1 INJECTION, SOLUTION EPIDURAL; INFILTRATION; INTRACAUDAL; PERINEURAL
Status: DISPENSED | OUTPATIENT
Start: 2018-05-31 | End: 2018-05-31

## 2018-05-31 RX ORDER — ROCURONIUM BROMIDE 10 MG/ML
INJECTION, SOLUTION INTRAVENOUS PRN
Status: DISCONTINUED | OUTPATIENT
Start: 2018-05-31 | End: 2018-05-31 | Stop reason: SDUPTHER

## 2018-05-31 RX ORDER — FENTANYL CITRATE 50 UG/ML
INJECTION, SOLUTION INTRAMUSCULAR; INTRAVENOUS PRN
Status: DISCONTINUED | OUTPATIENT
Start: 2018-05-31 | End: 2018-05-31 | Stop reason: SDUPTHER

## 2018-05-31 RX ORDER — SODIUM CHLORIDE 9 MG/ML
INJECTION, SOLUTION INTRAVENOUS CONTINUOUS
Status: DISCONTINUED | OUTPATIENT
Start: 2018-05-31 | End: 2018-06-02 | Stop reason: HOSPADM

## 2018-05-31 RX ORDER — SODIUM CHLORIDE, SODIUM LACTATE, POTASSIUM CHLORIDE, CALCIUM CHLORIDE 600; 310; 30; 20 MG/100ML; MG/100ML; MG/100ML; MG/100ML
INJECTION, SOLUTION INTRAVENOUS CONTINUOUS
Status: DISCONTINUED | OUTPATIENT
Start: 2018-05-31 | End: 2018-05-31

## 2018-05-31 RX ORDER — SODIUM CHLORIDE, SODIUM LACTATE, POTASSIUM CHLORIDE, CALCIUM CHLORIDE 600; 310; 30; 20 MG/100ML; MG/100ML; MG/100ML; MG/100ML
INJECTION, SOLUTION INTRAVENOUS CONTINUOUS PRN
Status: DISCONTINUED | OUTPATIENT
Start: 2018-05-31 | End: 2018-05-31 | Stop reason: SDUPTHER

## 2018-05-31 RX ORDER — IODIXANOL 270 MG/ML
155 INJECTION, SOLUTION INTRAVASCULAR
Status: COMPLETED | OUTPATIENT
Start: 2018-05-31 | End: 2018-05-31

## 2018-05-31 RX ORDER — FENTANYL CITRATE 50 UG/ML
25 INJECTION, SOLUTION INTRAMUSCULAR; INTRAVENOUS
Status: DISCONTINUED | OUTPATIENT
Start: 2018-05-31 | End: 2018-06-02 | Stop reason: HOSPADM

## 2018-05-31 RX ORDER — NEOSTIGMINE METHYLSULFATE 5 MG/5 ML
SYRINGE (ML) INTRAVENOUS PRN
Status: DISCONTINUED | OUTPATIENT
Start: 2018-05-31 | End: 2018-05-31 | Stop reason: SDUPTHER

## 2018-05-31 RX ORDER — LABETALOL HYDROCHLORIDE 5 MG/ML
10 INJECTION, SOLUTION INTRAVENOUS
Status: DISCONTINUED | OUTPATIENT
Start: 2018-05-31 | End: 2018-06-02 | Stop reason: HOSPADM

## 2018-05-31 RX ORDER — 0.9 % SODIUM CHLORIDE 0.9 %
500 INTRAVENOUS SOLUTION INTRAVENOUS ONCE
Status: COMPLETED | OUTPATIENT
Start: 2018-05-31 | End: 2018-05-31

## 2018-05-31 RX ORDER — ASPIRIN 81 MG/1
81 TABLET ORAL DAILY
Status: DISCONTINUED | OUTPATIENT
Start: 2018-06-01 | End: 2018-06-02 | Stop reason: HOSPADM

## 2018-05-31 RX ORDER — ONDANSETRON 2 MG/ML
INJECTION INTRAMUSCULAR; INTRAVENOUS PRN
Status: DISCONTINUED | OUTPATIENT
Start: 2018-05-31 | End: 2018-05-31 | Stop reason: SDUPTHER

## 2018-05-31 RX ORDER — ONDANSETRON 2 MG/ML
4 INJECTION INTRAMUSCULAR; INTRAVENOUS EVERY 6 HOURS PRN
Status: DISCONTINUED | OUTPATIENT
Start: 2018-05-31 | End: 2018-06-02 | Stop reason: HOSPADM

## 2018-05-31 RX ADMIN — ROCURONIUM BROMIDE 20 MG: 10 INJECTION INTRAVENOUS at 12:00

## 2018-05-31 RX ADMIN — HEPARIN SODIUM 2000 UNITS: 1000 INJECTION, SOLUTION INTRAVENOUS; SUBCUTANEOUS at 10:23

## 2018-05-31 RX ADMIN — FENTANYL CITRATE 25 MCG: 50 INJECTION, SOLUTION INTRAMUSCULAR; INTRAVENOUS at 14:08

## 2018-05-31 RX ADMIN — ROCURONIUM BROMIDE 10 MG: 10 INJECTION INTRAVENOUS at 11:18

## 2018-05-31 RX ADMIN — PROTAMINE SULFATE 75 MG: 10 INJECTION, SOLUTION INTRAVENOUS at 12:52

## 2018-05-31 RX ADMIN — CEFAZOLIN 2000 MG: 1 INJECTION, POWDER, FOR SOLUTION INTRAMUSCULAR; INTRAVENOUS at 09:25

## 2018-05-31 RX ADMIN — PHENYLEPHRINE HYDROCHLORIDE 20 MCG/MIN: 10 INJECTION INTRAVENOUS at 09:40

## 2018-05-31 RX ADMIN — SODIUM CHLORIDE, POTASSIUM CHLORIDE, SODIUM LACTATE AND CALCIUM CHLORIDE: 600; 310; 30; 20 INJECTION, SOLUTION INTRAVENOUS at 08:51

## 2018-05-31 RX ADMIN — SODIUM CHLORIDE, POTASSIUM CHLORIDE, SODIUM LACTATE AND CALCIUM CHLORIDE: 600; 310; 30; 20 INJECTION, SOLUTION INTRAVENOUS at 11:37

## 2018-05-31 RX ADMIN — FENTANYL CITRATE 25 MCG: 50 INJECTION INTRAMUSCULAR; INTRAVENOUS at 13:06

## 2018-05-31 RX ADMIN — FENTANYL CITRATE 50 MCG: 50 INJECTION INTRAMUSCULAR; INTRAVENOUS at 09:01

## 2018-05-31 RX ADMIN — PROPOFOL 100 MG: 10 INJECTION, EMULSION INTRAVENOUS at 09:01

## 2018-05-31 RX ADMIN — PROPOFOL 100 MG: 10 INJECTION, EMULSION INTRAVENOUS at 09:04

## 2018-05-31 RX ADMIN — ROCURONIUM BROMIDE 10 MG: 10 INJECTION INTRAVENOUS at 12:32

## 2018-05-31 RX ADMIN — ROCURONIUM BROMIDE 20 MG: 10 INJECTION INTRAVENOUS at 10:06

## 2018-05-31 RX ADMIN — SODIUM CHLORIDE, POTASSIUM CHLORIDE, SODIUM LACTATE AND CALCIUM CHLORIDE: 600; 310; 30; 20 INJECTION, SOLUTION INTRAVENOUS at 10:10

## 2018-05-31 RX ADMIN — HEPARIN SODIUM 4500 UNITS: 1000 INJECTION, SOLUTION INTRAVENOUS; SUBCUTANEOUS at 09:50

## 2018-05-31 RX ADMIN — ONDANSETRON 4 MG: 2 INJECTION, SOLUTION INTRAMUSCULAR; INTRAVENOUS at 12:56

## 2018-05-31 RX ADMIN — SIMVASTATIN 40 MG: 40 TABLET, FILM COATED ORAL at 20:15

## 2018-05-31 RX ADMIN — FENTANYL CITRATE 25 MCG: 50 INJECTION INTRAMUSCULAR; INTRAVENOUS at 20:11

## 2018-05-31 RX ADMIN — FENTANYL CITRATE 25 MCG: 50 INJECTION INTRAMUSCULAR; INTRAVENOUS at 15:26

## 2018-05-31 RX ADMIN — FENTANYL CITRATE 25 MCG: 50 INJECTION INTRAMUSCULAR; INTRAVENOUS at 13:21

## 2018-05-31 RX ADMIN — PHENYLEPHRINE HYDROCHLORIDE 50 MCG: 10 INJECTION INTRAVENOUS at 09:33

## 2018-05-31 RX ADMIN — ROCURONIUM BROMIDE 10 MG: 10 INJECTION INTRAVENOUS at 11:34

## 2018-05-31 RX ADMIN — SODIUM CHLORIDE, POTASSIUM CHLORIDE, SODIUM LACTATE AND CALCIUM CHLORIDE: 600; 310; 30; 20 INJECTION, SOLUTION INTRAVENOUS at 08:05

## 2018-05-31 RX ADMIN — DEXAMETHASONE SODIUM PHOSPHATE 10 MG: 10 INJECTION INTRAMUSCULAR; INTRAVENOUS at 09:25

## 2018-05-31 RX ADMIN — FENTANYL CITRATE 50 MCG: 50 INJECTION INTRAMUSCULAR; INTRAVENOUS at 08:54

## 2018-05-31 RX ADMIN — SODIUM CHLORIDE: 9 INJECTION, SOLUTION INTRAVENOUS at 15:15

## 2018-05-31 RX ADMIN — HEPARIN SODIUM 2000 UNITS: 1000 INJECTION, SOLUTION INTRAVENOUS; SUBCUTANEOUS at 11:50

## 2018-05-31 RX ADMIN — ACETAMINOPHEN 650 MG: 325 TABLET ORAL at 18:24

## 2018-05-31 RX ADMIN — Medication 2 MG: at 13:00

## 2018-05-31 RX ADMIN — HEPARIN SODIUM 2000 UNITS: 1000 INJECTION, SOLUTION INTRAVENOUS; SUBCUTANEOUS at 12:30

## 2018-05-31 RX ADMIN — HEPARIN SODIUM 2000 UNITS: 1000 INJECTION, SOLUTION INTRAVENOUS; SUBCUTANEOUS at 10:51

## 2018-05-31 RX ADMIN — LIDOCAINE HYDROCHLORIDE 5 MG: 10 INJECTION, SOLUTION EPIDURAL; INFILTRATION; INTRACAUDAL; PERINEURAL at 09:01

## 2018-05-31 RX ADMIN — IODIXANOL 155 ML: 270 INJECTION, SOLUTION INTRAVASCULAR at 12:21

## 2018-05-31 RX ADMIN — FENTANYL CITRATE 25 MCG: 50 INJECTION, SOLUTION INTRAMUSCULAR; INTRAVENOUS at 14:02

## 2018-05-31 RX ADMIN — SODIUM CHLORIDE 500 ML: 9 INJECTION, SOLUTION INTRAVENOUS at 15:14

## 2018-05-31 RX ADMIN — ROCURONIUM BROMIDE 50 MG: 10 INJECTION INTRAVENOUS at 09:01

## 2018-05-31 RX ADMIN — Medication 0.4 MG: at 13:00

## 2018-05-31 ASSESSMENT — PULMONARY FUNCTION TESTS
PIF_VALUE: 16
PIF_VALUE: 17
PIF_VALUE: 16
PIF_VALUE: 2
PIF_VALUE: 16
PIF_VALUE: 0
PIF_VALUE: 16
PIF_VALUE: 17
PIF_VALUE: 15
PIF_VALUE: 16
PIF_VALUE: 15
PIF_VALUE: 17
PIF_VALUE: 16
PIF_VALUE: 17
PIF_VALUE: 17
PIF_VALUE: 0
PIF_VALUE: 15
PIF_VALUE: 15
PIF_VALUE: 14
PIF_VALUE: 13
PIF_VALUE: 16
PIF_VALUE: 17
PIF_VALUE: 16
PIF_VALUE: 15
PIF_VALUE: 16
PIF_VALUE: 17
PIF_VALUE: 16
PIF_VALUE: 15
PIF_VALUE: 0
PIF_VALUE: 16
PIF_VALUE: 24
PIF_VALUE: 16
PIF_VALUE: 12
PIF_VALUE: 12
PIF_VALUE: 16
PIF_VALUE: 16
PIF_VALUE: 17
PIF_VALUE: 16
PIF_VALUE: 0
PIF_VALUE: 16
PIF_VALUE: 1
PIF_VALUE: 16
PIF_VALUE: 14
PIF_VALUE: 15
PIF_VALUE: 16
PIF_VALUE: 16
PIF_VALUE: 15
PIF_VALUE: 15
PIF_VALUE: 16
PIF_VALUE: 17
PIF_VALUE: 17
PIF_VALUE: 16
PIF_VALUE: 16
PIF_VALUE: 17
PIF_VALUE: 16
PIF_VALUE: 15
PIF_VALUE: 0
PIF_VALUE: 16
PIF_VALUE: 14
PIF_VALUE: 16
PIF_VALUE: 0
PIF_VALUE: 16
PIF_VALUE: 13
PIF_VALUE: 12
PIF_VALUE: 15
PIF_VALUE: 0
PIF_VALUE: 15
PIF_VALUE: 0
PIF_VALUE: 17
PIF_VALUE: 16
PIF_VALUE: 15
PIF_VALUE: 16
PIF_VALUE: 13
PIF_VALUE: 16
PIF_VALUE: 13
PIF_VALUE: 16
PIF_VALUE: 13
PIF_VALUE: 16
PIF_VALUE: 16
PIF_VALUE: 17
PIF_VALUE: 2
PIF_VALUE: 15
PIF_VALUE: 15
PIF_VALUE: 16
PIF_VALUE: 14
PIF_VALUE: 16
PIF_VALUE: 15
PIF_VALUE: 16
PIF_VALUE: 17
PIF_VALUE: 15
PIF_VALUE: 16
PIF_VALUE: 0
PIF_VALUE: 0
PIF_VALUE: 15
PIF_VALUE: 3
PIF_VALUE: 16
PIF_VALUE: 15
PIF_VALUE: 15
PIF_VALUE: 16
PIF_VALUE: 15
PIF_VALUE: 15
PIF_VALUE: 17
PIF_VALUE: 15
PIF_VALUE: 3
PIF_VALUE: 5
PIF_VALUE: 16
PIF_VALUE: 17
PIF_VALUE: 16
PIF_VALUE: 17
PIF_VALUE: 15
PIF_VALUE: 4
PIF_VALUE: 14
PIF_VALUE: 16
PIF_VALUE: 16
PIF_VALUE: 15
PIF_VALUE: 16
PIF_VALUE: 15
PIF_VALUE: 16
PIF_VALUE: 15
PIF_VALUE: 15
PIF_VALUE: 22
PIF_VALUE: 17
PIF_VALUE: 15
PIF_VALUE: 16
PIF_VALUE: 16
PIF_VALUE: 19
PIF_VALUE: 15
PIF_VALUE: 16
PIF_VALUE: 16
PIF_VALUE: 17
PIF_VALUE: 16
PIF_VALUE: 15
PIF_VALUE: 16
PIF_VALUE: 15
PIF_VALUE: 16
PIF_VALUE: 17
PIF_VALUE: 13
PIF_VALUE: 0
PIF_VALUE: 1
PIF_VALUE: 16
PIF_VALUE: 16
PIF_VALUE: 3
PIF_VALUE: 15
PIF_VALUE: 14
PIF_VALUE: 15
PIF_VALUE: 1
PIF_VALUE: 16
PIF_VALUE: 13
PIF_VALUE: 15
PIF_VALUE: 0
PIF_VALUE: 16
PIF_VALUE: 0
PIF_VALUE: 16
PIF_VALUE: 17
PIF_VALUE: 16
PIF_VALUE: 15
PIF_VALUE: 17
PIF_VALUE: 16
PIF_VALUE: 17
PIF_VALUE: 15
PIF_VALUE: 16
PIF_VALUE: 0
PIF_VALUE: 15
PIF_VALUE: 15
PIF_VALUE: 16
PIF_VALUE: 15
PIF_VALUE: 0
PIF_VALUE: 1
PIF_VALUE: 16
PIF_VALUE: 17
PIF_VALUE: 15
PIF_VALUE: 16
PIF_VALUE: 16
PIF_VALUE: 17
PIF_VALUE: 16
PIF_VALUE: 16
PIF_VALUE: 17
PIF_VALUE: 14
PIF_VALUE: 16
PIF_VALUE: 13
PIF_VALUE: 16
PIF_VALUE: 16
PIF_VALUE: 1
PIF_VALUE: 16
PIF_VALUE: 16
PIF_VALUE: 12
PIF_VALUE: 1
PIF_VALUE: 16
PIF_VALUE: 1
PIF_VALUE: 16
PIF_VALUE: 16
PIF_VALUE: 15
PIF_VALUE: 15
PIF_VALUE: 16
PIF_VALUE: 16
PIF_VALUE: 17
PIF_VALUE: 16
PIF_VALUE: 17
PIF_VALUE: 2
PIF_VALUE: 14
PIF_VALUE: 15
PIF_VALUE: 2
PIF_VALUE: 15
PIF_VALUE: 1
PIF_VALUE: 15
PIF_VALUE: 14
PIF_VALUE: 1
PIF_VALUE: 16
PIF_VALUE: 15
PIF_VALUE: 15
PIF_VALUE: 16

## 2018-05-31 ASSESSMENT — PAIN DESCRIPTION - ONSET
ONSET: ON-GOING

## 2018-05-31 ASSESSMENT — PAIN DESCRIPTION - DESCRIPTORS
DESCRIPTORS: ACHING
DESCRIPTORS: HEADACHE
DESCRIPTORS: HEADACHE
DESCRIPTORS: ACHING

## 2018-05-31 ASSESSMENT — PAIN DESCRIPTION - FREQUENCY
FREQUENCY: CONTINUOUS
FREQUENCY: INTERMITTENT
FREQUENCY: CONTINUOUS
FREQUENCY: CONTINUOUS

## 2018-05-31 ASSESSMENT — PAIN DESCRIPTION - ORIENTATION
ORIENTATION: RIGHT
ORIENTATION: ANTERIOR
ORIENTATION: RIGHT;LEFT;UPPER
ORIENTATION: MID

## 2018-05-31 ASSESSMENT — PAIN SCALES - GENERAL
PAINLEVEL_OUTOF10: 5
PAINLEVEL_OUTOF10: 5
PAINLEVEL_OUTOF10: 3
PAINLEVEL_OUTOF10: 5
PAINLEVEL_OUTOF10: 6
PAINLEVEL_OUTOF10: 4
PAINLEVEL_OUTOF10: 6
PAINLEVEL_OUTOF10: 0
PAINLEVEL_OUTOF10: 5

## 2018-05-31 ASSESSMENT — PAIN DESCRIPTION - PAIN TYPE
TYPE: ACUTE PAIN
TYPE: NEUROPATHIC PAIN
TYPE: ACUTE PAIN
TYPE: ACUTE PAIN

## 2018-05-31 ASSESSMENT — PAIN DESCRIPTION - LOCATION
LOCATION: HEAD
LOCATION: SHOULDER
LOCATION: SHOULDER
LOCATION: HEAD

## 2018-05-31 ASSESSMENT — PAIN DESCRIPTION - PROGRESSION
CLINICAL_PROGRESSION: NOT CHANGED

## 2018-05-31 ASSESSMENT — ENCOUNTER SYMPTOMS: SHORTNESS OF BREATH: 0

## 2018-05-31 ASSESSMENT — PAIN - FUNCTIONAL ASSESSMENT: PAIN_FUNCTIONAL_ASSESSMENT: 0-10

## 2018-05-31 ASSESSMENT — LIFESTYLE VARIABLES: SMOKING_STATUS: 1

## 2018-05-31 ASSESSMENT — PAIN DESCRIPTION - DIRECTION: RADIATING_TOWARDS: HANDS

## 2018-06-01 LAB
ABSOLUTE EOS #: <0.03 K/UL (ref 0–0.44)
ABSOLUTE IMMATURE GRANULOCYTE: 0.05 K/UL (ref 0–0.3)
ABSOLUTE LYMPH #: 2.52 K/UL (ref 1.1–3.7)
ABSOLUTE MONO #: 0.98 K/UL (ref 0.1–1.2)
ANION GAP SERPL CALCULATED.3IONS-SCNC: 10 MMOL/L (ref 9–17)
BASOPHILS # BLD: 0 % (ref 0–2)
BASOPHILS ABSOLUTE: <0.03 K/UL (ref 0–0.2)
BUN BLDV-MCNC: 6 MG/DL (ref 6–20)
BUN/CREAT BLD: ABNORMAL (ref 9–20)
CALCIUM SERPL-MCNC: 7.6 MG/DL (ref 8.6–10.4)
CHLORIDE BLD-SCNC: 112 MMOL/L (ref 98–107)
CO2: 17 MMOL/L (ref 20–31)
CREAT SERPL-MCNC: 0.54 MG/DL (ref 0.5–0.9)
DIFFERENTIAL TYPE: ABNORMAL
EOSINOPHILS RELATIVE PERCENT: 0 % (ref 1–4)
GFR AFRICAN AMERICAN: >60 ML/MIN
GFR NON-AFRICAN AMERICAN: >60 ML/MIN
GFR SERPL CREATININE-BSD FRML MDRD: ABNORMAL ML/MIN/{1.73_M2}
GFR SERPL CREATININE-BSD FRML MDRD: ABNORMAL ML/MIN/{1.73_M2}
GLUCOSE BLD-MCNC: 109 MG/DL (ref 70–99)
HCT VFR BLD CALC: 30.3 % (ref 36.3–47.1)
HCT VFR BLD CALC: 31.6 % (ref 36.3–47.1)
HEMOGLOBIN: 10.2 G/DL (ref 11.9–15.1)
HEMOGLOBIN: 10.6 G/DL (ref 11.9–15.1)
IMMATURE GRANULOCYTES: 0 %
LYMPHOCYTES # BLD: 19 % (ref 24–43)
MCH RBC QN AUTO: 32.1 PG (ref 25.2–33.5)
MCHC RBC AUTO-ENTMCNC: 33.7 G/DL (ref 28.4–34.8)
MCV RBC AUTO: 95.3 FL (ref 82.6–102.9)
MONOCYTES # BLD: 7 % (ref 3–12)
NRBC AUTOMATED: 0 PER 100 WBC
PDW BLD-RTO: 13 % (ref 11.8–14.4)
PLATELET # BLD: 194 K/UL (ref 138–453)
PLATELET ESTIMATE: ABNORMAL
PMV BLD AUTO: 9.1 FL (ref 8.1–13.5)
POTASSIUM SERPL-SCNC: 3.9 MMOL/L (ref 3.7–5.3)
RBC # BLD: 3.18 M/UL (ref 3.95–5.11)
RBC # BLD: ABNORMAL 10*6/UL
SEG NEUTROPHILS: 73 % (ref 36–65)
SEGMENTED NEUTROPHILS ABSOLUTE COUNT: 9.74 K/UL (ref 1.5–8.1)
SODIUM BLD-SCNC: 139 MMOL/L (ref 135–144)
WBC # BLD: 13.3 K/UL (ref 3.5–11.3)
WBC # BLD: ABNORMAL 10*3/UL

## 2018-06-01 PROCEDURE — 2580000003 HC RX 258: Performed by: EMERGENCY MEDICINE

## 2018-06-01 PROCEDURE — 99233 SBSQ HOSP IP/OBS HIGH 50: CPT | Performed by: PSYCHIATRY & NEUROLOGY

## 2018-06-01 PROCEDURE — 2000000003 HC NEURO ICU R&B

## 2018-06-01 PROCEDURE — 36415 COLL VENOUS BLD VENIPUNCTURE: CPT

## 2018-06-01 PROCEDURE — 6370000000 HC RX 637 (ALT 250 FOR IP): Performed by: NURSE PRACTITIONER

## 2018-06-01 PROCEDURE — 85014 HEMATOCRIT: CPT

## 2018-06-01 PROCEDURE — 6370000000 HC RX 637 (ALT 250 FOR IP): Performed by: PSYCHIATRY & NEUROLOGY

## 2018-06-01 PROCEDURE — 80048 BASIC METABOLIC PNL TOTAL CA: CPT

## 2018-06-01 PROCEDURE — 6360000002 HC RX W HCPCS: Performed by: PSYCHIATRY & NEUROLOGY

## 2018-06-01 PROCEDURE — 6360000002 HC RX W HCPCS: Performed by: EMERGENCY MEDICINE

## 2018-06-01 PROCEDURE — 85025 COMPLETE CBC W/AUTO DIFF WBC: CPT

## 2018-06-01 PROCEDURE — 2580000003 HC RX 258: Performed by: PSYCHIATRY & NEUROLOGY

## 2018-06-01 PROCEDURE — 93926 LOWER EXTREMITY STUDY: CPT

## 2018-06-01 PROCEDURE — 94762 N-INVAS EAR/PLS OXIMTRY CONT: CPT

## 2018-06-01 PROCEDURE — 85018 HEMOGLOBIN: CPT

## 2018-06-01 RX ORDER — 0.9 % SODIUM CHLORIDE 0.9 %
500 INTRAVENOUS SOLUTION INTRAVENOUS ONCE
Status: COMPLETED | OUTPATIENT
Start: 2018-06-01 | End: 2018-06-01

## 2018-06-01 RX ORDER — NICOTINE 21 MG/24HR
1 PATCH, TRANSDERMAL 24 HOURS TRANSDERMAL DAILY
Status: DISCONTINUED | OUTPATIENT
Start: 2018-06-01 | End: 2018-06-02 | Stop reason: HOSPADM

## 2018-06-01 RX ORDER — CALCIUM GLUCONATE 94 MG/ML
2 INJECTION, SOLUTION INTRAVENOUS ONCE
Status: DISCONTINUED | OUTPATIENT
Start: 2018-06-01 | End: 2018-06-01

## 2018-06-01 RX ORDER — DIPHENHYDRAMINE HYDROCHLORIDE 50 MG/ML
25 INJECTION INTRAMUSCULAR; INTRAVENOUS ONCE
Status: COMPLETED | OUTPATIENT
Start: 2018-06-01 | End: 2018-06-01

## 2018-06-01 RX ADMIN — LEVOTHYROXINE SODIUM 25 MCG: 25 TABLET ORAL at 09:25

## 2018-06-01 RX ADMIN — SIMVASTATIN 40 MG: 40 TABLET, FILM COATED ORAL at 20:45

## 2018-06-01 RX ADMIN — PROCHLORPERAZINE EDISYLATE 10 MG: 5 INJECTION INTRAMUSCULAR; INTRAVENOUS at 16:42

## 2018-06-01 RX ADMIN — DIPHENHYDRAMINE HYDROCHLORIDE 25 MG: 50 INJECTION, SOLUTION INTRAMUSCULAR; INTRAVENOUS at 16:42

## 2018-06-01 RX ADMIN — ASPIRIN 81 MG: 81 TABLET, COATED ORAL at 09:25

## 2018-06-01 RX ADMIN — CALCIUM GLUCONATE 2 G: 98 INJECTION, SOLUTION INTRAVENOUS at 11:41

## 2018-06-01 RX ADMIN — CLOPIDOGREL 75 MG: 75 TABLET, FILM COATED ORAL at 09:25

## 2018-06-01 RX ADMIN — SODIUM CHLORIDE 500 ML: 9 INJECTION, SOLUTION INTRAVENOUS at 00:16

## 2018-06-01 ASSESSMENT — PAIN DESCRIPTION - FREQUENCY: FREQUENCY: INTERMITTENT

## 2018-06-01 ASSESSMENT — PAIN SCALES - GENERAL
PAINLEVEL_OUTOF10: 0

## 2018-06-01 ASSESSMENT — PAIN DESCRIPTION - PAIN TYPE: TYPE: CHRONIC PAIN

## 2018-06-01 ASSESSMENT — PAIN DESCRIPTION - DESCRIPTORS: DESCRIPTORS: ACHING;DULL

## 2018-06-01 ASSESSMENT — PAIN DESCRIPTION - PROGRESSION: CLINICAL_PROGRESSION: GRADUALLY IMPROVING

## 2018-06-01 ASSESSMENT — PAIN DESCRIPTION - LOCATION: LOCATION: BACK

## 2018-06-01 ASSESSMENT — PAIN DESCRIPTION - ONSET: ONSET: ON-GOING

## 2018-06-02 VITALS
WEIGHT: 139 LBS | BODY MASS INDEX: 25.58 KG/M2 | HEIGHT: 62 IN | RESPIRATION RATE: 17 BRPM | SYSTOLIC BLOOD PRESSURE: 118 MMHG | OXYGEN SATURATION: 100 % | DIASTOLIC BLOOD PRESSURE: 73 MMHG | HEART RATE: 63 BPM | TEMPERATURE: 98.1 F

## 2018-06-02 LAB
ABSOLUTE EOS #: 0.11 K/UL (ref 0–0.44)
ABSOLUTE IMMATURE GRANULOCYTE: 0.04 K/UL (ref 0–0.3)
ABSOLUTE LYMPH #: 3.45 K/UL (ref 1.1–3.7)
ABSOLUTE MONO #: 0.73 K/UL (ref 0.1–1.2)
ANION GAP SERPL CALCULATED.3IONS-SCNC: 9 MMOL/L (ref 9–17)
BASOPHILS # BLD: 0 % (ref 0–2)
BASOPHILS ABSOLUTE: 0.03 K/UL (ref 0–0.2)
BUN BLDV-MCNC: 7 MG/DL (ref 6–20)
BUN/CREAT BLD: ABNORMAL (ref 9–20)
CALCIUM SERPL-MCNC: 7.7 MG/DL (ref 8.6–10.4)
CHLORIDE BLD-SCNC: 109 MMOL/L (ref 98–107)
CO2: 19 MMOL/L (ref 20–31)
CREAT SERPL-MCNC: 0.53 MG/DL (ref 0.5–0.9)
DIFFERENTIAL TYPE: ABNORMAL
EOSINOPHILS RELATIVE PERCENT: 1 % (ref 1–4)
GFR AFRICAN AMERICAN: >60 ML/MIN
GFR NON-AFRICAN AMERICAN: >60 ML/MIN
GFR SERPL CREATININE-BSD FRML MDRD: ABNORMAL ML/MIN/{1.73_M2}
GFR SERPL CREATININE-BSD FRML MDRD: ABNORMAL ML/MIN/{1.73_M2}
GLUCOSE BLD-MCNC: 89 MG/DL (ref 70–99)
HCT VFR BLD CALC: 31.4 % (ref 36.3–47.1)
HEMOGLOBIN: 10.4 G/DL (ref 11.9–15.1)
IMMATURE GRANULOCYTES: 0 %
LYMPHOCYTES # BLD: 29 % (ref 24–43)
MCH RBC QN AUTO: 31.9 PG (ref 25.2–33.5)
MCHC RBC AUTO-ENTMCNC: 33.1 G/DL (ref 28.4–34.8)
MCV RBC AUTO: 96.3 FL (ref 82.6–102.9)
MONOCYTES # BLD: 6 % (ref 3–12)
NRBC AUTOMATED: 0 PER 100 WBC
PDW BLD-RTO: 13 % (ref 11.8–14.4)
PLATELET # BLD: 189 K/UL (ref 138–453)
PLATELET ESTIMATE: ABNORMAL
PMV BLD AUTO: 9.1 FL (ref 8.1–13.5)
POTASSIUM SERPL-SCNC: 3.6 MMOL/L (ref 3.7–5.3)
RBC # BLD: 3.26 M/UL (ref 3.95–5.11)
RBC # BLD: ABNORMAL 10*6/UL
SEG NEUTROPHILS: 64 % (ref 36–65)
SEGMENTED NEUTROPHILS ABSOLUTE COUNT: 7.38 K/UL (ref 1.5–8.1)
SODIUM BLD-SCNC: 137 MMOL/L (ref 135–144)
WBC # BLD: 11.7 K/UL (ref 3.5–11.3)
WBC # BLD: ABNORMAL 10*3/UL

## 2018-06-02 PROCEDURE — 80048 BASIC METABOLIC PNL TOTAL CA: CPT

## 2018-06-02 PROCEDURE — 6370000000 HC RX 637 (ALT 250 FOR IP): Performed by: PSYCHIATRY & NEUROLOGY

## 2018-06-02 PROCEDURE — 6370000000 HC RX 637 (ALT 250 FOR IP): Performed by: NURSE PRACTITIONER

## 2018-06-02 PROCEDURE — 99233 SBSQ HOSP IP/OBS HIGH 50: CPT | Performed by: PSYCHIATRY & NEUROLOGY

## 2018-06-02 PROCEDURE — 36415 COLL VENOUS BLD VENIPUNCTURE: CPT

## 2018-06-02 PROCEDURE — 85025 COMPLETE CBC W/AUTO DIFF WBC: CPT

## 2018-06-02 PROCEDURE — 94762 N-INVAS EAR/PLS OXIMTRY CONT: CPT

## 2018-06-02 PROCEDURE — 99238 HOSP IP/OBS DSCHRG MGMT 30/<: CPT | Performed by: PSYCHIATRY & NEUROLOGY

## 2018-06-02 RX ORDER — CLOPIDOGREL BISULFATE 75 MG/1
75 TABLET ORAL DAILY
Qty: 30 TABLET | Refills: 3 | Status: ON HOLD | OUTPATIENT
Start: 2018-06-03 | End: 2018-10-12 | Stop reason: HOSPADM

## 2018-06-02 RX ORDER — ASPIRIN 81 MG/1
81 TABLET ORAL DAILY
Qty: 30 TABLET | Refills: 3 | Status: SHIPPED | OUTPATIENT
Start: 2018-06-03

## 2018-06-02 RX ADMIN — ASPIRIN 81 MG: 81 TABLET, COATED ORAL at 10:08

## 2018-06-02 RX ADMIN — ACETAMINOPHEN 650 MG: 325 TABLET ORAL at 10:08

## 2018-06-02 RX ADMIN — LEVOTHYROXINE SODIUM 25 MCG: 25 TABLET ORAL at 10:08

## 2018-06-02 RX ADMIN — CLOPIDOGREL 75 MG: 75 TABLET, FILM COATED ORAL at 10:08

## 2018-06-02 RX ADMIN — ACETAMINOPHEN 650 MG: 325 TABLET ORAL at 02:56

## 2018-06-02 ASSESSMENT — PAIN SCALES - GENERAL
PAINLEVEL_OUTOF10: 2
PAINLEVEL_OUTOF10: 2

## 2018-06-04 ENCOUNTER — TELEPHONE (OUTPATIENT)
Dept: FAMILY MEDICINE CLINIC | Age: 43
End: 2018-06-04

## 2018-06-19 ENCOUNTER — OFFICE VISIT (OUTPATIENT)
Dept: NEUROLOGY | Age: 43
End: 2018-06-19
Payer: COMMERCIAL

## 2018-06-19 VITALS
BODY MASS INDEX: 25.4 KG/M2 | DIASTOLIC BLOOD PRESSURE: 74 MMHG | SYSTOLIC BLOOD PRESSURE: 118 MMHG | WEIGHT: 138 LBS | HEIGHT: 62 IN | HEART RATE: 83 BPM

## 2018-06-19 DIAGNOSIS — Z09 HOSPITAL DISCHARGE FOLLOW-UP: ICD-10-CM

## 2018-06-19 DIAGNOSIS — Z98.890 POST-OPERATIVE STATE: ICD-10-CM

## 2018-06-19 DIAGNOSIS — I67.1 CEREBRAL ANEURYSM: Primary | ICD-10-CM

## 2018-06-19 PROCEDURE — 99212 OFFICE O/P EST SF 10 MIN: CPT | Performed by: NEUROLOGICAL SURGERY

## 2018-06-21 ENCOUNTER — TELEPHONE (OUTPATIENT)
Dept: NEUROLOGY | Age: 43
End: 2018-06-21

## 2018-06-22 RX ORDER — METHYLPREDNISOLONE 4 MG/1
TABLET ORAL
Qty: 1 KIT | Refills: 0 | Status: SHIPPED | OUTPATIENT
Start: 2018-06-22 | End: 2018-06-25

## 2018-07-12 ENCOUNTER — OFFICE VISIT (OUTPATIENT)
Dept: FAMILY MEDICINE CLINIC | Age: 43
End: 2018-07-12
Payer: COMMERCIAL

## 2018-07-12 VITALS
SYSTOLIC BLOOD PRESSURE: 136 MMHG | BODY MASS INDEX: 25.79 KG/M2 | HEART RATE: 60 BPM | WEIGHT: 141 LBS | DIASTOLIC BLOOD PRESSURE: 80 MMHG

## 2018-07-12 DIAGNOSIS — E03.9 HYPOTHYROIDISM, UNSPECIFIED TYPE: ICD-10-CM

## 2018-07-12 DIAGNOSIS — I60.8 ANEURYSMAL SUBARACHNOID HEMORRHAGE (HCC): Primary | ICD-10-CM

## 2018-07-12 DIAGNOSIS — Z72.0 TOBACCO USE: ICD-10-CM

## 2018-07-12 PROCEDURE — 99214 OFFICE O/P EST MOD 30 MIN: CPT | Performed by: FAMILY MEDICINE

## 2018-07-12 RX ORDER — BUPROPION HYDROCHLORIDE 100 MG/1
100 TABLET ORAL 2 TIMES DAILY
Qty: 60 TABLET | Refills: 3 | Status: SHIPPED | OUTPATIENT
Start: 2018-07-12 | End: 2018-08-13 | Stop reason: DRUGHIGH

## 2018-07-12 ASSESSMENT — ENCOUNTER SYMPTOMS
ABDOMINAL PAIN: 0
SORE THROAT: 0
COUGH: 0
SHORTNESS OF BREATH: 0
WHEEZING: 0
RHINORRHEA: 0

## 2018-07-12 ASSESSMENT — PATIENT HEALTH QUESTIONNAIRE - PHQ9
2. FEELING DOWN, DEPRESSED OR HOPELESS: 0
SUM OF ALL RESPONSES TO PHQ9 QUESTIONS 1 & 2: 0
1. LITTLE INTEREST OR PLEASURE IN DOING THINGS: 0
SUM OF ALL RESPONSES TO PHQ QUESTIONS 1-9: 0

## 2018-07-12 NOTE — PROGRESS NOTES
light.   Neck: Neck supple. Carotid bruit is not present. No Brudzinski's sign and no Kernig's sign noted. No thyromegaly present. Cardiovascular: Normal rate, regular rhythm, S1 normal and S2 normal.    No murmur heard. Pulmonary/Chest: Breath sounds normal. She has no wheezes. She has no rhonchi. She has no rales. She exhibits no tenderness. Abdominal: Soft. There is no hepatosplenomegaly. There is no tenderness. Musculoskeletal: She exhibits no edema. Lymphadenopathy:     She has no cervical adenopathy. She has no axillary adenopathy. Neurological: She is oriented to person, place, and time. She has normal strength. No cranial nerve deficit. She displays a negative Romberg sign. Reflex Scores:       Bicep reflexes are 2+ on the right side and 2+ on the left side. Patellar reflexes are 2+ on the right side and 2+ on the left side. Vitals reviewed. Assessment:      Diagnosis Orders   1. Aneurysmal subarachnoid hemorrhage ([address]     2. Tobacco use  buPROPion (WELLBUTRIN) 100 MG tablet   3. Hypothyroidism, unspecified type              POC Testing Results (If Applicable):  No results found for this visit on 07/12/18. Plan:   We'll start Wellbutrin. Start at 100 mg twice daily. Actually she will start 100 mg at bedtime for a week and then go to 100 mg twice daily. She should add a nicotine patch as that can really not hurt anything. Recheck in about 4 weeks which time we will consider increasing Wellbutrin to 300 mg once daily. Return sooner if any problems      Orders Given:  No orders of the defined types were placed in this encounter. Prescriptions:    Orders Placed This Encounter   Medications    buPROPion (WELLBUTRIN) 100 MG tablet     Sig: Take 1 tablet by mouth 2 times daily     Dispense:  60 tablet     Refill:  3        Return in about 4 weeks (around 8/9/2018). Electronically signed by Leon Jennings MD on 7/15/2018.         **This report has been created using voice recognition software. It may contain minor errors which are inherent in voice recognition technology. **

## 2018-08-13 ENCOUNTER — OFFICE VISIT (OUTPATIENT)
Dept: FAMILY MEDICINE CLINIC | Age: 43
End: 2018-08-13
Payer: COMMERCIAL

## 2018-08-13 VITALS
WEIGHT: 141 LBS | SYSTOLIC BLOOD PRESSURE: 104 MMHG | OXYGEN SATURATION: 98 % | BODY MASS INDEX: 25.79 KG/M2 | DIASTOLIC BLOOD PRESSURE: 76 MMHG | HEART RATE: 77 BPM

## 2018-08-13 DIAGNOSIS — Z72.0 TOBACCO USE: Primary | ICD-10-CM

## 2018-08-13 PROCEDURE — 99213 OFFICE O/P EST LOW 20 MIN: CPT | Performed by: FAMILY MEDICINE

## 2018-08-13 RX ORDER — BUPROPION HYDROCHLORIDE 300 MG/1
300 TABLET ORAL EVERY MORNING
Qty: 30 TABLET | Refills: 5 | Status: SHIPPED | OUTPATIENT
Start: 2018-08-13 | End: 2018-10-11 | Stop reason: ALTCHOICE

## 2018-08-14 ASSESSMENT — ENCOUNTER SYMPTOMS
ABDOMINAL PAIN: 0
SHORTNESS OF BREATH: 0
COUGH: 0
RHINORRHEA: 0
WHEEZING: 0
SORE THROAT: 0

## 2018-08-14 NOTE — PROGRESS NOTES
normal.   Left Ear: Tympanic membrane normal.   Nose: No mucosal edema (Mild pallor). Right sinus exhibits no maxillary sinus tenderness and no frontal sinus tenderness. Left sinus exhibits no maxillary sinus tenderness and no frontal sinus tenderness. Mouth/Throat: No oropharyngeal exudate or posterior oropharyngeal erythema. Neck: Neck supple. Cardiovascular: Normal rate, regular rhythm, S1 normal and S2 normal.    No murmur heard. Pulmonary/Chest: Breath sounds normal. She has no wheezes. She has no rhonchi. She has no rales. She exhibits no tenderness. Abdominal: Soft. Musculoskeletal: She exhibits no edema. Lymphadenopathy:     She has no cervical adenopathy. She has no axillary adenopathy. Neurological: She is oriented to person, place, and time. No cranial nerve deficit. Vitals reviewed. Assessment:      Diagnosis Orders   1. Tobacco use  buPROPion (WELLBUTRIN XL) 300 MG extended release tablet            POC Testing Results (If Applicable):  No results found for this visit on 08/13/18. Plan: At this time, we will increase her bupropion to 300 mg once daily. Continue with the nicotine patch. Continue in her efforts to remain smoke free. She might call the [place]. I am sure there is one for smoking cessation. She has a couple apps on her phone for smoking cessation. Cognitive behavioral therapy is also an option; she will consider that. She may recheck in 3 months, sooner if any problems. Orders Given:  No orders of the defined types were placed in this encounter. Prescriptions:    Orders Placed This Encounter   Medications    buPROPion (WELLBUTRIN XL) 300 MG extended release tablet     Sig: Take 1 tablet by mouth every morning     Dispense:  30 tablet     Refill:  5        Return in about 3 months (around 11/13/2018).         Lilian Snyder am scribing for Sarai Joy MD 8/14/2018 at 3:22 PM.

## 2018-09-10 ENCOUNTER — HOSPITAL ENCOUNTER (OUTPATIENT)
Dept: MRI IMAGING | Age: 43
Discharge: HOME | End: 2018-09-12
Payer: COMMERCIAL

## 2018-09-10 DIAGNOSIS — I67.1 CEREBRAL ANEURYSM: ICD-10-CM

## 2018-09-10 PROCEDURE — 6360000004 HC RX CONTRAST MEDICATION: Performed by: NEUROLOGICAL SURGERY

## 2018-09-10 PROCEDURE — A9576 INJ PROHANCE MULTIPACK: HCPCS | Performed by: NEUROLOGICAL SURGERY

## 2018-09-10 PROCEDURE — 70546 MR ANGIOGRAPH HEAD W/O&W/DYE: CPT

## 2018-09-10 RX ADMIN — GADOTERIDOL 13 ML: 279.3 INJECTION, SOLUTION INTRAVENOUS at 16:41

## 2018-09-18 ENCOUNTER — OFFICE VISIT (OUTPATIENT)
Dept: NEUROLOGY | Age: 43
End: 2018-09-18
Payer: COMMERCIAL

## 2018-09-18 VITALS
BODY MASS INDEX: 25.95 KG/M2 | SYSTOLIC BLOOD PRESSURE: 125 MMHG | DIASTOLIC BLOOD PRESSURE: 79 MMHG | HEIGHT: 62 IN | HEART RATE: 83 BPM | WEIGHT: 141 LBS

## 2018-09-18 DIAGNOSIS — I60.7 RUPTURED CEREBRAL ANEURYSM (HCC): ICD-10-CM

## 2018-09-18 DIAGNOSIS — I60.8 ANEURYSMAL SUBARACHNOID HEMORRHAGE (HCC): ICD-10-CM

## 2018-09-18 DIAGNOSIS — I60.9 SAH (SUBARACHNOID HEMORRHAGE) (HCC): ICD-10-CM

## 2018-09-18 DIAGNOSIS — I67.1 CEREBRAL ANEURYSM: Primary | ICD-10-CM

## 2018-09-18 PROCEDURE — 99215 OFFICE O/P EST HI 40 MIN: CPT | Performed by: PSYCHIATRY & NEUROLOGY

## 2018-09-18 RX ORDER — ESCITALOPRAM OXALATE 10 MG/1
10 TABLET ORAL DAILY
Qty: 30 TABLET | Refills: 5 | Status: SHIPPED | OUTPATIENT
Start: 2018-09-18 | End: 2018-10-11

## 2018-09-18 NOTE — PROGRESS NOTES
MD Tayo    Scheduled Meds:  Continuous Infusions:  PRN Meds:.  Past Medical History   has a past medical history of Caffeinism; Generalized headaches; Low back pain; Migraine; Patient in clinical research study; Patient in clinical research study; Recurrent sinus infections; Ruptured middle cerebral artery aneurysm ([facility]); SAH (subarachnoid hemorrhage) ([facility] [address] and Tobacco use. Past Surgical History   has a past surgical history that includes other surgical history (04/02/2013); Tubal ligation (05/24/2011); Brain aneurysm surgery (01/12/2018); and other surgical history (05/31/2018). Social History   reports that she has been smoking. She started smoking about 27 years ago. She has been smoking about 1.00 pack per day. She has never used smokeless tobacco.   reports that she does not drink alcohol. reports that she does not use drugs. Family History  family history includes Alcohol Abuse in her father; Breast Cancer in her paternal grandmother; Cancer in her maternal grandmother and sister; Rema Brian in her maternal grandfather; Other in her father, mother, and another family member. Review of Systems:  CONSTITUTIONAL:  negative for fevers, chills, fatigue and malaise    EYES:  negative for double vision, blurred vision and photophobia     HEENT:  negative for tinnitus, epistaxis and sore throat    RESPIRATORY:  negative for cough, shortness of breath, wheezing    CARDIOVASCULAR:  negative for chest pain, palpitations, syncope, edema    GASTROINTESTINAL:  negative for nausea, vomiting    GENITOURINARY:  negative for incontinence    MUSCULOSKELETAL:  negative for neck or back pain    NEUROLOGICAL:  Negative for weakness and tingling  negative for headaches and dizziness    PSYCHIATRIC:  negative for anxiety      Review of systems otherwise negative.       OBJECTIVE:   Vitals: /79 (Site: Right Upper Arm, Position: Sitting, Cuff Size: Small Adult)   Pulse 83   Ht 5' 2\" (1.575 m)   Wt 141 lb (64 kg) 1/26/2018. She returns to clinic alone today, doing well. 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 She continues to smoke, but has tried to quit -- she is \"not ready\" to attempt again. FU cerebral angiogram on 4/20/2018 showed residual neck of the previously coiled ruptured RMCA bifurcation aneurysm, with smaill untreated 1 mm x 2mm right Pcomm aneurysm; here for further counseling    The natural history of cerebral aneurysm rupture is discussed based on UCAS and ISUA data at length with the patient, and her family. Options, alternatives including open surgical clipping and coiling as well as observation were discussed with the patient and her family. The family was offered to have a full discussion with the neurosurgery, but the family is refusing at all to meet to discuss the clipping option. Patient underwent stent assisted coil embolization to the residual neck of the right MCA aneurysm on May 31, 2018      The risk and benefit of the transarterial aneurysm coiling with or without stenting was explained at length to the patient and her family, including but not limited to vessel injury, X-ray dye allergic reaction, kidney dysfunction, stroke and groin hematoma. does not have any pertinent problems on file. PLANS:   1. Patient would like endovascular therapy, plan Y-stenting MCA and pipeline right Pcomm   2. Adding Plavix and ASA 81 mg daily   3. Start Lexapro 10 mg   4. Plan to stent -assistance coil embolize the right ICA terminus aneurysm given the patient is young, and the shape of the aneurysm is irregular, h/o HTN, smoker, cut it into half 10-15 cig per day   5. SBP < 130/80 mm Hg  6. Encouraged her to quit smoking                             Thank you for the interesting evaluation. Further recommendations to follow. Consultation Visit Time:  40 minutes  Patient given educational materials - see patient instructions. Discussed use, benefit, and side effects of prescribed medications. Personally reviewed imaging with patients and all questions answered. Pt voiced understanding. Patient agreed with treatment plan. Follow up as directed below. Greater than 50% of the time was for counseling and providing answer to the patient question. The findings and the plan discussed with the patient and all her questions were answered. Thank you very much for your referral, please do not hesitate to contact me with any questions.     Sarah Figueredo MD Pager: [phone]  Stroke, [facility]  [facility]   [address]

## 2018-10-11 ENCOUNTER — HOSPITAL ENCOUNTER (INPATIENT)
Dept: INTERVENTIONAL RADIOLOGY/VASCULAR | Age: 43
LOS: 1 days | Discharge: HOME | DRG: 027 | End: 2018-10-12
Attending: PSYCHIATRY & NEUROLOGY | Admitting: PSYCHIATRY & NEUROLOGY
Payer: COMMERCIAL

## 2018-10-11 ENCOUNTER — ANESTHESIA EVENT (OUTPATIENT)
Dept: INTERVENTIONAL RADIOLOGY/VASCULAR | Age: 43
End: 2018-10-11

## 2018-10-11 ENCOUNTER — ANESTHESIA (OUTPATIENT)
Dept: INTERVENTIONAL RADIOLOGY/VASCULAR | Age: 43
End: 2018-10-11

## 2018-10-11 VITALS — DIASTOLIC BLOOD PRESSURE: 57 MMHG | TEMPERATURE: 96.7 F | SYSTOLIC BLOOD PRESSURE: 90 MMHG | OXYGEN SATURATION: 94 %

## 2018-10-11 DIAGNOSIS — I72.9 ANEURYSM (HCC): ICD-10-CM

## 2018-10-11 DIAGNOSIS — I67.1 ANEURYSM OF POSTERIOR COMMUNICATING ARTERY: Primary | ICD-10-CM

## 2018-10-11 LAB
ABO/RH: NORMAL
ACTIVATED CLOTTING TIME: 123 SEC (ref 79–149)
ACTIVATED CLOTTING TIME: 140 SEC (ref 79–149)
ACTIVATED CLOTTING TIME: 188 SEC (ref 79–149)
ACTIVATED CLOTTING TIME: 229 SEC (ref 79–149)
ACTIVATED CLOTTING TIME: 241 SEC (ref 79–149)
ACTIVATED CLOTTING TIME: 261 SEC (ref 79–149)
ANION GAP SERPL CALCULATED.3IONS-SCNC: 11 MMOL/L (ref 9–17)
ANTIBODY SCREEN: NEGATIVE
ARM BAND NUMBER: NORMAL
BUN BLDV-MCNC: 10 MG/DL (ref 6–20)
BUN/CREAT BLD: NORMAL (ref 9–20)
CALCIUM SERPL-MCNC: 9 MG/DL (ref 8.6–10.4)
CHLORIDE BLD-SCNC: 105 MMOL/L (ref 98–107)
CO2: 23 MMOL/L (ref 20–31)
COLLAGEN ADENOSINE-5'-DIPHOSPHATE (ADP) TIME: 92 SEC (ref 67–112)
COLLAGEN EPINEPHRINE TIME: 163 SEC (ref 85–172)
CREAT SERPL-MCNC: 0.66 MG/DL (ref 0.5–0.9)
EXPIRATION DATE: NORMAL
GFR AFRICAN AMERICAN: >60 ML/MIN
GFR NON-AFRICAN AMERICAN: >60 ML/MIN
GFR SERPL CREATININE-BSD FRML MDRD: NORMAL ML/MIN/{1.73_M2}
GFR SERPL CREATININE-BSD FRML MDRD: NORMAL ML/MIN/{1.73_M2}
GLUCOSE BLD-MCNC: 94 MG/DL (ref 70–99)
HCG, PREGNANCY URINE (POC): NEGATIVE
HCT VFR BLD CALC: 40.6 % (ref 36.3–47.1)
HEMOGLOBIN: 13.4 G/DL (ref 11.9–15.1)
INR BLD: 0.9
MCH RBC QN AUTO: 31.7 PG (ref 25.2–33.5)
MCHC RBC AUTO-ENTMCNC: 33 G/DL (ref 28.4–34.8)
MCV RBC AUTO: 96 FL (ref 82.6–102.9)
NRBC AUTOMATED: 0 PER 100 WBC
PARTIAL THROMBOPLASTIN TIME: 23.4 SEC (ref 20.5–30.5)
PDW BLD-RTO: 12.9 % (ref 11.8–14.4)
PLATELET # BLD: 258 K/UL (ref 138–453)
PLATELET FUNCTION INTERP: NORMAL
PMV BLD AUTO: 8.7 FL (ref 8.1–13.5)
POTASSIUM SERPL-SCNC: 4.2 MMOL/L (ref 3.7–5.3)
PROTHROMBIN TIME: 9.7 SEC (ref 9–12)
RBC # BLD: 4.23 M/UL (ref 3.95–5.11)
SODIUM BLD-SCNC: 139 MMOL/L (ref 135–144)
WBC # BLD: 11.1 K/UL (ref 3.5–11.3)

## 2018-10-11 PROCEDURE — 6360000002 HC RX W HCPCS: Performed by: NURSE PRACTITIONER

## 2018-10-11 PROCEDURE — 3700000001 HC ADD 15 MINUTES (ANESTHESIA)

## 2018-10-11 PROCEDURE — C1894 INTRO/SHEATH, NON-LASER: HCPCS

## 2018-10-11 PROCEDURE — 2720000010 HC SURG SUPPLY STERILE

## 2018-10-11 PROCEDURE — 6370000000 HC RX 637 (ALT 250 FOR IP): Performed by: NURSE PRACTITIONER

## 2018-10-11 PROCEDURE — C1769 GUIDE WIRE: HCPCS

## 2018-10-11 PROCEDURE — 2580000003 HC RX 258: Performed by: ANESTHESIOLOGY

## 2018-10-11 PROCEDURE — 85576 BLOOD PLATELET AGGREGATION: CPT

## 2018-10-11 PROCEDURE — 2580000003 HC RX 258: Performed by: NURSE PRACTITIONER

## 2018-10-11 PROCEDURE — 2780000010 HC IMPLANT OTHER

## 2018-10-11 PROCEDURE — 2580000003 HC RX 258: Performed by: NURSE ANESTHETIST, CERTIFIED REGISTERED

## 2018-10-11 PROCEDURE — 87641 MR-STAPH DNA AMP PROBE: CPT

## 2018-10-11 PROCEDURE — C1760 CLOSURE DEV, VASC: HCPCS

## 2018-10-11 PROCEDURE — 85610 PROTHROMBIN TIME: CPT

## 2018-10-11 PROCEDURE — 6360000002 HC RX W HCPCS: Performed by: NURSE ANESTHETIST, CERTIFIED REGISTERED

## 2018-10-11 PROCEDURE — 86900 BLOOD TYPING SEROLOGIC ABO: CPT

## 2018-10-11 PROCEDURE — 2709999900 HC NON-CHARGEABLE SUPPLY

## 2018-10-11 PROCEDURE — 80048 BASIC METABOLIC PNL TOTAL CA: CPT

## 2018-10-11 PROCEDURE — 2580000003 HC RX 258: Performed by: EMERGENCY MEDICINE

## 2018-10-11 PROCEDURE — 86901 BLOOD TYPING SEROLOGIC RH(D): CPT

## 2018-10-11 PROCEDURE — 85347 COAGULATION TIME ACTIVATED: CPT

## 2018-10-11 PROCEDURE — 6360000004 HC RX CONTRAST MEDICATION: Performed by: NURSE ANESTHETIST, CERTIFIED REGISTERED

## 2018-10-11 PROCEDURE — 6370000000 HC RX 637 (ALT 250 FOR IP): Performed by: PSYCHIATRY & NEUROLOGY

## 2018-10-11 PROCEDURE — 7100000000 HC PACU RECOVERY - FIRST 15 MIN

## 2018-10-11 PROCEDURE — 61624 TCAT PERM OCCLS/EMBOLJ CNS: CPT | Performed by: PSYCHIATRY & NEUROLOGY

## 2018-10-11 PROCEDURE — 36224 PLACE CATH CAROTD ART: CPT | Performed by: PSYCHIATRY & NEUROLOGY

## 2018-10-11 PROCEDURE — 85730 THROMBOPLASTIN TIME PARTIAL: CPT

## 2018-10-11 PROCEDURE — 86850 RBC ANTIBODY SCREEN: CPT

## 2018-10-11 PROCEDURE — 2000000003 HC NEURO ICU R&B

## 2018-10-11 PROCEDURE — 3700000000 HC ANESTHESIA ATTENDED CARE

## 2018-10-11 PROCEDURE — 75894 X-RAYS TRANSCATH THERAPY: CPT | Performed by: PSYCHIATRY & NEUROLOGY

## 2018-10-11 PROCEDURE — 6360000002 HC RX W HCPCS: Performed by: PSYCHIATRY & NEUROLOGY

## 2018-10-11 PROCEDURE — 6370000000 HC RX 637 (ALT 250 FOR IP): Performed by: INTERNAL MEDICINE

## 2018-10-11 PROCEDURE — 85027 COMPLETE CBC AUTOMATED: CPT

## 2018-10-11 PROCEDURE — 7100000001 HC PACU RECOVERY - ADDTL 15 MIN

## 2018-10-11 PROCEDURE — C1876 STENT, NON-COA/NON-COV W/DEL: HCPCS

## 2018-10-11 PROCEDURE — 03VG3DZ RESTRICTION OF INTRACRANIAL ARTERY WITH INTRALUMINAL DEVICE, PERCUTANEOUS APPROACH: ICD-10-PCS | Performed by: NEUROLOGICAL SURGERY

## 2018-10-11 PROCEDURE — 99291 CRITICAL CARE FIRST HOUR: CPT | Performed by: PSYCHIATRY & NEUROLOGY

## 2018-10-11 PROCEDURE — 84703 CHORIONIC GONADOTROPIN ASSAY: CPT

## 2018-10-11 PROCEDURE — 99223 1ST HOSP IP/OBS HIGH 75: CPT | Performed by: PSYCHIATRY & NEUROLOGY

## 2018-10-11 PROCEDURE — 75898 FOLLOW-UP ANGIOGRAPHY: CPT | Performed by: PSYCHIATRY & NEUROLOGY

## 2018-10-11 PROCEDURE — C1887 CATHETER, GUIDING: HCPCS

## 2018-10-11 PROCEDURE — 2500000003 HC RX 250 WO HCPCS: Performed by: NURSE ANESTHETIST, CERTIFIED REGISTERED

## 2018-10-11 PROCEDURE — 76377 3D RENDER W/INTRP POSTPROCES: CPT | Performed by: PSYCHIATRY & NEUROLOGY

## 2018-10-11 PROCEDURE — 36228 PLACE CATH INTRACRANIAL ART: CPT | Performed by: PSYCHIATRY & NEUROLOGY

## 2018-10-11 RX ORDER — SODIUM CHLORIDE 0.9 % (FLUSH) 0.9 %
10 SYRINGE (ML) INJECTION PRN
Status: DISCONTINUED | OUTPATIENT
Start: 2018-10-11 | End: 2018-10-12 | Stop reason: HOSPADM

## 2018-10-11 RX ORDER — IODIXANOL 270 MG/ML
169 INJECTION, SOLUTION INTRAVASCULAR
Status: COMPLETED | OUTPATIENT
Start: 2018-10-11 | End: 2018-10-11

## 2018-10-11 RX ORDER — PROPOFOL 10 MG/ML
INJECTION, EMULSION INTRAVENOUS PRN
Status: DISCONTINUED | OUTPATIENT
Start: 2018-10-11 | End: 2018-10-11 | Stop reason: SDUPTHER

## 2018-10-11 RX ORDER — OXYCODONE HYDROCHLORIDE AND ACETAMINOPHEN 5; 325 MG/1; MG/1
1 TABLET ORAL EVERY 4 HOURS PRN
Status: DISCONTINUED | OUTPATIENT
Start: 2018-10-11 | End: 2018-10-12 | Stop reason: HOSPADM

## 2018-10-11 RX ORDER — ACETAMINOPHEN 325 MG/1
650 TABLET ORAL EVERY 4 HOURS PRN
Status: DISCONTINUED | OUTPATIENT
Start: 2018-10-11 | End: 2018-10-12 | Stop reason: HOSPADM

## 2018-10-11 RX ORDER — CETIRIZINE HYDROCHLORIDE 10 MG/1
10 TABLET ORAL DAILY
Status: DISCONTINUED | OUTPATIENT
Start: 2018-10-11 | End: 2018-10-12 | Stop reason: HOSPADM

## 2018-10-11 RX ORDER — LIDOCAINE HYDROCHLORIDE 10 MG/ML
INJECTION, SOLUTION EPIDURAL; INFILTRATION; INTRACAUDAL; PERINEURAL PRN
Status: DISCONTINUED | OUTPATIENT
Start: 2018-10-11 | End: 2018-10-11 | Stop reason: SDUPTHER

## 2018-10-11 RX ORDER — LEVOTHYROXINE SODIUM 0.03 MG/1
25 TABLET ORAL DAILY
Status: DISCONTINUED | OUTPATIENT
Start: 2018-10-12 | End: 2018-10-12 | Stop reason: HOSPADM

## 2018-10-11 RX ORDER — SODIUM CHLORIDE, SODIUM LACTATE, POTASSIUM CHLORIDE, CALCIUM CHLORIDE 600; 310; 30; 20 MG/100ML; MG/100ML; MG/100ML; MG/100ML
INJECTION, SOLUTION INTRAVENOUS CONTINUOUS PRN
Status: DISCONTINUED | OUTPATIENT
Start: 2018-10-11 | End: 2018-10-11 | Stop reason: SDUPTHER

## 2018-10-11 RX ORDER — DEXAMETHASONE SODIUM PHOSPHATE 10 MG/ML
6 INJECTION INTRAMUSCULAR; INTRAVENOUS ONCE
Status: COMPLETED | OUTPATIENT
Start: 2018-10-11 | End: 2018-10-11

## 2018-10-11 RX ORDER — 0.9 % SODIUM CHLORIDE 0.9 %
500 INTRAVENOUS SOLUTION INTRAVENOUS ONCE
Status: COMPLETED | OUTPATIENT
Start: 2018-10-11 | End: 2018-10-12

## 2018-10-11 RX ORDER — FAMOTIDINE 20 MG/1
20 TABLET, FILM COATED ORAL 2 TIMES DAILY
Status: DISCONTINUED | OUTPATIENT
Start: 2018-10-11 | End: 2018-10-12 | Stop reason: HOSPADM

## 2018-10-11 RX ORDER — ALBUTEROL SULFATE 90 UG/1
2 AEROSOL, METERED RESPIRATORY (INHALATION) EVERY 4 HOURS PRN
Status: DISCONTINUED | OUTPATIENT
Start: 2018-10-11 | End: 2018-10-12 | Stop reason: HOSPADM

## 2018-10-11 RX ORDER — SODIUM CHLORIDE, SODIUM LACTATE, POTASSIUM CHLORIDE, CALCIUM CHLORIDE 600; 310; 30; 20 MG/100ML; MG/100ML; MG/100ML; MG/100ML
INJECTION, SOLUTION INTRAVENOUS CONTINUOUS
Status: DISCONTINUED | OUTPATIENT
Start: 2018-10-11 | End: 2018-10-12 | Stop reason: HOSPADM

## 2018-10-11 RX ORDER — FENTANYL CITRATE 50 UG/ML
25 INJECTION, SOLUTION INTRAMUSCULAR; INTRAVENOUS
Status: DISCONTINUED | OUTPATIENT
Start: 2018-10-11 | End: 2018-10-12 | Stop reason: HOSPADM

## 2018-10-11 RX ORDER — FENTANYL CITRATE 50 UG/ML
INJECTION, SOLUTION INTRAMUSCULAR; INTRAVENOUS PRN
Status: DISCONTINUED | OUTPATIENT
Start: 2018-10-11 | End: 2018-10-11 | Stop reason: SDUPTHER

## 2018-10-11 RX ORDER — FENTANYL CITRATE 50 UG/ML
50 INJECTION, SOLUTION INTRAMUSCULAR; INTRAVENOUS
Status: DISCONTINUED | OUTPATIENT
Start: 2018-10-11 | End: 2018-10-12 | Stop reason: HOSPADM

## 2018-10-11 RX ORDER — OXYCODONE HYDROCHLORIDE AND ACETAMINOPHEN 5; 325 MG/1; MG/1
2 TABLET ORAL EVERY 4 HOURS PRN
Status: DISCONTINUED | OUTPATIENT
Start: 2018-10-11 | End: 2018-10-12 | Stop reason: HOSPADM

## 2018-10-11 RX ORDER — ASPIRIN 81 MG/1
81 TABLET ORAL DAILY
Status: DISCONTINUED | OUTPATIENT
Start: 2018-10-12 | End: 2018-10-12 | Stop reason: HOSPADM

## 2018-10-11 RX ORDER — ROCURONIUM BROMIDE 10 MG/ML
INJECTION, SOLUTION INTRAVENOUS PRN
Status: DISCONTINUED | OUTPATIENT
Start: 2018-10-11 | End: 2018-10-11 | Stop reason: SDUPTHER

## 2018-10-11 RX ORDER — M-VIT,TX,IRON,MINS/CALC/FOLIC 27MG-0.4MG
1 TABLET ORAL DAILY
COMMUNITY
End: 2019-08-08 | Stop reason: ALTCHOICE

## 2018-10-11 RX ORDER — PROTAMINE SULFATE 10 MG/ML
INJECTION, SOLUTION INTRAVENOUS PRN
Status: DISCONTINUED | OUTPATIENT
Start: 2018-10-11 | End: 2018-10-11 | Stop reason: SDUPTHER

## 2018-10-11 RX ORDER — GLYCOPYRROLATE 1 MG/5 ML
SYRINGE (ML) INTRAVENOUS PRN
Status: DISCONTINUED | OUTPATIENT
Start: 2018-10-11 | End: 2018-10-11 | Stop reason: SDUPTHER

## 2018-10-11 RX ORDER — ONDANSETRON 2 MG/ML
4 INJECTION INTRAMUSCULAR; INTRAVENOUS EVERY 6 HOURS PRN
Status: DISCONTINUED | OUTPATIENT
Start: 2018-10-11 | End: 2018-10-12 | Stop reason: HOSPADM

## 2018-10-11 RX ORDER — SIMVASTATIN 40 MG
40 TABLET ORAL NIGHTLY
Status: DISCONTINUED | OUTPATIENT
Start: 2018-10-11 | End: 2018-10-12 | Stop reason: HOSPADM

## 2018-10-11 RX ORDER — ONDANSETRON 2 MG/ML
INJECTION INTRAMUSCULAR; INTRAVENOUS PRN
Status: DISCONTINUED | OUTPATIENT
Start: 2018-10-11 | End: 2018-10-11 | Stop reason: SDUPTHER

## 2018-10-11 RX ORDER — CLOPIDOGREL BISULFATE 75 MG/1
75 TABLET ORAL DAILY
Status: DISCONTINUED | OUTPATIENT
Start: 2018-10-12 | End: 2018-10-12 | Stop reason: HOSPADM

## 2018-10-11 RX ORDER — HEPARIN SODIUM 1000 [USP'U]/ML
INJECTION, SOLUTION INTRAVENOUS; SUBCUTANEOUS PRN
Status: DISCONTINUED | OUTPATIENT
Start: 2018-10-11 | End: 2018-10-11 | Stop reason: SDUPTHER

## 2018-10-11 RX ORDER — LABETALOL HYDROCHLORIDE 5 MG/ML
INJECTION, SOLUTION INTRAVENOUS PRN
Status: DISCONTINUED | OUTPATIENT
Start: 2018-10-11 | End: 2018-10-11 | Stop reason: SDUPTHER

## 2018-10-11 RX ORDER — LABETALOL HYDROCHLORIDE 5 MG/ML
10 INJECTION, SOLUTION INTRAVENOUS
Status: DISCONTINUED | OUTPATIENT
Start: 2018-10-11 | End: 2018-10-12 | Stop reason: HOSPADM

## 2018-10-11 RX ORDER — SODIUM CHLORIDE 0.9 % (FLUSH) 0.9 %
10 SYRINGE (ML) INJECTION EVERY 12 HOURS SCHEDULED
Status: DISCONTINUED | OUTPATIENT
Start: 2018-10-11 | End: 2018-10-12 | Stop reason: HOSPADM

## 2018-10-11 RX ORDER — SODIUM CHLORIDE 9 MG/ML
INJECTION, SOLUTION INTRAVENOUS CONTINUOUS
Status: DISCONTINUED | OUTPATIENT
Start: 2018-10-11 | End: 2018-10-12 | Stop reason: HOSPADM

## 2018-10-11 RX ADMIN — ONDANSETRON 4 MG: 2 INJECTION INTRAMUSCULAR; INTRAVENOUS at 11:54

## 2018-10-11 RX ADMIN — SIMVASTATIN 40 MG: 40 TABLET, FILM COATED ORAL at 19:53

## 2018-10-11 RX ADMIN — SODIUM CHLORIDE, POTASSIUM CHLORIDE, SODIUM LACTATE AND CALCIUM CHLORIDE: 600; 310; 30; 20 INJECTION, SOLUTION INTRAVENOUS at 07:59

## 2018-10-11 RX ADMIN — PROTAMINE SULFATE 5 MG: 10 INJECTION, SOLUTION INTRAVENOUS at 11:48

## 2018-10-11 RX ADMIN — HEPARIN SODIUM 4400 UNITS: 1000 INJECTION, SOLUTION INTRAVENOUS; SUBCUTANEOUS at 09:16

## 2018-10-11 RX ADMIN — FENTANYL CITRATE 50 MCG: 50 INJECTION INTRAMUSCULAR; INTRAVENOUS at 08:19

## 2018-10-11 RX ADMIN — LABETALOL HYDROCHLORIDE 10 MG: 5 INJECTION, SOLUTION INTRAVENOUS at 12:13

## 2018-10-11 RX ADMIN — PROPOFOL 170 MG: 10 INJECTION, EMULSION INTRAVENOUS at 08:23

## 2018-10-11 RX ADMIN — FENTANYL CITRATE 25 MCG: 50 INJECTION, SOLUTION INTRAMUSCULAR; INTRAVENOUS at 18:22

## 2018-10-11 RX ADMIN — SODIUM CHLORIDE, POTASSIUM CHLORIDE, SODIUM LACTATE AND CALCIUM CHLORIDE: 600; 310; 30; 20 INJECTION, SOLUTION INTRAVENOUS at 12:06

## 2018-10-11 RX ADMIN — FAMOTIDINE 20 MG: 20 TABLET, FILM COATED ORAL at 19:53

## 2018-10-11 RX ADMIN — NEOSTIGMINE METHYLSULFATE 5 MG: 1 INJECTION, SOLUTION INTRAMUSCULAR; INTRAVENOUS; SUBCUTANEOUS at 11:52

## 2018-10-11 RX ADMIN — OXYCODONE HYDROCHLORIDE AND ACETAMINOPHEN 1 TABLET: 5; 325 TABLET ORAL at 19:53

## 2018-10-11 RX ADMIN — HEPARIN SODIUM 2500 UNITS: 1000 INJECTION, SOLUTION INTRAVENOUS; SUBCUTANEOUS at 09:54

## 2018-10-11 RX ADMIN — DEXAMETHASONE SODIUM PHOSPHATE 6 MG: 10 INJECTION INTRAMUSCULAR; INTRAVENOUS at 14:16

## 2018-10-11 RX ADMIN — Medication 0.8 MG: at 11:52

## 2018-10-11 RX ADMIN — PROTAMINE SULFATE 20 MG: 10 INJECTION, SOLUTION INTRAVENOUS at 11:51

## 2018-10-11 RX ADMIN — ROCURONIUM BROMIDE 10 MG: 10 INJECTION INTRAVENOUS at 11:35

## 2018-10-11 RX ADMIN — ROCURONIUM BROMIDE 20 MG: 10 INJECTION INTRAVENOUS at 11:09

## 2018-10-11 RX ADMIN — ROCURONIUM BROMIDE 30 MG: 10 INJECTION INTRAVENOUS at 09:55

## 2018-10-11 RX ADMIN — Medication 0.2 MG: at 09:01

## 2018-10-11 RX ADMIN — PHENYLEPHRINE HYDROCHLORIDE 50 MCG: 10 INJECTION INTRAVENOUS at 09:45

## 2018-10-11 RX ADMIN — SODIUM CHLORIDE, POTASSIUM CHLORIDE, SODIUM LACTATE AND CALCIUM CHLORIDE: 600; 310; 30; 20 INJECTION, SOLUTION INTRAVENOUS at 12:03

## 2018-10-11 RX ADMIN — PROPOFOL 30 MG: 10 INJECTION, EMULSION INTRAVENOUS at 09:55

## 2018-10-11 RX ADMIN — HEPARIN SODIUM 2000 UNITS: 1000 INJECTION, SOLUTION INTRAVENOUS; SUBCUTANEOUS at 10:25

## 2018-10-11 RX ADMIN — PHENYLEPHRINE HYDROCHLORIDE 100 MCG: 10 INJECTION INTRAVENOUS at 10:05

## 2018-10-11 RX ADMIN — SODIUM CHLORIDE 500 ML: 0.9 INJECTION, SOLUTION INTRAVENOUS at 21:57

## 2018-10-11 RX ADMIN — PHENYLEPHRINE HYDROCHLORIDE 50 MCG: 10 INJECTION INTRAVENOUS at 09:17

## 2018-10-11 RX ADMIN — FENTANYL CITRATE 25 MCG: 50 INJECTION, SOLUTION INTRAMUSCULAR; INTRAVENOUS at 23:51

## 2018-10-11 RX ADMIN — SODIUM CHLORIDE, PRESERVATIVE FREE 10 ML: 5 INJECTION INTRAVENOUS at 20:24

## 2018-10-11 RX ADMIN — ROCURONIUM BROMIDE 50 MG: 10 INJECTION INTRAVENOUS at 08:23

## 2018-10-11 RX ADMIN — SODIUM CHLORIDE, POTASSIUM CHLORIDE, SODIUM LACTATE AND CALCIUM CHLORIDE: 600; 310; 30; 20 INJECTION, SOLUTION INTRAVENOUS at 08:33

## 2018-10-11 RX ADMIN — ACETAMINOPHEN 650 MG: 325 TABLET ORAL at 13:47

## 2018-10-11 RX ADMIN — PROTAMINE SULFATE 10 MG: 10 INJECTION, SOLUTION INTRAVENOUS at 11:49

## 2018-10-11 RX ADMIN — PROTAMINE SULFATE 15 MG: 10 INJECTION, SOLUTION INTRAVENOUS at 11:50

## 2018-10-11 RX ADMIN — FENTANYL CITRATE 50 MCG: 50 INJECTION INTRAMUSCULAR; INTRAVENOUS at 08:16

## 2018-10-11 RX ADMIN — FENTANYL CITRATE 25 MCG: 50 INJECTION, SOLUTION INTRAMUSCULAR; INTRAVENOUS at 15:10

## 2018-10-11 RX ADMIN — SODIUM CHLORIDE, POTASSIUM CHLORIDE, SODIUM LACTATE AND CALCIUM CHLORIDE: 600; 310; 30; 20 INJECTION, SOLUTION INTRAVENOUS at 09:40

## 2018-10-11 RX ADMIN — LIDOCAINE HYDROCHLORIDE 50 MG: 10 INJECTION, SOLUTION EPIDURAL; INFILTRATION; INTRACAUDAL; PERINEURAL at 08:23

## 2018-10-11 RX ADMIN — IODIXANOL 169 ML: 270 INJECTION, SOLUTION INTRAVASCULAR at 11:54

## 2018-10-11 RX ADMIN — FENTANYL CITRATE 50 MCG: 50 INJECTION INTRAMUSCULAR; INTRAVENOUS at 11:54

## 2018-10-11 ASSESSMENT — PULMONARY FUNCTION TESTS
PIF_VALUE: 18
PIF_VALUE: 1
PIF_VALUE: 15
PIF_VALUE: 15
PIF_VALUE: 0
PIF_VALUE: 15
PIF_VALUE: 17
PIF_VALUE: 15
PIF_VALUE: 18
PIF_VALUE: 15
PIF_VALUE: 15
PIF_VALUE: 0
PIF_VALUE: 5
PIF_VALUE: 15
PIF_VALUE: 3
PIF_VALUE: 16
PIF_VALUE: 15
PIF_VALUE: 17
PIF_VALUE: 1
PIF_VALUE: 17
PIF_VALUE: 17
PIF_VALUE: 15
PIF_VALUE: 15
PIF_VALUE: 18
PIF_VALUE: 17
PIF_VALUE: 18
PIF_VALUE: 18
PIF_VALUE: 15
PIF_VALUE: 16
PIF_VALUE: 15
PIF_VALUE: 15
PIF_VALUE: 17
PIF_VALUE: 15
PIF_VALUE: 15
PIF_VALUE: 17
PIF_VALUE: 15
PIF_VALUE: 17
PIF_VALUE: 3
PIF_VALUE: 15
PIF_VALUE: 17
PIF_VALUE: 17
PIF_VALUE: 18
PIF_VALUE: 17
PIF_VALUE: 15
PIF_VALUE: 17
PIF_VALUE: 15
PIF_VALUE: 17
PIF_VALUE: 0
PIF_VALUE: 17
PIF_VALUE: 12
PIF_VALUE: 15
PIF_VALUE: 15
PIF_VALUE: 17
PIF_VALUE: 17
PIF_VALUE: 15
PIF_VALUE: 17
PIF_VALUE: 0
PIF_VALUE: 15
PIF_VALUE: 16
PIF_VALUE: 15
PIF_VALUE: 16
PIF_VALUE: 17
PIF_VALUE: 8
PIF_VALUE: 0
PIF_VALUE: 2
PIF_VALUE: 18
PIF_VALUE: 1
PIF_VALUE: 17
PIF_VALUE: 17
PIF_VALUE: 15
PIF_VALUE: 17
PIF_VALUE: 3
PIF_VALUE: 1
PIF_VALUE: 3
PIF_VALUE: 1
PIF_VALUE: 15
PIF_VALUE: 17
PIF_VALUE: 14
PIF_VALUE: 15
PIF_VALUE: 17
PIF_VALUE: 17
PIF_VALUE: 15
PIF_VALUE: 0
PIF_VALUE: 1
PIF_VALUE: 15
PIF_VALUE: 18
PIF_VALUE: 15
PIF_VALUE: 15
PIF_VALUE: 17
PIF_VALUE: 16
PIF_VALUE: 17
PIF_VALUE: 1
PIF_VALUE: 17
PIF_VALUE: 1
PIF_VALUE: 16
PIF_VALUE: 17
PIF_VALUE: 15
PIF_VALUE: 17
PIF_VALUE: 17
PIF_VALUE: 18
PIF_VALUE: 15
PIF_VALUE: 15
PIF_VALUE: 17
PIF_VALUE: 15
PIF_VALUE: 16
PIF_VALUE: 17
PIF_VALUE: 17
PIF_VALUE: 0
PIF_VALUE: 0
PIF_VALUE: 17
PIF_VALUE: 15
PIF_VALUE: 17
PIF_VALUE: 15
PIF_VALUE: 3
PIF_VALUE: 15
PIF_VALUE: 14
PIF_VALUE: 18
PIF_VALUE: 18
PIF_VALUE: 1
PIF_VALUE: 18
PIF_VALUE: 15
PIF_VALUE: 18
PIF_VALUE: 17
PIF_VALUE: 18
PIF_VALUE: 16
PIF_VALUE: 15
PIF_VALUE: 17
PIF_VALUE: 15
PIF_VALUE: 16
PIF_VALUE: 17
PIF_VALUE: 13
PIF_VALUE: 0
PIF_VALUE: 16
PIF_VALUE: 15
PIF_VALUE: 17
PIF_VALUE: 15
PIF_VALUE: 17
PIF_VALUE: 17
PIF_VALUE: 18
PIF_VALUE: 17
PIF_VALUE: 17
PIF_VALUE: 14
PIF_VALUE: 17
PIF_VALUE: 15
PIF_VALUE: 17
PIF_VALUE: 13
PIF_VALUE: 17
PIF_VALUE: 17
PIF_VALUE: 15
PIF_VALUE: 17
PIF_VALUE: 16
PIF_VALUE: 17
PIF_VALUE: 15
PIF_VALUE: 2
PIF_VALUE: 18
PIF_VALUE: 2
PIF_VALUE: 0
PIF_VALUE: 16
PIF_VALUE: 17
PIF_VALUE: 14
PIF_VALUE: 15
PIF_VALUE: 18
PIF_VALUE: 17
PIF_VALUE: 15
PIF_VALUE: 15
PIF_VALUE: 18
PIF_VALUE: 15
PIF_VALUE: 17
PIF_VALUE: 17
PIF_VALUE: 15
PIF_VALUE: 17
PIF_VALUE: 15
PIF_VALUE: 18
PIF_VALUE: 2
PIF_VALUE: 14
PIF_VALUE: 15
PIF_VALUE: 12
PIF_VALUE: 1
PIF_VALUE: 15
PIF_VALUE: 15
PIF_VALUE: 17
PIF_VALUE: 1
PIF_VALUE: 17
PIF_VALUE: 17
PIF_VALUE: 0
PIF_VALUE: 17
PIF_VALUE: 2
PIF_VALUE: 17
PIF_VALUE: 0
PIF_VALUE: 1
PIF_VALUE: 17
PIF_VALUE: 17
PIF_VALUE: 15
PIF_VALUE: 17
PIF_VALUE: 15
PIF_VALUE: 18
PIF_VALUE: 17
PIF_VALUE: 0
PIF_VALUE: 17
PIF_VALUE: 17
PIF_VALUE: 2
PIF_VALUE: 15
PIF_VALUE: 3
PIF_VALUE: 18
PIF_VALUE: 17
PIF_VALUE: 17
PIF_VALUE: 18
PIF_VALUE: 17

## 2018-10-11 ASSESSMENT — PAIN DESCRIPTION - LOCATION
LOCATION: HEAD

## 2018-10-11 ASSESSMENT — PAIN SCALES - GENERAL
PAINLEVEL_OUTOF10: 0
PAINLEVEL_OUTOF10: 1
PAINLEVEL_OUTOF10: 3
PAINLEVEL_OUTOF10: 2
PAINLEVEL_OUTOF10: 5
PAINLEVEL_OUTOF10: 4
PAINLEVEL_OUTOF10: 5

## 2018-10-11 ASSESSMENT — LIFESTYLE VARIABLES: SMOKING_STATUS: 1

## 2018-10-11 ASSESSMENT — PAIN DESCRIPTION - PROGRESSION: CLINICAL_PROGRESSION: GRADUALLY IMPROVING

## 2018-10-11 ASSESSMENT — PAIN DESCRIPTION - DESCRIPTORS
DESCRIPTORS: HEADACHE
DESCRIPTORS: ACHING
DESCRIPTORS: ACHING

## 2018-10-11 ASSESSMENT — PAIN - FUNCTIONAL ASSESSMENT: PAIN_FUNCTIONAL_ASSESSMENT: 0-10

## 2018-10-11 ASSESSMENT — ENCOUNTER SYMPTOMS: SHORTNESS OF BREATH: 0

## 2018-10-11 ASSESSMENT — PAIN DESCRIPTION - FREQUENCY: FREQUENCY: CONTINUOUS

## 2018-10-11 ASSESSMENT — PAIN DESCRIPTION - ONSET: ONSET: ON-GOING

## 2018-10-11 ASSESSMENT — PAIN DESCRIPTION - PAIN TYPE: TYPE: ACUTE PAIN

## 2018-10-11 NOTE — H&P
History and Physical    Pt Name: Darien Lopez  MRN: [id number]  YOB: 1975  Date of evaluation: 10/11/2018  Primary Care Physician: Zoran Judge MD  Patient evaluated at the request of  Dr. Beth Fleming    Reason for evaluation:  Hx of a brain aneurysms  SUBJECTIVE:   History of Chief Complaint:      Betty Mancini is a 44 y.o. female , Betty Mancini is a 44 y.o. female , A 45 yo woman with [address] who underwent coil embolization on 1- of a ruptured right mca bifurcation aneurysm. Her hospital course was marked by hydrocephalus s/p high volume LP x 1.  She was discharged on 1/26/2018.         has  Occasional headaches  headaches , hx of some short term memory , has smoked x 20 yrs and she  was told has a new aneurysm of posterior communicating artery .                        Past Medical History      has a past medical history of Caffeinism ([facility] [address] Generalized headaches; Low back pain; Migraine; Patient in clinical research study; Patient in clinical research study; Recurrent sinus infections; Ruptured middle cerebral artery aneurysm (HCC); SAH (subarachnoid hemorrhage) ([facility] [address] and Tobacco use. Past Surgical History   has a past surgical history that includes other surgical history (04/02/2013); Tubal ligation (05/24/2011); Brain aneurysm surgery (01/12/2018); and other surgical history (05/31/2018).        Medications   Scheduled Meds:  Current Outpatient Rx   Medication Sig Dispense Refill    Multiple Vitamins-Minerals (THERAPEUTIC MULTIVITAMIN-MINERALS) tablet Take 1 tablet by mouth daily      Probiotic Product (PROBIOTIC DAILY PO) Take 1 tablet by mouth daily      LORATADINE ALLERGY RELIEF PO Take 1 tablet by mouth 10 mg.      aspirin 81 MG EC tablet Take 1 tablet by mouth daily 30 tablet 3    clopidogrel (PLAVIX) 75 MG tablet Take 1 tablet by mouth daily 30 tablet 3    levothyroxine (SYNTHROID) 25 MCG tablet Take 1 tablet by mouth Daily 30 tablet 11    simvastatin (ZOCOR)
lactated ringers 100 mL/hr at 10/11/18 0759     PRN Meds:.  Past Medical History   has a past medical history of Caffeinism ([facility] [address] Generalized headaches; Low back pain; Migraine; Patient in clinical research study; Patient in clinical research study; Recurrent sinus infections; Ruptured middle cerebral artery aneurysm (HCC); SAH (subarachnoid hemorrhage) ([facility] [address] and Tobacco use. Past Surgical History   has a past surgical history that includes other surgical history (04/02/2013); Tubal ligation (05/24/2011); Brain aneurysm surgery (01/12/2018); and other surgical history (05/31/2018). Social History   reports that she has been smoking. She started smoking about 27 years ago. She has been smoking about 1.00 pack per day. She has never used smokeless tobacco.   reports that she does not drink alcohol. reports that she does not use drugs. Family History  family history includes Alcohol Abuse in her father; Breast Cancer in her paternal grandmother; Cancer in her maternal grandmother and sister; Raul Carry in her maternal grandfather; Other in her father, mother, and another family member. Review of Systems:  CONSTITUTIONAL:  negative for fevers    EYES:  negative for double vision     HEENT:  negative for tinnitus   RESPIRATORY:  negative for cough, shortness of breath    CARDIOVASCULAR:  negative for chest pain, palpitations   GASTROINTESTINAL:  negative for nausea   GENITOURINARY:  negative for hematuria   MUSCULOSKELETAL:  negative for neck pain    NEUROLOGICAL:  See HPI   PSYCHIATRIC:  negative for anxiety      Review of systems otherwise negative. OBJECTIVE:   Vitals: /74   Pulse 74   Temp 98.4 °F (36.9 °C) (Temporal)   Resp 16   Ht 5' 2\" (1.575 m)   Wt 143 lb (64.9 kg)   LMP 10/02/2018   SpO2 98%   BMI 26.16 kg/m²     On exam today: Patient is AAO x3, following commands. CN II-XII grossly intact, pupils 2 mm reactive to light bilaterally. Normal tone in four extremities.    Normal

## 2018-10-11 NOTE — OP NOTE
[facility]    NEUROENDOVASCULAR SERVICE: POST-OP NOTE: 10/11/2018    Pt Name: Sebastián Oneal  MRN: [id number]  [place]: 1975  Date of Procedure: 10/11/2018  Primary Care Physician: Stephanie Gudino MD    Pre-Procedural Diagnosis:right PCOM aneurysm  Post-Procedural Diagnosis:same    Procedure Performed:Conventional cerebral angiogram with Y-Camino Stent assisted embolization. Surgeon:   Renee Nation MD    1st Assistant:  Susannah Romo    Fellow:  Beti Watters    PRE-PROCEDURAL EXAM:  MODIFIED AMY SCORE: 1 - No significant disability: despite symptoms, able to carry out all usual duties and activities. Neurological exam performed and unchanged from initial H&P or consult    Anesthesia: General Anesthesia  Complications: none    Intra-Operative EXAM:  Patient sedated with unchanged limited neurological exam    EBL: < Minimal      Cc            Specimens: Were not Obtained  Contrast:     Visipaque 270 low osmolar 169 Cc             Fluoro: 55.3 min    Findings:  Please see dictated Radiology note for further details  1. No evidence of any aneurysmal sac opacification of the previously stent assisted coil embolized right MCA bifurcation aneurysm. Giovani-Ej occlusion score 1. The stents have good wall apposition with no in-stent stenosis. 2. Right PCOM artery origin aneurysm measuring approximately 1.89 mm x 1.24 mm with a 1.85 mm neck, projecting inferiorly and posteriorly.    3. Successful treatment of the above mentioned wide necked aneurysm with two 4.5 mm x 21 mm  Camino stents (Y shape). Giovani-Ej occlusion score 3.  4. All stents used are MRI compatible up to 3 Caprice. POST-PROCEDURAL EXAM :   Stable neurological Exam  Neurological exam performed and unchanged from initial H&P or consult    Closure:  right [place] 6   F    POST-PROCEDURAL MONITORING : see orders  Disposition: Neuro ICU      Recommendations:  1. Admit to NSICU.   2. Do not bend

## 2018-10-11 NOTE — CONSULTS
incontinence   MUSCULOSKELETAL: negative for neck or back pain   NEUROLOGICAL: negative for seizures   PSYCHIATRIC: negative for fatigue     Review of systems otherwise negative. PHYSICAL EXAM:       /66   Pulse 68   Temp 96.8 °F (36 °C) (Temporal)   Resp 15   Ht 5' 2\" (1.575 m)   Wt 143 lb (64.9 kg)   LMP 10/02/2018   SpO2 96%   BMI 26.16 kg/m²       CONSTITUTIONAL:  Well developed, well nourished, alert and oriented x 3, in no acute distress. GCS 15. Nontoxic. No dysarthria. No aphasia. HEAD:  normocephalic, atraumatic    EYES:  PERRLA, EOMI.   ENT:  moist mucous membranes   NECK:  supple, symmetric, no midline tenderness to palpation    BACK:  without midline tenderness, step-offs or deformities    LUNGS:  Equal air entry bilaterally   CARDIOVASCULAR:  normal s1 / s2   ABDOMEN:  Soft, no rigidity   NEUROLOGIC:  Mental Status:  A & O x3,awake             Cranial Nerves:    cranial nerves II-XII are grossly intact    Motor Exam:    Drift:  absent  Tone:  normal    Motor exam is symmetrical 5 out of 5 all extremities bilaterally    Sensory:    Touch:    Right Upper Extremity:  normal  Left Upper Extremity:  normal  Right Lower Extremity:  normal  Left Lower Extremity:  normal    Deep Tendon Reflexes:    Right Bicep:  2+  Left Bicep:  2+  Right Knee:  2+  Left Knee:  2+    Plantar Response:  Right:  downgoing  Left:  downgoing    Clonus:  absent  Pyle's:  absent    Coordination/Dysmetria:  Heel to Shin:  Right:  normal  Left:  normal  Finger to Nose:   Right:  normal  Left:  normal     Gait:  Not done, post aneurysm repair   SKIN:  no rash      INITIAL NIH STROKE SCALE    Time Performed:  3:00 PM    Administer stroke scale items in the order listed. Record performance in each category after each subscale exam. Do not go back and change scores. Follow directions provided for each exam technique. Scores should reflect what the patient does, not what the clinician thinks the patient can do.  The Neuroendovascular):  1. Admit to NICU. 2. Do not bend right leg for 3 hours. 3. Groin checks per protocol. 4. Neuro checks per icu protocol. 5. Peripheral pulse checks per protocol. 6. ICU management per NICU team.   7. SBP goal   8. Resume home dose aspirin and plavix in am.    PROPHYLAXIS:  Stress ulcer: H2 blocker    DVT PROPHYLAXIS:  - SCD sleeves - Thigh High   - AIDAN stockings - Thigh High  - No chemoprophylaxis anticoagulation at this time. - Aspirin 81mg PO D and Plavix 75mg PO D start tomorrow in AM    DISPOSITION:  [x] To remain ICU: Post Right PCOM Aneurysm repair, watch groin area for any hematomas, watch leg for ischemic limb  [] OK for out of ICU from Neuro Critical Care standpoint    We will continue to follow along. For any changes in exam or patient status please contact Neuro Critical Care.

## 2018-10-11 NOTE — ANESTHESIA POSTPROCEDURE EVALUATION
Department of Anesthesiology  Postprocedure Note    Patient: Dulce العراقي  MRN: [id number]  [place]: 1975  Date of evaluation: 10/11/2018  Time:  2:59 PM     Procedure Summary     Date:  10/11/18 Room / Location:  [facility] Special Procedures    Anesthesia Start:  2921 Anesthesia Stop:  0532    Procedure:  IR ANGIOGRAM CAROTID CEREBRAL BILATERAL Diagnosis:       Aneurysm ([address]      (Embo/coiling)    Scheduled Providers:  LEATHA Mckinley - CRNA Responsible Provider:  Shaista Martinez MD    Anesthesia Type:  general ASA Status:  3          Anesthesia Type: general    Florencia Phase I: Florencia Score: 8    Florencia Phase II:      Last vitals: Reviewed and per EMR flowsheets.        Anesthesia Post Evaluation    Patient location during evaluation: PACU  Patient participation: complete - patient participated  Level of consciousness: awake and alert  Pain score: 2  Nausea & Vomiting: no nausea and no vomiting  Complications: no  Cardiovascular status: blood pressure returned to baseline  Respiratory status: acceptable  Hydration status: euvolemic

## 2018-10-11 NOTE — FLOWSHEET NOTE
10 cc of air removed from safeguard. Right groin is clean, dry and intact with no redness, swelling or hematoma. Pulses are all palpable. Will continue to monitor.

## 2018-10-11 NOTE — FLOWSHEET NOTE
Pt arrived to room 522 from recovery  Pt transferred from stretcher to bed with appropriate measures. Vitals are stable. Pt is hooked up to telemetry. Bed is locked and in the lowest position with the bed alarm on. Call light education given and within reach. All questions answered at this time. Will continue to monitor.

## 2018-10-11 NOTE — ANESTHESIA PRE PROCEDURE
Department of Anesthesiology  Preprocedure Note       Name:  Jareth Britton   Age:  44 y.o.  :  1975                                          MRN:  [id number]         Date:  10/11/2018      Surgeon: * No surgeons listed *    Procedure: * No procedures listed *    Medications prior to admission:   Prior to Admission medications    Medication Sig Start Date End Date Taking? Authorizing Provider   Multiple Vitamins-Minerals (THERAPEUTIC MULTIVITAMIN-MINERALS) tablet Take 1 tablet by mouth daily    Historical Provider, MD   Probiotic Product (PROBIOTIC DAILY PO) Take 1 tablet by mouth daily    Historical Provider, MD   LORATADINE ALLERGY RELIEF PO Take 1 tablet by mouth 10 mg.     Historical Provider, MD   aspirin 81 MG EC tablet Take 1 tablet by mouth daily 6/3/18   Nieves Hunter MD   clopidogrel (PLAVIX) 75 MG tablet Take 1 tablet by mouth daily 6/3/18   Nieves Hunter MD   levothyroxine (SYNTHROID) 25 MCG tablet Take 1 tablet by mouth Daily 18   Jarod Jaquez MD   simvastatin (ZOCOR) 40 MG tablet Take 1 tablet by mouth nightly 18   Jarod Jaquez MD   acetaminophen (TYLENOL) 325 MG tablet Take 2 tablets by mouth every 6 hours as needed for Pain 18   Zeinab Nuñez MD   albuterol sulfate HFA (VENTOLIN HFA) 108 (90 BASE) MCG/ACT inhaler Inhale 2 puffs into the lungs every 4 hours as needed for Wheezing or Shortness of Breath 17   Koki Saravia MD       Current medications:    Current Outpatient Prescriptions   Medication Sig Dispense Refill    Multiple Vitamins-Minerals (THERAPEUTIC MULTIVITAMIN-MINERALS) tablet Take 1 tablet by mouth daily      Probiotic Product (PROBIOTIC DAILY PO) Take 1 tablet by mouth daily      LORATADINE ALLERGY RELIEF PO Take 1 tablet by mouth 10 mg.      aspirin 81 MG EC tablet Take 1 tablet by mouth daily 30 tablet 3    clopidogrel (PLAVIX) 75 MG tablet Take 1 tablet by mouth daily 30 tablet 3    levothyroxine (SYNTHROID) 25 MCG tablet Take 1 tablet by mouth Daily 30 tablet 11    simvastatin (ZOCOR) 40 MG tablet Take 1 tablet by mouth nightly 30 tablet 11    acetaminophen (TYLENOL) 325 MG tablet Take 2 tablets by mouth every 6 hours as needed for Pain 120 tablet 3    albuterol sulfate HFA (VENTOLIN HFA) 108 (90 BASE) MCG/ACT inhaler Inhale 2 puffs into the lungs every 4 hours as needed for Wheezing or Shortness of Breath 1 Inhaler 5     No current facility-administered medications for this visit. Facility-Administered Medications Ordered in Other Visits   Medication Dose Route Frequency Provider Last Rate Last Dose    lactated ringers infusion   Intravenous Continuous Sandeep Gallegos MD           Allergies:     Allergies   Allergen Reactions    Prozac [Fluoxetine Hcl]      cutting    Chantix [Varenicline] Nausea And Vomiting     vomiting       Problem List:    Patient Active Problem List   Diagnosis Code    Tobacco use Z72.0    Genital warts A63.0    Migraine G43.909    [facility] ([facility]) I60.9    Aneurysmal subarachnoid hemorrhage ([address] I60.8    Generalized headache R51    Ruptured cerebral aneurysm ([address] I60.9    Cerebral aneurysm I67.1    Hyponatremia E87.1    Hydrocephalus G91.9    Hypothyroidism E03.9    Dyslipidemia E78.5    Middle cerebral artery aneurysm I67.1    Aneurysm ([address] I72.9    MRI-safe endovascular aneurysm coil present Z95.828       Past Medical History:        Diagnosis Date    Caffeinism ([address] 1989    [employer]    Generalized headaches 1991    Low back pain 2013    Migraine 1991    Migraines stopped after [facility] first [facility] Patient in clinical research study 01/12/2018    TARGET: Consented 01/12/2018, Completed 04/20/2018    Patient in clinical research study 05/31/2018    SMART: Consented 05/31/2018, Expected Completion Date 05/31/2019    Recurrent sinus infections 05/24/2017    Ruptured middle cerebral artery aneurysm ([address] 01/12/2018    Right [facility] (subarachnoid hemorrhage) ([facility] [address] 01/12/2018    Tobacco use 1991       Past Surgical History:        Procedure Laterality Date    BRAIN ANEURYSM SURGERY  01/12/2018    4 coils target martine,ultra 3t ok. coil ablation ; MCA     OTHER SURGICAL HISTORY  04/02/2013    Pelvic Ablation    OTHER SURGICAL HISTORY  05/31/2018    3 coilings penumbra 3t ok/ MCA    TUBAL LIGATION  05/24/2011       Social History:    Social History   Substance Use Topics    Smoking status: Current Every Day Smoker     Packs/day: 1.00     Start date: [address]     Last attempt to quit: 1/12/2018    Smokeless tobacco: Never Used    Alcohol use No                                Ready to quit: Not Answered  Counseling given: Not Answered      Vital Signs (Current): There were no vitals filed for this visit. BP Readings from Last 3 Encounters:   09/18/18 125/79   08/13/18 104/76   07/12/18 136/80       NPO Status:                                                                                 BMI:   Wt Readings from Last 3 Encounters:   10/11/18 143 lb (64.9 kg)   09/18/18 141 lb (64 kg)   08/13/18 141 lb (64 kg)     There is no height or weight on file to calculate BMI.    CBC:   Lab Results   Component Value Date    WBC 11.7 06/02/2018    RBC 3.26 06/02/2018    HGB 10.4 06/02/2018    HCT 31.4 06/02/2018    MCV 96.3 06/02/2018    RDW 13.0 06/02/2018     06/02/2018       CMP:   Lab Results   Component Value Date     06/02/2018    K 3.6 06/02/2018     06/02/2018    CO2 19 06/02/2018    BUN 7 06/02/2018    CREATININE 0.53 06/02/2018    GFRAA >60 06/02/2018    LABGLOM >60 06/02/2018    GLUCOSE 89 06/02/2018    GLUCOSE 88 03/24/2018    CALCIUM 7.7 06/02/2018       POC Tests: No results for input(s): POCGLU, POCNA, POCK, POCCL, POCBUN, POCHEMO, POCHCT in the last 72 hours.     Coags:   Lab Results   Component Value Date    PROTIME 9.7 05/31/2018    INR 0.9 05/31/2018    APTT 23.4 05/31/2018 HCG (If Applicable):   Lab Results   Component Value Date    HCG NEGATIVE 05/31/2018        ABGs: No results found for: PHART, PO2ART, IXN9XOZ, QZD9KQQ, BEART, J7AXGIFO     Type & Screen (If Applicable):  No results found for: LABABO, [address]    Anesthesia Evaluation  Patient summary reviewed and Nursing notes reviewed no history of anesthetic complications:   Airway: Mallampati: I  TM distance: >3 FB   Neck ROM: full  Mouth opening: > = 3 FB Dental: normal exam   (+) caps  Comment: Permanent bridge    Pulmonary: breath sounds clear to auscultation  (+) asthma: current smoker    (-) pneumonia, COPD, shortness of breath, recent URI and sleep apnea                           Cardiovascular:  Exercise tolerance: good (>4 METS),       (-) pacemaker, hypertension, valvular problems/murmurs, past MI, CAD, CABG/stent, dysrhythmias,  angina,  CHF, orthopnea, PND and  REYES      Rhythm: regular  Rate: normal           Beta Blocker:  Not on Beta Blocker         Neuro/Psych:   (+) CVA:, headaches:,    (-) seizures, neuromuscular disease, TIA and psychiatric history           GI/Hepatic/Renal:        (-) hiatal hernia, GERD, PUD, hepatitis, liver disease, no renal disease and bowel prep       Endo/Other:    (+) hypothyroidism::., .    (-) hyperthyroidism, blood dyscrasia, arthritis               Abdominal:         (-) obese     Vascular:                                        Anesthesia Plan      general     ASA 3       Induction: intravenous. arterial line  MIPS: Postoperative opioids intended. Anesthetic plan and risks discussed with patient. Plan discussed with CRNA.                 Zander Sandoval MD   10/11/2018

## 2018-10-12 VITALS
BODY MASS INDEX: 26.31 KG/M2 | RESPIRATION RATE: 21 BRPM | HEART RATE: 67 BPM | HEIGHT: 62 IN | DIASTOLIC BLOOD PRESSURE: 60 MMHG | WEIGHT: 143 LBS | TEMPERATURE: 98.4 F | OXYGEN SATURATION: 96 % | SYSTOLIC BLOOD PRESSURE: 99 MMHG

## 2018-10-12 LAB
ABSOLUTE EOS #: 0.04 K/UL (ref 0–0.44)
ABSOLUTE IMMATURE GRANULOCYTE: 0.07 K/UL (ref 0–0.3)
ABSOLUTE LYMPH #: 2.3 K/UL (ref 1.1–3.7)
ABSOLUTE MONO #: 0.64 K/UL (ref 0.1–1.2)
ANION GAP SERPL CALCULATED.3IONS-SCNC: 10 MMOL/L (ref 9–17)
BASOPHILS # BLD: 0 % (ref 0–2)
BASOPHILS ABSOLUTE: <0.03 K/UL (ref 0–0.2)
BUN BLDV-MCNC: 7 MG/DL (ref 6–20)
BUN/CREAT BLD: ABNORMAL (ref 9–20)
CALCIUM SERPL-MCNC: 8.4 MG/DL (ref 8.6–10.4)
CHLORIDE BLD-SCNC: 110 MMOL/L (ref 98–107)
CO2: 19 MMOL/L (ref 20–31)
CREAT SERPL-MCNC: 0.57 MG/DL (ref 0.5–0.9)
DIFFERENTIAL TYPE: ABNORMAL
EOSINOPHILS RELATIVE PERCENT: 0 % (ref 1–4)
GFR AFRICAN AMERICAN: >60 ML/MIN
GFR NON-AFRICAN AMERICAN: >60 ML/MIN
GFR SERPL CREATININE-BSD FRML MDRD: ABNORMAL ML/MIN/{1.73_M2}
GFR SERPL CREATININE-BSD FRML MDRD: ABNORMAL ML/MIN/{1.73_M2}
GLUCOSE BLD-MCNC: 129 MG/DL (ref 70–99)
HCT VFR BLD CALC: 31.5 % (ref 36.3–47.1)
HEMOGLOBIN: 10.4 G/DL (ref 11.9–15.1)
IMMATURE GRANULOCYTES: 1 %
LYMPHOCYTES # BLD: 17 % (ref 24–43)
MAGNESIUM: 1.8 MG/DL (ref 1.6–2.6)
MCH RBC QN AUTO: 32.2 PG (ref 25.2–33.5)
MCHC RBC AUTO-ENTMCNC: 33 G/DL (ref 28.4–34.8)
MCV RBC AUTO: 97.5 FL (ref 82.6–102.9)
MONOCYTES # BLD: 5 % (ref 3–12)
MRSA, DNA, NASAL: NORMAL
NRBC AUTOMATED: 0 PER 100 WBC
PDW BLD-RTO: 12.8 % (ref 11.8–14.4)
PHOSPHORUS: 2.8 MG/DL (ref 2.6–4.5)
PLATELET # BLD: 208 K/UL (ref 138–453)
PLATELET ESTIMATE: ABNORMAL
PMV BLD AUTO: 8.9 FL (ref 8.1–13.5)
POTASSIUM SERPL-SCNC: 4 MMOL/L (ref 3.7–5.3)
RBC # BLD: 3.23 M/UL (ref 3.95–5.11)
RBC # BLD: ABNORMAL 10*6/UL
SEG NEUTROPHILS: 77 % (ref 36–65)
SEGMENTED NEUTROPHILS ABSOLUTE COUNT: 10.75 K/UL (ref 1.5–8.1)
SODIUM BLD-SCNC: 139 MMOL/L (ref 135–144)
SPECIMEN DESCRIPTION: NORMAL
WBC # BLD: 13.8 K/UL (ref 3.5–11.3)
WBC # BLD: ABNORMAL 10*3/UL

## 2018-10-12 PROCEDURE — 84100 ASSAY OF PHOSPHORUS: CPT

## 2018-10-12 PROCEDURE — 80048 BASIC METABOLIC PNL TOTAL CA: CPT

## 2018-10-12 PROCEDURE — 6370000000 HC RX 637 (ALT 250 FOR IP): Performed by: PSYCHIATRY & NEUROLOGY

## 2018-10-12 PROCEDURE — 99233 SBSQ HOSP IP/OBS HIGH 50: CPT | Performed by: PSYCHIATRY & NEUROLOGY

## 2018-10-12 PROCEDURE — 99238 HOSP IP/OBS DSCHRG MGMT 30/<: CPT | Performed by: PSYCHIATRY & NEUROLOGY

## 2018-10-12 PROCEDURE — 85025 COMPLETE CBC W/AUTO DIFF WBC: CPT

## 2018-10-12 PROCEDURE — 6370000000 HC RX 637 (ALT 250 FOR IP): Performed by: NURSE PRACTITIONER

## 2018-10-12 PROCEDURE — 36415 COLL VENOUS BLD VENIPUNCTURE: CPT

## 2018-10-12 PROCEDURE — 83735 ASSAY OF MAGNESIUM: CPT

## 2018-10-12 PROCEDURE — 6370000000 HC RX 637 (ALT 250 FOR IP): Performed by: INTERNAL MEDICINE

## 2018-10-12 RX ORDER — CLOPIDOGREL BISULFATE 75 MG/1
75 TABLET ORAL DAILY
Qty: 30 TABLET | Refills: 5 | Status: SHIPPED | OUTPATIENT
Start: 2018-10-13 | End: 2019-02-25 | Stop reason: SDUPTHER

## 2018-10-12 RX ORDER — TOPIRAMATE 25 MG/1
25 TABLET ORAL NIGHTLY
Qty: 60 TABLET | Refills: 3 | Status: SHIPPED | OUTPATIENT
Start: 2018-10-12 | End: 2019-04-17 | Stop reason: SDUPTHER

## 2018-10-12 RX ADMIN — CLOPIDOGREL 75 MG: 75 TABLET, FILM COATED ORAL at 08:59

## 2018-10-12 RX ADMIN — ASPIRIN 81 MG: 81 TABLET ORAL at 08:59

## 2018-10-12 RX ADMIN — MAGNESIUM GLUCONATE 500 MG ORAL TABLET 400 MG: 500 TABLET ORAL at 11:27

## 2018-10-12 RX ADMIN — Medication 100 MG: at 11:27

## 2018-10-12 RX ADMIN — ACETAMINOPHEN 650 MG: 325 TABLET ORAL at 05:33

## 2018-10-12 RX ADMIN — LEVOTHYROXINE SODIUM 25 MCG: 25 TABLET ORAL at 08:59

## 2018-10-12 RX ADMIN — FAMOTIDINE 20 MG: 20 TABLET, FILM COATED ORAL at 08:59

## 2018-10-12 ASSESSMENT — PAIN SCALES - GENERAL
PAINLEVEL_OUTOF10: 1
PAINLEVEL_OUTOF10: 1

## 2018-10-12 NOTE — FLOWSHEET NOTE
Discharge instructions went over with patient. Prescriptions and follow up appointments all verbalized. All questions answered at this time.

## 2018-10-12 NOTE — PROGRESS NOTES
Alba Otto, RCPPatient Assessment complete. Aneurysm ([address] [I72.9]  Aneurysm of posterior communicating artery [I67.1] . Vitals:    10/12/18 0105   BP: 88/61   Pulse: 54   Resp: 18   Temp:    SpO2: 95%   . Patients home meds are   Prior to Admission medications    Medication Sig Start Date End Date Taking? Authorizing Provider   Multiple Vitamins-Minerals (THERAPEUTIC MULTIVITAMIN-MINERALS) tablet Take 1 tablet by mouth daily   Yes Historical Provider, MD   Probiotic Product (PROBIOTIC DAILY PO) Take 1 tablet by mouth daily   Yes Historical Provider, MD   LORATADINE ALLERGY RELIEF PO Take 1 tablet by mouth 10 mg. Yes Historical Provider, MD   aspirin 81 MG EC tablet Take 1 tablet by mouth daily 6/3/18  Yes Shawn Montgomery MD   clopidogrel (PLAVIX) 75 MG tablet Take 1 tablet by mouth daily 6/3/18  Yes Shawn Montgomery MD   levothyroxine (SYNTHROID) 25 MCG tablet Take 1 tablet by mouth Daily 4/6/18  Yes Nery Lynn MD   simvastatin (ZOCOR) 40 MG tablet Take 1 tablet by mouth nightly 4/6/18  Yes Nery Lynn MD   acetaminophen (TYLENOL) 325 MG tablet Take 2 tablets by mouth every 6 hours as needed for Pain 1/26/18   Fredi Chow MD   albuterol sulfate HFA (VENTOLIN HFA) 108 (90 BASE) MCG/ACT inhaler Inhale 2 puffs into the lungs every 4 hours as needed for Wheezing or Shortness of Breath 5/24/17   Julissa Cortes MD   .    Assessment   Pt is resting and in no distress at this time.  Pt take albuterol PRn at home      RR 16  Breath Sounds: CL      · Bronchodilator assessment at level  1  · Hyperinflation assessment at level   · Secretion Management assessment at level    ·   · [x]    Bronchodilator Assessment  BRONCHODILATOR ASSESSMENT SCORE  Score 0 1 2 3 4 5   Breath Sounds   []  Patient Baseline [x]  No Wheeze good aeration []  Faint, scattered wheezing, good aeration []  Expiratory Wheezing and or moderately diminished []  Insp/Exp wheeze and/or very diminished []  Insp/Exp and/ or marked

## 2018-10-12 NOTE — DISCHARGE SUMMARY
mouth Daily  Qty: 30 tablet, Refills: 11    Associated Diagnoses: Hypothyroidism, unspecified type      simvastatin (ZOCOR) 40 MG tablet Take 1 tablet by mouth nightly  Qty: 30 tablet, Refills: 11    Associated Diagnoses: Dyslipidemia      acetaminophen (TYLENOL) 325 MG tablet Take 2 tablets by mouth every 6 hours as needed for Pain  Qty: 120 tablet, Refills: 3      albuterol sulfate HFA (VENTOLIN HFA) 108 (90 BASE) MCG/ACT inhaler Inhale 2 puffs into the lungs every 4 hours as needed for Wheezing or Shortness of Breath  Qty: 1 Inhaler, Refills: 5      Magnesium Oxide 400mg PO D  Vitamin B-2 100mg PO D     STOP taking these medications       escitalopram (LEXAPRO) 10 MG tablet Comments:   Reason for Stopping:               Activity: activity as tolerated  Diet: low fat, low cholesterol diet  Wound Care: as directed    Follow-up with Vascular neurology in 2 weeks (Dr. Danette Treadwell) and in 3 months (Dr. Richard Hagen)  Need MRA W Contrast 1 week before Dr. Sweta Chacon appointment    Signed:  Rogelio Mcmillan MD  Neurology Resident PGY-2  10/12/2018 at 1:07 PM

## 2018-11-05 RX ORDER — CLOPIDOGREL BISULFATE 75 MG/1
TABLET ORAL
Qty: 30 TABLET | Refills: 3 | Status: SHIPPED | OUTPATIENT
Start: 2018-11-05 | End: 2019-08-08

## 2019-01-11 ENCOUNTER — HOSPITAL ENCOUNTER (OUTPATIENT)
Dept: MRI IMAGING | Age: 44
Discharge: HOME | End: 2019-01-13
Payer: COMMERCIAL

## 2019-01-11 DIAGNOSIS — I67.1 ANEURYSM OF POSTERIOR COMMUNICATING ARTERY: ICD-10-CM

## 2019-01-11 DIAGNOSIS — I72.9 ANEURYSM (HCC): ICD-10-CM

## 2019-01-11 PROCEDURE — 70546 MR ANGIOGRAPH HEAD W/O&W/DYE: CPT

## 2019-01-11 PROCEDURE — A9576 INJ PROHANCE MULTIPACK: HCPCS | Performed by: INTERNAL MEDICINE

## 2019-01-11 PROCEDURE — 6360000004 HC RX CONTRAST MEDICATION: Performed by: INTERNAL MEDICINE

## 2019-01-11 RX ADMIN — GADOTERIDOL 12 ML: 279.3 INJECTION, SOLUTION INTRAVENOUS at 16:08

## 2019-01-22 ENCOUNTER — OFFICE VISIT (OUTPATIENT)
Dept: NEUROLOGY | Age: 44
End: 2019-01-22
Payer: COMMERCIAL

## 2019-01-22 VITALS
SYSTOLIC BLOOD PRESSURE: 135 MMHG | HEIGHT: 62 IN | RESPIRATION RATE: 16 BRPM | WEIGHT: 148 LBS | BODY MASS INDEX: 27.23 KG/M2 | HEART RATE: 85 BPM | DIASTOLIC BLOOD PRESSURE: 82 MMHG

## 2019-01-22 DIAGNOSIS — R51.9 NONINTRACTABLE HEADACHE, UNSPECIFIED CHRONICITY PATTERN, UNSPECIFIED HEADACHE TYPE: ICD-10-CM

## 2019-01-22 DIAGNOSIS — I67.1 BRAIN ANEURYSM: ICD-10-CM

## 2019-01-22 DIAGNOSIS — Z71.6 ENCOUNTER FOR SMOKING CESSATION COUNSELING: ICD-10-CM

## 2019-01-22 DIAGNOSIS — Z98.890 POST-OPERATIVE STATE: ICD-10-CM

## 2019-01-22 DIAGNOSIS — Z09 HOSPITAL DISCHARGE FOLLOW-UP: Primary | ICD-10-CM

## 2019-01-22 PROCEDURE — 99214 OFFICE O/P EST MOD 30 MIN: CPT | Performed by: NEUROLOGICAL SURGERY

## 2019-02-25 ENCOUNTER — OFFICE VISIT (OUTPATIENT)
Dept: NEUROLOGY | Age: 44
End: 2019-02-25
Payer: COMMERCIAL

## 2019-02-25 VITALS
HEIGHT: 62 IN | HEART RATE: 89 BPM | BODY MASS INDEX: 27.09 KG/M2 | WEIGHT: 147.2 LBS | SYSTOLIC BLOOD PRESSURE: 134 MMHG | DIASTOLIC BLOOD PRESSURE: 87 MMHG

## 2019-02-25 DIAGNOSIS — I60.8 ANEURYSMAL SUBARACHNOID HEMORRHAGE (HCC): Primary | ICD-10-CM

## 2019-02-25 DIAGNOSIS — I60.7 RUPTURED CEREBRAL ANEURYSM (HCC): ICD-10-CM

## 2019-02-25 DIAGNOSIS — I67.1 CEREBRAL ANEURYSM: ICD-10-CM

## 2019-02-25 PROCEDURE — 99215 OFFICE O/P EST HI 40 MIN: CPT | Performed by: PSYCHIATRY & NEUROLOGY

## 2019-02-28 ENCOUNTER — OFFICE VISIT (OUTPATIENT)
Dept: FAMILY MEDICINE CLINIC | Age: 44
End: 2019-02-28
Payer: COMMERCIAL

## 2019-02-28 VITALS
WEIGHT: 147 LBS | SYSTOLIC BLOOD PRESSURE: 130 MMHG | BODY MASS INDEX: 26.88 KG/M2 | OXYGEN SATURATION: 98 % | HEART RATE: 91 BPM | DIASTOLIC BLOOD PRESSURE: 80 MMHG

## 2019-02-28 DIAGNOSIS — Z72.0 TOBACCO USE: ICD-10-CM

## 2019-02-28 DIAGNOSIS — M54.2 NECK PAIN: Primary | ICD-10-CM

## 2019-02-28 DIAGNOSIS — E78.5 DYSLIPIDEMIA: ICD-10-CM

## 2019-02-28 DIAGNOSIS — E03.9 HYPOTHYROIDISM, UNSPECIFIED TYPE: ICD-10-CM

## 2019-02-28 LAB — TSH REFLEX FT4: 1.51 MIU/ML

## 2019-02-28 PROCEDURE — 99214 OFFICE O/P EST MOD 30 MIN: CPT | Performed by: FAMILY MEDICINE

## 2019-02-28 ASSESSMENT — ENCOUNTER SYMPTOMS
SHORTNESS OF BREATH: 0
SINUS PAIN: 0
WHEEZING: 0
GASTROINTESTINAL NEGATIVE: 1
ABDOMINAL PAIN: 0
COUGH: 1
TROUBLE SWALLOWING: 0
RHINORRHEA: 0
SORE THROAT: 0
VOICE CHANGE: 0

## 2019-02-28 ASSESSMENT — PATIENT HEALTH QUESTIONNAIRE - PHQ9: DEPRESSION UNABLE TO ASSESS: PT REFUSES

## 2019-03-23 LAB
CHOLESTEROL/HDL RATIO: 1.9 RATIO (ref 0–4.5)
CHOLESTEROL: 91 MG/DL (ref 50–200)
HDL, DIRECT: 47 MG/DL (ref 36–68)
LDL CHOLESTEROL CALCULATED: 22 MG/DL (ref 0–160)
TRIGL SERPL-MCNC: 110 MG/DL (ref 10–250)
VLDLC SERPL CALC-MCNC: 22 MG/DL (ref 0–40)

## 2019-04-17 DIAGNOSIS — E03.9 HYPOTHYROIDISM, UNSPECIFIED TYPE: ICD-10-CM

## 2019-04-17 DIAGNOSIS — E78.5 DYSLIPIDEMIA: ICD-10-CM

## 2019-04-17 RX ORDER — TOPIRAMATE 25 MG/1
25 TABLET ORAL NIGHTLY
Qty: 60 TABLET | Refills: 3 | Status: SHIPPED | OUTPATIENT
Start: 2019-04-17 | End: 2020-02-04 | Stop reason: SDUPTHER

## 2019-04-17 NOTE — TELEPHONE ENCOUNTER
E-scribe requesting refill for topiramate (TOPAMAX) 25 MG     . Please review and e-scribe if applicable.      Last Visit Date: 2/25/19    Next Visit Date:  Future Appointments   Date Time Provider Malathi Austin   5/2/2019  3:30 PM Amanda Soriano MD [facility]

## 2019-04-18 RX ORDER — SIMVASTATIN 40 MG
TABLET ORAL
Qty: 30 TABLET | Refills: 11 | Status: SHIPPED | OUTPATIENT
Start: 2019-04-18 | End: 2020-04-09 | Stop reason: SDUPTHER

## 2019-04-18 RX ORDER — LEVOTHYROXINE SODIUM 0.03 MG/1
TABLET ORAL
Qty: 30 TABLET | Refills: 11 | Status: SHIPPED | OUTPATIENT
Start: 2019-04-18 | End: 2020-04-09 | Stop reason: SDUPTHER

## 2019-08-08 ENCOUNTER — HOSPITAL ENCOUNTER (OUTPATIENT)
Dept: INTERVENTIONAL RADIOLOGY/VASCULAR | Age: 44
Discharge: HOME | End: 2019-08-10
Payer: COMMERCIAL

## 2019-08-08 VITALS
WEIGHT: 145 LBS | HEART RATE: 61 BPM | BODY MASS INDEX: 26.68 KG/M2 | DIASTOLIC BLOOD PRESSURE: 65 MMHG | TEMPERATURE: 97.4 F | HEIGHT: 62 IN | RESPIRATION RATE: 17 BRPM | SYSTOLIC BLOOD PRESSURE: 104 MMHG | OXYGEN SATURATION: 96 %

## 2019-08-08 DIAGNOSIS — I72.9 ANEURYSM (HCC): ICD-10-CM

## 2019-08-08 DIAGNOSIS — I60.9 SAH (SUBARACHNOID HEMORRHAGE) (HCC): Primary | ICD-10-CM

## 2019-08-08 LAB
GFR NON-AFRICAN AMERICAN: >60 ML/MIN
GFR SERPL CREATININE-BSD FRML MDRD: >60 ML/MIN
GFR SERPL CREATININE-BSD FRML MDRD: NORMAL ML/MIN/{1.73_M2}
GLUCOSE BLD-MCNC: 104 MG/DL (ref 74–100)
POC CHLORIDE: 111 MMOL/L (ref 98–107)
POC CREATININE: 0.72 MG/DL (ref 0.51–1.19)
POC HEMATOCRIT: 39 % (ref 36–46)
POC HEMOGLOBIN: 13.4 G/DL (ref 12–16)
POC POTASSIUM: 4.1 MMOL/L (ref 3.5–4.5)
POC SODIUM: 141 MMOL/L (ref 138–146)

## 2019-08-08 PROCEDURE — 84295 ASSAY OF SERUM SODIUM: CPT

## 2019-08-08 PROCEDURE — 99213 OFFICE O/P EST LOW 20 MIN: CPT | Performed by: PSYCHIATRY & NEUROLOGY

## 2019-08-08 PROCEDURE — C1769 GUIDE WIRE: HCPCS

## 2019-08-08 PROCEDURE — C1894 INTRO/SHEATH, NON-LASER: HCPCS

## 2019-08-08 PROCEDURE — 36224 PLACE CATH CAROTD ART: CPT | Performed by: PSYCHIATRY & NEUROLOGY

## 2019-08-08 PROCEDURE — 82947 ASSAY GLUCOSE BLOOD QUANT: CPT

## 2019-08-08 PROCEDURE — 85014 HEMATOCRIT: CPT

## 2019-08-08 PROCEDURE — 2709999900 HC NON-CHARGEABLE SUPPLY

## 2019-08-08 PROCEDURE — 82565 ASSAY OF CREATININE: CPT

## 2019-08-08 PROCEDURE — 36226 PLACE CATH VERTEBRAL ART: CPT | Performed by: PSYCHIATRY & NEUROLOGY

## 2019-08-08 PROCEDURE — 2580000003 HC RX 258: Performed by: PSYCHIATRY & NEUROLOGY

## 2019-08-08 PROCEDURE — 6360000002 HC RX W HCPCS: Performed by: STUDENT IN AN ORGANIZED HEALTH CARE EDUCATION/TRAINING PROGRAM

## 2019-08-08 PROCEDURE — 84132 ASSAY OF SERUM POTASSIUM: CPT

## 2019-08-08 PROCEDURE — 82435 ASSAY OF BLOOD CHLORIDE: CPT

## 2019-08-08 PROCEDURE — 76377 3D RENDER W/INTRP POSTPROCES: CPT | Performed by: PSYCHIATRY & NEUROLOGY

## 2019-08-08 PROCEDURE — C1887 CATHETER, GUIDING: HCPCS

## 2019-08-08 PROCEDURE — 99152 MOD SED SAME PHYS/QHP 5/>YRS: CPT | Performed by: PSYCHIATRY & NEUROLOGY

## 2019-08-08 PROCEDURE — 6360000004 HC RX CONTRAST MEDICATION: Performed by: PSYCHIATRY & NEUROLOGY

## 2019-08-08 PROCEDURE — 7100000010 HC PHASE II RECOVERY - FIRST 15 MIN

## 2019-08-08 PROCEDURE — C1760 CLOSURE DEV, VASC: HCPCS

## 2019-08-08 PROCEDURE — 7100000011 HC PHASE II RECOVERY - ADDTL 15 MIN

## 2019-08-08 RX ORDER — FENTANYL CITRATE 50 UG/ML
INJECTION, SOLUTION INTRAMUSCULAR; INTRAVENOUS
Status: COMPLETED | OUTPATIENT
Start: 2019-08-08 | End: 2019-08-08

## 2019-08-08 RX ORDER — HEPARIN SODIUM 5000 [USP'U]/ML
INJECTION, SOLUTION INTRAVENOUS; SUBCUTANEOUS
Status: COMPLETED | OUTPATIENT
Start: 2019-08-08 | End: 2019-08-08

## 2019-08-08 RX ORDER — ACETAMINOPHEN 325 MG/1
650 TABLET ORAL EVERY 4 HOURS PRN
Status: DISCONTINUED | OUTPATIENT
Start: 2019-08-08 | End: 2019-08-11 | Stop reason: HOSPADM

## 2019-08-08 RX ORDER — MIDAZOLAM HYDROCHLORIDE 1 MG/ML
INJECTION INTRAMUSCULAR; INTRAVENOUS
Status: COMPLETED | OUTPATIENT
Start: 2019-08-08 | End: 2019-08-08

## 2019-08-08 RX ORDER — IODIXANOL 270 MG/ML
100 INJECTION, SOLUTION INTRAVASCULAR
Status: COMPLETED | OUTPATIENT
Start: 2019-08-08 | End: 2019-08-08

## 2019-08-08 RX ORDER — SODIUM CHLORIDE 9 MG/ML
INJECTION, SOLUTION INTRAVENOUS CONTINUOUS
Status: DISCONTINUED | OUTPATIENT
Start: 2019-08-08 | End: 2019-08-11 | Stop reason: HOSPADM

## 2019-08-08 RX ADMIN — Medication 2 G: at 09:00

## 2019-08-08 RX ADMIN — HEPARIN SODIUM 2 ML: 5000 INJECTION INTRAVENOUS; SUBCUTANEOUS at 09:31

## 2019-08-08 RX ADMIN — FENTANYL CITRATE 25 MCG: 50 INJECTION INTRAMUSCULAR; INTRAVENOUS at 10:32

## 2019-08-08 RX ADMIN — IODIXANOL 109 ML: 270 INJECTION, SOLUTION INTRAVASCULAR at 10:48

## 2019-08-08 RX ADMIN — MIDAZOLAM HYDROCHLORIDE 0.5 MG: 1 INJECTION, SOLUTION INTRAMUSCULAR; INTRAVENOUS at 09:20

## 2019-08-08 RX ADMIN — FENTANYL CITRATE 25 MCG: 50 INJECTION INTRAMUSCULAR; INTRAVENOUS at 09:19

## 2019-08-08 RX ADMIN — SODIUM CHLORIDE: 9 INJECTION, SOLUTION INTRAVENOUS at 07:57

## 2019-08-08 ASSESSMENT — PAIN DESCRIPTION - FREQUENCY: FREQUENCY: CONTINUOUS

## 2019-08-08 ASSESSMENT — PAIN DESCRIPTION - PAIN TYPE: TYPE: ACUTE PAIN

## 2019-08-08 ASSESSMENT — PAIN DESCRIPTION - ORIENTATION: ORIENTATION: RIGHT

## 2019-08-08 ASSESSMENT — PAIN SCALES - GENERAL: PAINLEVEL_OUTOF10: 5

## 2019-08-08 ASSESSMENT — PAIN DESCRIPTION - LOCATION: LOCATION: ARM

## 2019-08-08 ASSESSMENT — PAIN DESCRIPTION - DESCRIPTORS: DESCRIPTORS: ACHING;NAGGING

## 2019-08-08 NOTE — H&P
Take 2 tablets by mouth every 6 hours as needed for Pain 1/26/18  Yes Angie Fitzgerald MD   albuterol sulfate HFA (VENTOLIN HFA) 108 (90 BASE) MCG/ACT inhaler Inhale 2 puffs into the lungs every 4 hours as needed for Wheezing or Shortness of Breath 5/24/17   Sophia Gaitan MD    Scheduled Meds:   ceFAZolin  2 g Intravenous Once     Continuous Infusions:   sodium chloride 75 mL/hr at 08/08/19 0757     PRN Meds:.  Past Medical History   has a past medical history of Caffeinism ([facility] [address] Generalized headaches, Low back pain, Migraine, Patient in clinical research study, Patient in clinical research study, Recurrent sinus infections, Ruptured middle cerebral artery aneurysm ([facility] [address] SAH (subarachnoid hemorrhage) ([facility] and Tobacco use. Past Surgical History   has a past surgical history that includes other surgical history (04/02/2013); Tubal ligation (05/24/2011); Brain aneurysm surgery (01/12/2018); and other surgical history (05/31/2018). Social History   reports that she has been smoking. She started smoking about 28 years ago. She has been smoking about 1.00 pack per day. She has never used smokeless tobacco.   reports that she does not drink alcohol. reports that she does not use drugs. Family History  family history includes Alcohol Abuse in her father; Breast Cancer in her paternal grandmother; Cancer in her maternal grandmother and sister; Peggyann Schwalbe in her maternal grandfather; Other in her father, mother, and another family member.     Review of Systems:  CONSTITUTIONAL:  negative for fevers, chills, fatigue and malaise    EYES:  negative for double vision, blurred vision and photophobia     HEENT:  negative for tinnitus, epistaxis and sore throat    RESPIRATORY:  negative for cough, shortness of breath, wheezing    CARDIOVASCULAR:  negative for chest pain, palpitations, syncope, edema    GASTROINTESTINAL:  negative for nausea, vomiting    GENITOURINARY:  negative for incontinence    MUSCULOSKELETAL:  negative

## 2019-08-08 NOTE — PROGRESS NOTES
Received post cerebral angiogram procedure to [facility] room 11. Assessment obtained. Restrictions reviewed with patient. Post procedure pathway initiated. Right groin site soft , safe guard inflated with 40ml of air, dry and intact. No hematoma noted. Patient without complaints. Head of bed flat with right leg straight.

## 2019-08-08 NOTE — PROGRESS NOTES
Patient admitted, consent signed and questions answered. Patient ready for procedure. Call light to reach with side rails up 2 of 2. Groin area to be clipped in IR. No family at bedside with patient. History and physical needs updated.

## 2019-08-28 ENCOUNTER — OFFICE VISIT (OUTPATIENT)
Dept: FAMILY MEDICINE CLINIC | Age: 44
End: 2019-08-28
Payer: COMMERCIAL

## 2019-08-28 VITALS
WEIGHT: 147 LBS | SYSTOLIC BLOOD PRESSURE: 126 MMHG | DIASTOLIC BLOOD PRESSURE: 86 MMHG | OXYGEN SATURATION: 99 % | HEART RATE: 75 BPM | HEIGHT: 62 IN | BODY MASS INDEX: 27.05 KG/M2

## 2019-08-28 DIAGNOSIS — Z23 NEEDS FLU SHOT: ICD-10-CM

## 2019-08-28 DIAGNOSIS — Z23 NEED FOR PROPHYLACTIC VACCINATION AGAINST STREPTOCOCCUS PNEUMONIAE (PNEUMOCOCCUS): ICD-10-CM

## 2019-08-28 DIAGNOSIS — Z00.00 WELL ADULT EXAM: ICD-10-CM

## 2019-08-28 DIAGNOSIS — R35.0 URINARY FREQUENCY: ICD-10-CM

## 2019-08-28 DIAGNOSIS — Z12.39 SCREENING BREAST EXAMINATION: ICD-10-CM

## 2019-08-28 DIAGNOSIS — N94.6 MENORRHALGIA: ICD-10-CM

## 2019-08-28 PROCEDURE — 90472 IMMUNIZATION ADMIN EACH ADD: CPT | Performed by: FAMILY MEDICINE

## 2019-08-28 PROCEDURE — 90471 IMMUNIZATION ADMIN: CPT | Performed by: FAMILY MEDICINE

## 2019-08-28 PROCEDURE — 90732 PPSV23 VACC 2 YRS+ SUBQ/IM: CPT | Performed by: FAMILY MEDICINE

## 2019-08-28 PROCEDURE — 90686 IIV4 VACC NO PRSV 0.5 ML IM: CPT | Performed by: FAMILY MEDICINE

## 2019-08-28 PROCEDURE — 99396 PREV VISIT EST AGE 40-64: CPT | Performed by: FAMILY MEDICINE

## 2019-08-28 RX ORDER — TOLTERODINE 4 MG/1
4 CAPSULE, EXTENDED RELEASE ORAL DAILY
Qty: 30 CAPSULE | Refills: 5 | Status: SHIPPED | OUTPATIENT
Start: 2019-08-28 | End: 2019-12-16 | Stop reason: SINTOL

## 2019-08-28 ASSESSMENT — PATIENT HEALTH QUESTIONNAIRE - PHQ9
2. FEELING DOWN, DEPRESSED OR HOPELESS: 0
SUM OF ALL RESPONSES TO PHQ QUESTIONS 1-9: 0
SUM OF ALL RESPONSES TO PHQ9 QUESTIONS 1 & 2: 0
1. LITTLE INTEREST OR PLEASURE IN DOING THINGS: 0
SUM OF ALL RESPONSES TO PHQ QUESTIONS 1-9: 0

## 2019-09-01 PROBLEM — R35.0 URINARY FREQUENCY: Status: ACTIVE | Noted: 2019-09-01

## 2019-11-19 ENCOUNTER — NURSE ONLY (OUTPATIENT)
Dept: FAMILY MEDICINE CLINIC | Age: 44
End: 2019-11-19
Payer: COMMERCIAL

## 2019-11-19 DIAGNOSIS — Z00.00 WELL ADULT EXAM: Primary | ICD-10-CM

## 2019-11-19 PROCEDURE — 86580 TB INTRADERMAL TEST: CPT | Performed by: FAMILY MEDICINE

## 2019-11-21 LAB
INDURATION: 0
TB SKIN TEST: NEGATIVE

## 2019-12-16 ENCOUNTER — HOSPITAL ENCOUNTER (OUTPATIENT)
Age: 44
Setting detail: SPECIMEN
Discharge: HOME | End: 2019-12-16
Payer: COMMERCIAL

## 2019-12-16 ENCOUNTER — OFFICE VISIT (OUTPATIENT)
Dept: FAMILY MEDICINE CLINIC | Age: 44
End: 2019-12-16
Payer: COMMERCIAL

## 2019-12-16 VITALS
WEIGHT: 151 LBS | DIASTOLIC BLOOD PRESSURE: 84 MMHG | SYSTOLIC BLOOD PRESSURE: 130 MMHG | HEART RATE: 100 BPM | TEMPERATURE: 97.9 F | OXYGEN SATURATION: 96 % | HEIGHT: 62 IN | BODY MASS INDEX: 27.79 KG/M2

## 2019-12-16 DIAGNOSIS — J34.89 SINUS PAIN: Primary | ICD-10-CM

## 2019-12-16 DIAGNOSIS — J34.89 SINUS PAIN: ICD-10-CM

## 2019-12-16 LAB — SEDIMENTATION RATE, ERYTHROCYTE: 4 MM (ref 0–20)

## 2019-12-16 PROCEDURE — 99213 OFFICE O/P EST LOW 20 MIN: CPT | Performed by: FAMILY MEDICINE

## 2019-12-16 PROCEDURE — 85651 RBC SED RATE NONAUTOMATED: CPT

## 2019-12-16 PROCEDURE — 36415 COLL VENOUS BLD VENIPUNCTURE: CPT

## 2019-12-16 RX ORDER — CLINDAMYCIN HYDROCHLORIDE 300 MG/1
300 CAPSULE ORAL 3 TIMES DAILY
Qty: 30 CAPSULE | Refills: 0 | Status: SHIPPED | OUTPATIENT
Start: 2019-12-16 | End: 2019-12-26

## 2019-12-16 ASSESSMENT — ENCOUNTER SYMPTOMS
SINUS PAIN: 1
SINUS PRESSURE: 0
RESPIRATORY NEGATIVE: 1
SORE THROAT: 0
FACIAL SWELLING: 0

## 2019-12-23 DIAGNOSIS — J34.89 SINUS PAIN: ICD-10-CM

## 2020-02-04 RX ORDER — TOPIRAMATE 25 MG/1
25 TABLET ORAL NIGHTLY
Qty: 60 TABLET | Refills: 3 | Status: SHIPPED | OUTPATIENT
Start: 2020-02-04 | End: 2020-05-11 | Stop reason: SDUPTHER

## 2020-04-09 RX ORDER — SIMVASTATIN 40 MG
40 TABLET ORAL NIGHTLY
Qty: 30 TABLET | Refills: 0 | Status: SHIPPED | OUTPATIENT
Start: 2020-04-09 | End: 2020-06-09 | Stop reason: SDUPTHER

## 2020-04-09 RX ORDER — LEVOTHYROXINE SODIUM 0.03 MG/1
25 TABLET ORAL DAILY
Qty: 30 TABLET | Refills: 0 | Status: SHIPPED | OUTPATIENT
Start: 2020-04-09 | End: 2020-06-09 | Stop reason: SDUPTHER

## 2020-04-09 NOTE — TELEPHONE ENCOUNTER
Brittnee Zheng is requesting a refill on the following medication(s):  Requested Prescriptions     Pending Prescriptions Disp Refills    simvastatin (ZOCOR) 40 MG tablet 30 tablet 11     Sig: Take 1 tablet by mouth nightly    levothyroxine (SYNTHROID) 25 MCG tablet 30 tablet 11     Sig: Take 1 tablet by mouth Daily       Last Visit Date (If Applicable):  85/05/4351    Next Visit Date:    Visit date not found

## 2020-05-11 RX ORDER — TOPIRAMATE 25 MG/1
25 TABLET ORAL NIGHTLY
Qty: 60 TABLET | Refills: 0 | Status: SHIPPED | OUTPATIENT
Start: 2020-05-11 | End: 2020-08-06 | Stop reason: SDUPTHER

## 2020-06-09 RX ORDER — LEVOTHYROXINE SODIUM 0.03 MG/1
25 TABLET ORAL DAILY
Qty: 30 TABLET | Refills: 0 | Status: SHIPPED | OUTPATIENT
Start: 2020-06-09 | End: 2020-07-09

## 2020-06-09 RX ORDER — SIMVASTATIN 40 MG
40 TABLET ORAL NIGHTLY
Qty: 30 TABLET | Refills: 0 | Status: SHIPPED | OUTPATIENT
Start: 2020-06-09 | End: 2020-07-09

## 2020-06-09 NOTE — TELEPHONE ENCOUNTER
I called and left voicemail for patient to call and establish with a provider and that only 30 days would be provided.           Consuelo Núñez is calling to request a refill on the following medication(s):  Requested Prescriptions     Pending Prescriptions Disp Refills    levothyroxine (SYNTHROID) 25 MCG tablet 30 tablet 0     Sig: Take 1 tablet by mouth Daily    simvastatin (ZOCOR) 40 MG tablet 30 tablet 0     Sig: Take 1 tablet by mouth nightly       Last Visit Date (If Applicable):  44/27/8561    Next Visit Date:    Visit date not found

## 2020-07-09 RX ORDER — SIMVASTATIN 40 MG
TABLET ORAL
Qty: 30 TABLET | Refills: 0 | Status: SHIPPED | OUTPATIENT
Start: 2020-07-09 | End: 2020-08-11

## 2020-07-09 RX ORDER — LEVOTHYROXINE SODIUM 0.03 MG/1
TABLET ORAL
Qty: 30 TABLET | Refills: 0 | Status: SHIPPED | OUTPATIENT
Start: 2020-07-09 | End: 2020-08-11

## 2020-07-16 ENCOUNTER — OFFICE VISIT (OUTPATIENT)
Dept: FAMILY MEDICINE CLINIC | Age: 45
End: 2020-07-16
Payer: COMMERCIAL

## 2020-07-16 VITALS
WEIGHT: 151 LBS | BODY MASS INDEX: 28.07 KG/M2 | OXYGEN SATURATION: 98 % | SYSTOLIC BLOOD PRESSURE: 124 MMHG | HEART RATE: 75 BPM | DIASTOLIC BLOOD PRESSURE: 80 MMHG

## 2020-07-16 PROCEDURE — 99214 OFFICE O/P EST MOD 30 MIN: CPT | Performed by: INTERNAL MEDICINE

## 2020-07-16 ASSESSMENT — ENCOUNTER SYMPTOMS
ABDOMINAL PAIN: 0
SHORTNESS OF BREATH: 0
COUGH: 0
BLOOD IN STOOL: 0
CHEST TIGHTNESS: 0
TROUBLE SWALLOWING: 0
RHINORRHEA: 0
SINUS PAIN: 0
SORE THROAT: 0
DIARRHEA: 0

## 2020-07-16 NOTE — PROGRESS NOTES
[address]  [address]  Dept: [phone]  Dept Fax: [phone]: [phone]     Visit Date:  7/16/2020    Patient:  Andrew Benítez  YOB: 1975    HPI:   Andrew Benítez presents today for   Chief Complaint   Patient presents with    Breast Mass     patient has a large lump on her right breast. patient found the lump on the 4th of july. Katie Barron HPI 69-year-old female with a history of smoking abuse currently smoking 1 ppd started the age of 13, family history of breast cancer in her grandmother(father side) diagnosed around age 46s, history of ruptured cerebral aneurysm requiring coiling/stenting, history of subarachnoid hemorrhage, hypothyroidism, migraine, dyslipidemia, history of recurrent breast cyst is coming in for breast mass. Patient reports in 2018 she noticed breast mass and underwent diagnostic mammogram and ultrasound that showed breast cysts which were benign. No FNA or breast biopsies were done. Since then she did not noticed any breast masses or cyst in her breast and stopped doing self breast  exams. Recently on July 4 she found a tick in her ear and started checking her body for other ticks/rashes and during the process she ended up discovering breast masses on her right breast that prompted her to come in for today for evaluation. She does report having probable nipple discharge from her right breast? seemed unsure about the discharge and noted to have some crusty lesion on the nipple. There is no redness/rash/dripping/dimpling of skin/skin changes/pulled in nipple or inverted nipple. Nothing on the left breast.  No lumps or bumps around her axillary area on both sides and no lumps or bumps that she felt on her left breast.    Family history of breast cancer-grandmother(fathers side) around age 46s and got rediagnosed around [de-identified].    No other gyn related cancers in family. Imaging done in 2018 showed:    the right breast superior outer quadrant, there are 2 oval-shaped masses measuring 3.5 and 2.1 cm which require further evaluation with compression views and ultrasound.       Further evaluation with ultrasound today showed two cystic anechoic lesions.  The largest measures 2.6 x 1 x 2.3 cm at the location 10 o'clock and the smaller lesion measures 1.6 x 0.4 x 1.3 cm at the location 9:30 o'clock.  Additionally, there is a small cystic lesion measuring 0.7 x 0.9 x 0.5 cm at the location 11 o'clock.             Prior to Visit Medications    Medication Sig Taking? Authorizing Provider   levothyroxine (SYNTHROID) 25 MCG tablet take 1 tablet by mouth once daily Yes Lois Aguilar MD   simvastatin (ZOCOR) 40 MG tablet take 1 tablet by mouth at bedtime Yes Lois Aguilar MD   topiramate (TOPAMAX) 25 MG tablet Take 1 tablet by mouth nightly Yes Lois Aguilar MD   LORATADINE ALLERGY RELIEF PO Take 1 tablet by mouth 10 mg. Yes Historical Provider, MD   aspirin 81 MG EC tablet Take 1 tablet by mouth daily Yes Jewels Wen MD   acetaminophen (TYLENOL) 325 MG tablet Take 2 tablets by mouth every 6 hours as needed for Pain Yes Mary Walter MD   albuterol sulfate HFA (VENTOLIN HFA) 108 (90 BASE) MCG/ACT inhaler Inhale 2 puffs into the lungs every 4 hours as needed for Wheezing or Shortness of Breath Yes Lois Aguilar MD        Allergies:  is allergic to prozac [fluoxetine hcl] and chantix [varenicline]. Past Medical History:   has a past medical history of Caffeinism ([address] Generalized headaches, Low back pain, Migraine, Patient in clinical research study, Patient in clinical research study, Recurrent sinus infections, Ruptured middle cerebral artery aneurysm ([address] SAH (subarachnoid hemorrhage) ([facility] [address] and Tobacco use. Past Surgical History   has a past surgical history that includes other surgical history (04/02/2013);  Tubal ligation Hematological: Negative for adenopathy. Psychiatric/Behavioral: Negative for behavioral problems and suicidal ideas. The patient is not nervous/anxious. Objective:     /80 (Site: Right Upper Arm, Position: Sitting)   Pulse 75   Wt 151 lb (68.5 kg)   SpO2 98%   BMI 28.07 kg/m²     Physical Exam  Vitals signs and nursing note reviewed. Constitutional:       Appearance: Normal appearance. HENT:      Head: Normocephalic and atraumatic. Cardiovascular:      Rate and Rhythm: Normal rate and regular rhythm. Pulses: Normal pulses. Heart sounds: Normal heart sounds. Pulmonary:      Effort: Pulmonary effort is normal.      Breath sounds: Normal breath sounds. No stridor. Abdominal:      Palpations: Abdomen is soft. Genitourinary:     Comments: Breast Exam-      Right breast superior outer quadrant around 10 o clock position-palpated masses(atleast 2 masses ), discrete soft and firm, mobile not fixed and slightly tender. Crusty looking lesion on the nipple.      -No axillary lymphadenopathy and no masses felt on left breast.No nipple discharge on left breast.           Musculoskeletal:         General: No swelling. Skin:     General: Skin is warm. Neurological:      Mental Status: She is alert and oriented to person, place, and time. Mental status is at baseline. Psychiatric:         Mood and Affect: Mood normal.             Assessment       Diagnosis Orders   1. Mass of upper outer quadrant of right breast  US BREAST COMPLETE RIGHT    [facility] DIGITAL DIAGNOSTIC W OR WO CAD [id number] [address] [place] - Sim DO Gisella, General Surgery, [place]   2. Cyst of right breast  US BREAST COMPLETE RIGHT    KYLE DIGITAL DIAGNOSTIC W OR WO CAD BILATERAL    [facility] - Sim Gisella, , General Surgery, [place]   3. Family history of breast cancer  US BREAST COMPLETE RIGHT    KYLE DIGITAL DIAGNOSTIC W OR WO CAD BILATERAL    [facility] - Sim Gisella, DO, General Surgery, [place]   4.  Smoker  US BREAST COMPLETE RIGHT    KYLE DIGITAL DIAGNOSTIC W OR WO CAD BILATERAL    [facility] - Bernadette Band, DO, General Surgery, [place]         PLAN     Diagnostic Mammogram and Ultrasound ordered. Even if the results are benign I would recommend biopsies versus FNA given family history. Referral for general surgery placed. Orders Placed This Encounter   Procedures    US BREAST COMPLETE RIGHT     Standing Status:   Future     Standing Expiration Date:   7/16/2021     Order Specific Question:   Reason for exam:     Answer:   Right breast superior outer quadrant around 10 o clock position-palpated couple of cysts/masses atleast 2 cm slightly tender, h/o of breast cysts seen in the past Ultrasound/mammogram.    KYLE DIGITAL DIAGNOSTIC W OR WO CAD BILATERAL     Standing Status:   Future     Standing Expiration Date:   9/16/2021     Order Specific Question:   Reason for exam:     Answer:   Right breast superior outer quadrant around 10 o clock position -palpated couple of cysts/masses atlast 2 cm slightly tender, h/o of breast cysts seen in the past Ultrasound/mammogram.    [facility] - Bernadette Band, DO, General Surgery, [place]     Referral Priority:   Routine     Referral Type:   Eval and Treat     Referral Reason:   Specialty Services Required     Referred to Provider:   Arpit Palacios DO     Requested Specialty:   General Surgery     Number of Visits Requested:   1        Return if symptoms worsen or fail to improve. Patient given educational materials - see patient instructions. Discussed use, benefit, and side effects of prescribed medications. All patient questions answered. Pt voiced understanding. Reviewed health maintenance.        Electronically signed Caridad Salazar MD on 7/16/2020 at 4:47 PM EDT

## 2020-07-16 NOTE — PATIENT INSTRUCTIONS
Patient Education        Breast Lumps: Care Instructions  Your Care Instructions  Breast lumps are common, especially in women between ages 27 and 48. Many women's breasts feel lumpy and tender before their menstrual period. Women also may have lumps when they are breastfeeding. Breast lumps may go away after menopause. All new breast lumps in women after menopause should be checked by a doctor. Although lumps may be normal for you, it is important to have your doctor check any lump or thickness that is not like the rest of your breast to make sure it is not cancer. A lump may be larger, harder, or different from the rest of your breast tissue. Follow-up care is a key part of your treatment and safety. Be sure to make and go to all appointments, and call your doctor if you are having problems. It's also a good idea to know your test results and keep a list of the medicines you take. How can you care for yourself at home? · Make an appointment to have a mammogram and other follow-up visits as recommended by your doctor. When should you call for help? Watch closely for changes in your health, and be sure to contact your doctor if:  · You do not get better as expected. · Your breast has changed. · You have pain in your breast.  · You have a discharge from your nipple. · A breast lump changes or does not go away. Where can you learn more? Go to https://[employer] aeb.codesy. org and sign in to your [employer] account. Enter P111 in the [employer] English box to learn more about \"Breast Lumps: Care Instructions. \"     If you do not have an account, please click on the \"Sign Up Now\" link. Current as of: November 8, 2019               Content Version: 12.5  © 8404-5865 Healthwise, Incorporated. Care instructions adapted under license by [facility] [facility]  If you have questions about a medical condition or this instruction, always ask your healthcare professional. [address] any warranty or liability for your use of this information.

## 2020-08-06 ENCOUNTER — OFFICE VISIT (OUTPATIENT)
Dept: SURGERY | Age: 45
End: 2020-08-06
Payer: COMMERCIAL

## 2020-08-06 VITALS
BODY MASS INDEX: 27.84 KG/M2 | DIASTOLIC BLOOD PRESSURE: 93 MMHG | SYSTOLIC BLOOD PRESSURE: 140 MMHG | TEMPERATURE: 98.3 F | HEIGHT: 62 IN | HEART RATE: 89 BPM | WEIGHT: 151.3 LBS

## 2020-08-06 PROCEDURE — 99203 OFFICE O/P NEW LOW 30 MIN: CPT | Performed by: SURGERY

## 2020-08-06 RX ORDER — TOPIRAMATE 25 MG/1
25 TABLET ORAL NIGHTLY
Qty: 60 TABLET | Refills: 0 | Status: SHIPPED | OUTPATIENT
Start: 2020-08-06 | End: 2020-11-12 | Stop reason: SDUPTHER

## 2020-08-06 NOTE — LETTER
[address]  Phone: [phone]  Fax: [phone]      8/10/20    Patient: Jonelle Fong  MRN: [id number]  : 1975  Date of visit: 2020    Dear Dr Harpreet Still: Thank you for the request for consultation for Jonelle Fong to me for the evaluation of R breast lump. Below are the relevant portions of my assessment and plan of care. If you have questions, please do not hesitate to call me. I look forward to following Jonelle Shahriarbobby along with you.     Sincerely,        Yunier Koenig, DO

## 2020-08-10 NOTE — PROGRESS NOTES
[facility] General Surgery   History & Physical  Geovanni Conde DO  Pt Name: Alicia Williamson  MRN: [id number]  YOB: 1975  Date of evaluation: 8/6/2020  Primary Care Physician: Dorothea Manriquez MD    Chief Complaint:   Chief Complaint   Patient presents with    New Patient     Patient in for Right upper outer quad breast examination. Patient states that the area is achy- at times. It is easily identifiable. She noticed the area around the [place] of July. In 2018 she had a cyst in the same spot. 2019 it was gone, now it is larger. She states it may look veiny but no other abnormalities. SUBJECTIVE:    History of Present Illness: This is a 41 y.o.  female who presents for evaluation for R breast lump, mammogram read as BiRADS 0, follow up ultrasound showed cluster of cysts. Pt reports chronic breast tenderness bilaterally but the palpable lump is more tender than her baseline. Current smoker 1ppd. Also reports intermittent scant clear yellow drainage from her right nipple in the last year, occurs maybe 1-2 times per month and is seen as a clear yellow scab that is easily removed, denies [facility] [place], no prior breast biopsies. Past Medical History   has a past medical history of Caffeinism ([address] Generalized headaches, Low back pain, Migraine, Patient in clinical research study, Patient in clinical research study, Recurrent sinus infections, Ruptured middle cerebral artery aneurysm ([address] SAH (subarachnoid hemorrhage) ([address] and Tobacco use. Past Surgical History   has a past surgical history that includes other surgical history (04/02/2013); Tubal ligation (05/24/2011); Brain aneurysm surgery (01/12/2018); and other surgical history (05/31/2018). Family History  family history includes Alcohol Abuse in her father; Breast Cancer in her paternal grandmother; Cancer in her maternal grandmother and sister; Thomas Countess in her maternal grandfather;  Other in her father, mother, and another family member. Social History  Tobacco use:  reports that she has been smoking. She started smoking about 29 years ago. She has been smoking about 1.00 pack per day. She has never used smokeless tobacco.  Alcohol use:  reports current alcohol use. Drug use:  reports no history of drug use. Medications  Current Medications:   Current Outpatient Medications   Medication Sig Dispense Refill    levothyroxine (SYNTHROID) 25 MCG tablet take 1 tablet by mouth once daily 30 tablet 0    simvastatin (ZOCOR) 40 MG tablet take 1 tablet by mouth at bedtime 30 tablet 0    LORATADINE ALLERGY RELIEF PO Take 1 tablet by mouth 10 mg.      aspirin 81 MG EC tablet Take 1 tablet by mouth daily 30 tablet 3    acetaminophen (TYLENOL) 325 MG tablet Take 2 tablets by mouth every 6 hours as needed for Pain 120 tablet 3    albuterol sulfate HFA (VENTOLIN HFA) 108 (90 BASE) MCG/ACT inhaler Inhale 2 puffs into the lungs every 4 hours as needed for Wheezing or Shortness of Breath 1 Inhaler 5    topiramate (TOPAMAX) 25 MG tablet Take 1 tablet by mouth nightly 60 tablet 0     No current facility-administered medications for this visit. Home Medications:   Prior to Admission medications    Medication Sig Start Date End Date Taking? Authorizing Provider   levothyroxine (SYNTHROID) 25 MCG tablet take 1 tablet by mouth once daily 7/9/20  Yes Tanya Kruse MD   simvastatin (ZOCOR) 40 MG tablet take 1 tablet by mouth at bedtime 7/9/20  Yes Tanya Kruse MD   LORATADINE ALLERGY RELIEF PO Take 1 tablet by mouth 10 mg.    Yes Historical Provider, MD   aspirin 81 MG EC tablet Take 1 tablet by mouth daily 6/3/18  Yes David Iyer MD   acetaminophen (TYLENOL) 325 MG tablet Take 2 tablets by mouth every 6 hours as needed for Pain 1/26/18  Yes Amirah Irwin MD   albuterol sulfate HFA (VENTOLIN HFA) 108 (90 BASE) MCG/ACT inhaler Inhale 2 puffs into the lungs every 4 hours as needed for Wheezing or Shortness of Breath 5/24/17  Yes Issa Alaniz MD   topiramate (TOPAMAX) 25 MG tablet Take 1 tablet by mouth nightly 8/6/20   Singh Mendez MD       Allergies  Prozac [fluoxetine hcl] and Chantix [varenicline]      Review of Systems:  General: Denies any fever, chills. Eyes: Denies any changes in vision, diplopia or eye pain  Ears, Nose, Mouth: Denies changes in hearing/tinnitus or drainage from ears, no rhinorrhea or bloody nose, no difficulty chewing  Throat: no difficulty swallowing, no throat pain  Respiratory: Denies any shortness of breath or cough. Cardiac: Denies any chest pain, palpitations, claudication or edema. Gastrointestinal: Denies any melena, hematochezia, hematemesis or pyrosis. Genitourinary: Denies any frequency, urgency, hesitancy or incontinence. Musculoskeletal: Denies worsening muscle weakness or recent trauma  Skin: Denies rashes or lesions  Psychiatric: Denies any recent changes in mood or affect  Hematologic: Denies bruising or bleeding easily. PHYSICAL EXAMINATION  Vitals:   Vitals:    08/06/20 1453   BP: (!) 140/93   Pulse: 89   Temp: 98.3 °F (36.8 °C)       General Appearance:  awake, alert, no acute distress, well developed, well nourished   Skin:  Skin color, texture, turgor normal. No rashes or lesions. Head/face:  NCAT, face symmetrical  Eyes:  PERRL, no evidence of conjunctivitis or ptosis bilaterally  Ears:  External ears and canals grossly normal, no evidence of otorrhea. Nose/Sinuses:  Nares normal. Septum midline. Mucosa normal. No external drainage noted. Mouth/Neck:  Mucosa moist.  No external oral lesions. Trachea midline. No visible masses. Lungs:  Normal chest expansion, unlabored breathing without accessory muscle use. No audible rales, rhonchi, or wheezing. Cardiovascular: S1S2. No evidence of JVD. No evidence of pulsatile masses in abdomen  Abdomen:  Soft, non-tender, no organomegaly, no masses.   Musculoskeletal: No evidence of bony/muscular deformities, trauma, atrophy of either left/right upper/lower extremity. No evidence of digital clubbing or cyanosis. Neurologic:  CN 2-12 grossly intact without obvious deficits. Grossly normal sensation in all extremities. Psychiatric: appropriate judgement and insight, appropriate recall of recent and remote memory, no evidence of depression/anxiety/agitation    Breast: exam performed with chaperone present in the room. Bilateral breasts grossly normal without deformities. Bilateral nipples everted. Normal elevation of breast bilaterally with arm elevation. Right breast grossly normal in appearance. Scattered fibroglandular changes consistent with patient's age. Nontender. No axillary or supraclavicular lymphadenopathy. No obvious masses noted on palpatory exam.    Left breast grossly normal in appearance. Scattered fibroglandular changes consistent with patient's age. Nontender. No axillary or supraclavicular lymphadenopathy.  No obvious masses noted on palpatory exam.      RADIOLOGY:  The following images and reports were personally reviewed with the following significant findings pertinent to the Chief Complaint and/or HPI:    [facility] Digital Diagnostic W Or Wo Cad Bilateral    Result Date: 2020                                          [address]                                                                                               Mammography Report                                                   Draft    Patient: Montserrat Akbar                                                                MR#: [id number]          : 1975                                                                [de-identified]            Age/Sex: 41 / F                                                                ADM Date: 20            Loc: [place] Attending Dr: Compa Fisher MD  Primary Care Dr: Ambrose Kwong MD      Ordering Physician: Hair Russo MD                                                Results:                    Date of Service: 07/23/20                                                Follow Up:                Procedure(s): MM diag mamm BI+CAD  Accession Number(s): [id number]                                                                              cc:        TECHNIQUE: Digital Mammography with CAD    HISTORY:  Diagnostic. PRIOR EXAM: 09/24/2019 and 04/03/2018    DATE SENT:      FINDINGS:     The breasts are heterogeneously dense. In the upper outer quadrant of the right breast is a 2 cm isodense spherical mass. This requires followup ultrasound. No other masses or areas of architectural distortion for concern are evident in either breast.     No abnormal calcifications. IMPRESSION:  1. Followup ultrasound right breast required. BIRADS CATEGORY:  0. Incomplete imaging. The patient has been entered into our mammography reporting system and will have the appropriate followup.         Dictated By:        Misty Carrera By:                                                                                                                                                                                                                                                DD/DT: 07/24/20                                                              TD/TT: 07/24/20 0231     : EUGENE    Us Breast Limited Right    Result Date: 7/24/2020                                          [address]                                                                                               Ultrasound Report Draft    Patient: Jackelin Hi                                                                MR#: [id number]          : 1975                                                                [de-identified]            Age/Sex: 41 / F                                                                ADM Date: 20            Loc: [place]                                                                                     Attending Dr: Nicole Rodriguez MD  Primary Care Dr. Erica Frankel MD      Ordering Physician: Joseph Becerra MD  Date of Service: 20  Procedure(s): US breast RT limited  Accession Number(s): [id number]    cc:        TECHNIQUE/VIEWS:      CLINICAL HISTORY:      COMPARISON:  No relevant prior studies available. FINDINGS:      Ultrasound of the right breast demonstrated a cluster of cysts in the upper outer quadrant of the right breast, which correlates to the mammographic abnormality. This cluster of several cysts measures approximately 2.5 x 1.9 cm. They are anechoic. There are no internal echoes. IMPRESSION:  1. Cluster of cysts in the upper outer quadrant of the right breast.  BI-RADS CATEGORY 2:  BENIGN FINDINGS. Dictated By:        Kimmie Muro By:                                                                                                                                                                                                                                                DD: 20                                                        TD/TT: 20 0827     : EUGENE      DIAGNOSES:   Diagnosis Orders   1. Pre-op testing  COVID-19 Ambulatory   2. Breast cyst, right     3.  Breast pain in female         PLAN:  · We had a long discussion regarding treatment options, pt's breast lump causes her increased discomfort and would like to have it removed, it is unclear whether this is contributing to the pt's nipple discharge but I would agree with excision for pathology evaluation. Risks include bleeding, infection, scarring, damage to surrounding tissues and nerves, deformed breast, need for further surgery. Benefits, alternatives, complications and procedure details were explained to the pt, all questions were answered.   ·       Medical Decision Making: low complexity     Electronically signed by Yunier Koenig DO on 8/10/2020 at 10:02 AM

## 2020-08-11 ENCOUNTER — TELEPHONE (OUTPATIENT)
Dept: NEUROLOGY | Age: 45
End: 2020-08-11

## 2020-08-11 RX ORDER — LEVOTHYROXINE SODIUM 0.03 MG/1
TABLET ORAL
Qty: 30 TABLET | Refills: 0 | Status: SHIPPED | OUTPATIENT
Start: 2020-08-11 | End: 2020-09-10

## 2020-08-11 RX ORDER — SIMVASTATIN 40 MG
TABLET ORAL
Qty: 30 TABLET | Refills: 0 | Status: SHIPPED | OUTPATIENT
Start: 2020-08-11 | End: 2020-09-10

## 2020-08-11 NOTE — TELEPHONE ENCOUNTER
Letter Created Signed by Me due to provider not being in the office and Faxed to [address].

## 2020-08-14 ENCOUNTER — TELEPHONE (OUTPATIENT)
Dept: NEUROLOGY | Age: 45
End: 2020-08-14

## 2020-08-14 NOTE — TELEPHONE ENCOUNTER
Shiraz Sepulveda from General surgery needs a letter faxed to her at [phone] telling them how many days before surgery pt needs to be off her ASA.  Please advise when patient should stop then go back on ASA

## 2020-08-24 LAB
ANION GAP SERPL CALCULATED.3IONS-SCNC: 14.1 MMOL/L
BUN BLDV-MCNC: 14 MG/DL (ref 7–17)
CALCIUM SERPL-MCNC: 9.3 MG/DL (ref 8.4–10.2)
CHLORIDE BLD-SCNC: 108 MMOL/L (ref 98–120)
CO2: 22 MMOL/L (ref 22–31)
CREAT SERPL-MCNC: 0.9 MG/DL (ref 0.5–1)
GFR CALCULATED: > 60
GLUCOSE: 101 MG/DL (ref 65–105)
HCG QUALITATIVE: NEGATIVE
HCT VFR BLD CALC: 39.8 % (ref 37–47)
HEMOGLOBIN: 13.6 (ref 12–16)
MCH RBC QN AUTO: 32.6 PG (ref 28.5–32.5)
MCHC RBC AUTO-ENTMCNC: 34.2 G/DL (ref 32–37)
MCV RBC AUTO: 95.2 FL (ref 80–94)
PDW BLD-RTO: 10.6 % (ref 8.5–15.5)
PLATELET # BLD: 291 THOU/MM3 (ref 130–400)
POTASSIUM SERPL-SCNC: 5.1 MMOL/L (ref 3.6–5)
RBC: 4.19 M/UL (ref 4.2–5.4)
SODIUM BLD-SCNC: 139 MMOL/L (ref 135–145)
WBC: 10.5 THOU/ML3 (ref 4.8–10.8)

## 2020-08-28 LAB — HCG URINE: NEGATIVE

## 2020-09-09 ENCOUNTER — VIRTUAL VISIT (OUTPATIENT)
Dept: SURGERY | Age: 45
End: 2020-09-09

## 2020-09-09 PROCEDURE — 99024 POSTOP FOLLOW-UP VISIT: CPT | Performed by: SURGERY

## 2020-09-09 NOTE — PROGRESS NOTES
2020    TELEHEALTH EVALUATION -- Audio/Visual (During CHNEN-11 public health emergency)    HPI:    Ashtyn Campbell (:  1975) has requested an audio/video evaluation for the following concern(s):    Follow up to excision of R breast cyst cluster, pathology was benign    Review of Systems    Prior to Visit Medications    Medication Sig Taking? Authorizing Provider   levothyroxine (SYNTHROID) 25 MCG tablet take 1 tablet by mouth once daily  Mulu Clifton MD   simvastatin (ZOCOR) 40 MG tablet take 1 tablet by mouth at bedtime  Mulu Clifton MD   topiramate (TOPAMAX) 25 MG tablet Take 1 tablet by mouth nightly  Mulu Clifton MD   LORATADINE ALLERGY RELIEF PO Take 1 tablet by mouth 10 mg. Historical Provider, MD   aspirin 81 MG EC tablet Take 1 tablet by mouth daily  Gill Sánchez MD   acetaminophen (TYLENOL) 325 MG tablet Take 2 tablets by mouth every 6 hours as needed for Pain  Gabriella Schilling MD   albuterol sulfate HFA (VENTOLIN HFA) 108 (90 BASE) MCG/ACT inhaler Inhale 2 puffs into the lungs every 4 hours as needed for Wheezing or Shortness of Breath  Sebastian Henry MD       Social History     Tobacco Use    Smoking status: Current Every Day Smoker     Packs/day: 1.00     Start date: 12     Last attempt to quit: 2018     Years since quittin.6    Smokeless tobacco: Never Used   Substance Use Topics    Alcohol use: Yes     Comment: rare    Drug use: No        PHYSICAL EXAMINATION:  [ INSTRUCTIONS:  \"[x]\" Indicates a positive item  \"[]\" Indicates a negative item  -- DELETE ALL ITEMS NOT EXAMINED]          Skin:  Incision well healed with skin glue intact, no evidence for bleeding/infection    ASSESSMENT/PLAN:  1. Breast cyst, right      2. Smoker unmotivated to quit    Continue local hygiene. Urged pt to quit smoking. F/U prn      No follow-ups on file. Ashtyn Campbell is a 41 y.o. female being evaluated by a Virtual Visit (video visit) encounter to address concerns as mentioned above. A caregiver was present when appropriate. Due to this being a TeleHealth encounter (During JNJLB-63 public health emergency), evaluation of the following organ systems was limited: Vitals/Constitutional/EENT/Resp/CV/GI//MS/Neuro/Skin/Heme-Lymph-Imm. Pursuant to the emergency declaration under the [address] [place] [address], [address] [place] [address] and the [employer] Act, this Virtual Visit was conducted with patient's (and/or legal guardian's) consent, to reduce the patient's risk of exposure to COVID-19 and provide necessary medical care. The patient (and/or legal guardian) has also been advised to contact this office for worsening conditions or problems, and seek emergency medical treatment and/or call 911 if deemed necessary. Patient identification was verified at the start of the visit: Yes    Total time spent on this encounter: Not billed by time    Services were provided through a video synchronous discussion virtually to substitute for in-person clinic visit. Patient and provider were located at their individual homes. --Mellissa Love DO on 9/9/2020 at 11:10 AM    An electronic signature was used to authenticate this note.

## 2020-09-10 RX ORDER — SIMVASTATIN 40 MG
TABLET ORAL
Qty: 30 TABLET | Refills: 0 | Status: SHIPPED | OUTPATIENT
Start: 2020-09-10 | End: 2020-10-15

## 2020-09-10 RX ORDER — LEVOTHYROXINE SODIUM 0.03 MG/1
TABLET ORAL
Qty: 30 TABLET | Refills: 0 | Status: SHIPPED | OUTPATIENT
Start: 2020-09-10 | End: 2020-10-15

## 2020-09-10 NOTE — TELEPHONE ENCOUNTER
Edmond Cotton is requesting a refill on the following medication(s):  Requested Prescriptions     Pending Prescriptions Disp Refills    simvastatin (ZOCOR) 40 MG tablet [Pharmacy Med Name: SIMVASTATIN 40 MG TABLET] 30 tablet 0     Sig: take 1 tablet by mouth at bedtime    levothyroxine (SYNTHROID) 25 MCG tablet [Pharmacy Med Name: LEVOTHYROXINE 25 MCG TABLET] 30 tablet 0     Sig: take 1 tablet by mouth once daily       Last Visit Date (If Applicable):  1/29/1697    Next Visit Date:    Visit date not found

## 2020-09-28 ENCOUNTER — TELEPHONE (OUTPATIENT)
Dept: NEUROSURGERY | Age: 45
End: 2020-09-28

## 2020-09-28 NOTE — TELEPHONE ENCOUNTER
MRA Head order is . Pt was scheduled for  but imaging was not completed. New MRA Head order placed. Cosign needed.

## 2020-10-06 ENCOUNTER — PATIENT MESSAGE (OUTPATIENT)
Dept: FAMILY MEDICINE CLINIC | Age: 45
End: 2020-10-06

## 2020-10-14 ENCOUNTER — OFFICE VISIT (OUTPATIENT)
Dept: FAMILY MEDICINE CLINIC | Age: 45
End: 2020-10-14
Payer: COMMERCIAL

## 2020-10-14 VITALS
DIASTOLIC BLOOD PRESSURE: 80 MMHG | OXYGEN SATURATION: 99 % | HEART RATE: 92 BPM | HEIGHT: 62 IN | BODY MASS INDEX: 27.6 KG/M2 | WEIGHT: 150 LBS | SYSTOLIC BLOOD PRESSURE: 126 MMHG

## 2020-10-14 PROCEDURE — 90471 IMMUNIZATION ADMIN: CPT | Performed by: FAMILY MEDICINE

## 2020-10-14 PROCEDURE — 90686 IIV4 VACC NO PRSV 0.5 ML IM: CPT | Performed by: FAMILY MEDICINE

## 2020-10-14 PROCEDURE — 99396 PREV VISIT EST AGE 40-64: CPT | Performed by: FAMILY MEDICINE

## 2020-10-14 RX ORDER — VARENICLINE TARTRATE 1 MG/1
1 TABLET, FILM COATED ORAL 2 TIMES DAILY
Qty: 180 TABLET | Refills: 1 | Status: SHIPPED | OUTPATIENT
Start: 2020-10-14 | End: 2021-06-14

## 2020-10-14 RX ORDER — VARENICLINE TARTRATE
KIT
Qty: 1 BOX | Refills: 0 | Status: SHIPPED | OUTPATIENT
Start: 2020-10-14 | End: 2021-06-14

## 2020-10-14 ASSESSMENT — PATIENT HEALTH QUESTIONNAIRE - PHQ9
SUM OF ALL RESPONSES TO PHQ9 QUESTIONS 1 & 2: 0
1. LITTLE INTEREST OR PLEASURE IN DOING THINGS: 0
2. FEELING DOWN, DEPRESSED OR HOPELESS: 0
SUM OF ALL RESPONSES TO PHQ QUESTIONS 1-9: 0

## 2020-10-14 NOTE — PROGRESS NOTES
Have you had an allergic reaction to the flu (influenza) shot? no  Are you allergic to eggs or any component of the flu vaccine? no  Do you have a history of Guillain-[place] Syndrome (GBS), which is paralysis after receiving the flu vaccine? no  Are you feeling well today? yes  Flu vaccine given as ordered. Patient tolerated it well. No questions re: VIS information.

## 2020-10-14 NOTE — PROGRESS NOTES
[address] [address] [address]  Dept: [phone]  Dept [facility]:[phone]    Gina Vallecillo is a 41 y.o. female who presents today for her medical conditions/complaints as notedbelow. Gina Vallecillo is c/o of Annual Exam (yearly physical) and Nicotine Dependence (would like to discuss smoking cessation)      HPI:     HPI    Working in [employer].  Enjoys this at this point. Feels good overall. Not really getting any exercise regularly. Would like to quit smoking is ready. Did try the chantix in the past.  Had nausea with it. Has also tried the wellbutrin but this was not as successful for her. Also had vivid dreams with the chantix but would like to try this again. Has been trying to cut down. Does not want to use the patches or gum- tried before without much success. HA have been stable doing well overall. Has not had any new symptoms from her history of the subarachnoid hemorrhage. Continues to follow-up with neurology as scheduled. Has had increasing urinary frequency she is having dysuria urgency or burning. She does not have any hematuria or abdominal pain. Said this for a long time. Did try a medication in the past but it gave her horrible dry mouth does not recall exactly which one it was. Did seem to help some with the urinary frequency however.     BP Readings from Last 3 Encounters:   10/14/20 126/80   08/06/20 (!) 140/93   07/16/20 124/80          (goal 120/80)    Wt Readings from Last 3 Encounters:   10/14/20 150 lb (68 kg)   08/06/20 151 lb 4.8 oz (68.6 kg)   07/16/20 151 lb (68.5 kg)        Past Medical History:   Diagnosis Date    Caffeinism ([address] 1989    Pop and Coffee    Generalized headaches 1991    Low back pain 2013    Migraine 1991    Migraines stopped after birth of first daughter in [facility] [address] Patient in clinical research study 01/12/2018    TARGET: Consented 01/12/2018, Completed 04/20/2018    Patient in clinical research study 2018    SMART: Consented 2018, Completed 2019    Recurrent sinus infections 2017    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 coil ablation ; MCA     OTHER SURGICAL HISTORY  2013    Pelvic Ablation    OTHER SURGICAL HISTORY  2018    3 coilings penumbra 3t ok/ MCA    TUBAL LIGATION  2011       Family History   Problem Relation Age of Onset    Other Mother         suicide, uterine fibroids    Alcohol Abuse Father     Other Father         brain aneurysm    Cancer Father         throat    Cancer Sister         thyroid    Breast Cancer Paternal Grandmother     Cancer Maternal Grandmother         bladder    Lung Cancer Maternal Grandfather     Other Other         Rhabdomyosarcoma       Social History     Tobacco Use    Smoking status: Current Every Day Smoker     Packs/day: 1.00     Start date: 12     Last attempt to quit: 2018     Years since quittin.7    Smokeless tobacco: Never Used   Substance Use Topics    Alcohol use: Yes     Comment: rare      Current Outpatient Medications   Medication Sig Dispense Refill    varenicline (CHANTIX) 1 MG tablet Take 1 tablet by mouth 2 times daily 180 tablet 1    varenicline (CHANTIX STARTING MONTH GARTH) 0.5 MG X 11 & 1 MG X 42 tablet Take by mouth.  1 box 0    mirabegron (MYRBETRIQ) 25 MG TB24 Take 1 tablet by mouth daily 30 tablet 5    topiramate (TOPAMAX) 25 MG tablet Take 1 tablet by mouth nightly 60 tablet 0    LORATADINE ALLERGY RELIEF PO Take 1 tablet by mouth 10 mg.      aspirin 81 MG EC tablet Take 1 tablet by mouth daily 30 tablet 3    acetaminophen (TYLENOL) 325 MG tablet Take 2 tablets by mouth every 6 hours as needed for Pain 120 tablet 3    simvastatin (ZOCOR) 40 MG tablet take 1 tablet by mouth at bedtime 30 tablet 0    levothyroxine (SYNTHROID) 25 MCG tablet take 1 tablet by mouth once daily 30 tablet 0    albuterol sulfate HFA (VENTOLIN HFA) 108 (90 BASE) MCG/ACT inhaler Inhale 2 puffs into the lungs every 4 hours as needed for Wheezing or Shortness of Breath (Patient not taking: Reported on 10/14/2020) 1 Inhaler 5     No current facility-administered medications for this visit. Allergies   Allergen Reactions    Prozac [Fluoxetine Hcl]      cutting    Chantix [Varenicline] Nausea And Vomiting     vomiting       Health Maintenance   Topic Date Due    HIV screen  11/12/2028 (Originally 1/5/1991)    Cervical cancer screen  03/22/2021    Lipid screen  10/17/2021    TSH testing  10/17/2021    DTaP/Tdap/Td vaccine (2 - Td) 12/03/2023    Flu vaccine  Completed    Pneumococcal 0-64 years Vaccine  Completed    Hepatitis A vaccine  Aged Out    Hepatitis B vaccine  Aged Out    Hib vaccine  Aged Out    Meningococcal (ACWY) vaccine  Aged Out       Subjective:      Review of Systems    Objective:     /80 (Site: Left Upper Arm, Position: Sitting, Cuff Size: Medium Adult)   Pulse 92   Ht 5' 2\" (1.575 m)   Wt 150 lb (68 kg)   SpO2 99%   BMI 27.44 kg/m²     Physical Exam  Vitals signs and nursing note reviewed. Constitutional:       General: She is not in acute distress. Appearance: Normal appearance. She is not ill-appearing. HENT:      Right Ear: Tympanic membrane normal.      Left Ear: Tympanic membrane normal.      Nose: Nose normal. No congestion or rhinorrhea. Right Sinus: No maxillary sinus tenderness or frontal sinus tenderness. Left Sinus: No maxillary sinus tenderness or frontal sinus tenderness. Eyes:      Extraocular Movements: Extraocular movements intact. Pupils: Pupils are equal, round, and reactive to light. Neck:      Musculoskeletal: Neck supple.    Cardiovascular:      Rate and Rhythm: Normal rate and regular rhythm. Pulses: Normal pulses. Heart sounds: Normal heart sounds, S1 normal and S2 normal. No murmur. Pulmonary:      Effort: Pulmonary effort is normal.      Breath sounds: Normal breath sounds. Abdominal:      General: There is no distension. Palpations: Abdomen is soft. There is no mass. Musculoskeletal:      Right lower leg: No edema. Left lower leg: No edema. Skin:     Capillary Refill: Capillary refill takes less than 2 seconds. Neurological:      General: No focal deficit present. Mental Status: She is alert and oriented to person, place, and time. Psychiatric:         Mood and Affect: Mood normal.         Behavior: Behavior normal.         Thought Content: Thought content normal.         Judgment: Judgment normal.         Assessment/Plan:      Diagnosis Orders   1. Well adult exam     2. Need for influenza vaccination  INFLUENZA, QUADV, 3 YRS AND OLDER, IM PF, PREFILL SYR OR SDV, 0.5ML (AFLURIA QUADV, PF)   3. Encounter for tobacco use cessation counseling     4. Tobacco abuse  varenicline (CHANTIX) 1 MG tablet    varenicline (CHANTIX STARTING MONTH PAK) 0.5 MG X 11 & 1 MG X 42 tablet   5. Screening for tuberculosis  Quantiferon (R) TB Gold, (Incubated)   6. Urinary frequency  mirabegron (MYRBETRIQ) 25 MG TB24     Reviewed well maintenance that is recommended at this time. Orders put in. Is due for tuberculosis screening for her work and orders given. Reviewed smoking cessation with Chantix as well as medication side effects today. We will quad and give Myrbetriq a trial for the urinary frequency use and side effects as well as symptom action reviewed with patient today.     Lab Results   Component Value Date    WBC 10.5 08/24/2020    HGB 13.6 08/24/2020    HCT 39.8 08/24/2020    .0 08/24/2020    CHOL 87 10/17/2020    TRIG 118 10/17/2020    HDL 42 10/17/2020    ALT 16 10/17/2020    AST 19 10/17/2020     10/17/2020    K 4.6 10/17/2020     10/17/2020    CREATININE 0.7 10/17/2020    BUN 8 10/17/2020    CO2 20 (L) 10/17/2020    TSH 1.81 10/17/2020    INR 0.9 10/11/2018       Return in about 1 year (around 10/14/2021) for Well adult. Patient given educational materials - see patientinstructions. Discussed use, benefit, and side effects of prescribed medications. All patient questions answered. Pt voiced understanding. Reviewed health maintenance. Instructed to continue current medications, diet andexercise. Patient agreed with treatment plan. Follow up as directed.      (Please note that portions of this note were completed with a voice-recognition program. Efforts were made to edit the dictation but occasionally words are mis-transcribed.)    Electronically signed by Conchita Rojas MD on 10/18/2020

## 2020-10-15 RX ORDER — SIMVASTATIN 40 MG
TABLET ORAL
Qty: 30 TABLET | Refills: 0 | Status: SHIPPED | OUTPATIENT
Start: 2020-10-15 | End: 2020-11-19

## 2020-10-15 RX ORDER — LEVOTHYROXINE SODIUM 0.03 MG/1
TABLET ORAL
Qty: 30 TABLET | Refills: 0 | Status: SHIPPED | OUTPATIENT
Start: 2020-10-15 | End: 2020-11-19

## 2020-10-15 NOTE — TELEPHONE ENCOUNTER
Anuj Linda is requesting a refill on the following medication(s):  Requested Prescriptions     Pending Prescriptions Disp Refills    simvastatin (ZOCOR) 40 MG tablet [Pharmacy Med Name: SIMVASTATIN 40 MG TABLET] 30 tablet 0     Sig: take 1 tablet by mouth at bedtime    levothyroxine (SYNTHROID) 25 MCG tablet [Pharmacy Med Name: LEVOTHYROXINE 25 MCG TABLET] 30 tablet 0     Sig: take 1 tablet by mouth once daily       Last Visit Date (If Applicable):  19/81/7467    Next Visit Date:    Visit date not found

## 2020-10-17 LAB
ALBUMIN/GLOBULIN RATIO: 1.8 G/DL
ALBUMIN: 4.1 G/DL (ref 3.5–5)
ALP BLD-CCNC: 54 UNITS/L (ref 38–126)
ALT SERPL-CCNC: 16 UNITS/L (ref 4–35)
ANION GAP SERPL CALCULATED.3IONS-SCNC: 11.6 MMOL/L
AST SERPL-CCNC: 19 UNITS/L (ref 14–36)
BILIRUB SERPL-MCNC: 0.6 MG/DL (ref 0.2–1.3)
BUN BLDV-MCNC: 8 MG/DL (ref 7–17)
CALCIUM SERPL-MCNC: 9.2 MG/DL (ref 8.4–10.2)
CHLORIDE BLD-SCNC: 113 MMOL/L (ref 98–120)
CHOLESTEROL/HDL RATIO: 2.07 RATIO (ref 0–4.5)
CHOLESTEROL: 87 MG/DL (ref 50–200)
CO2: 20 MMOL/L (ref 22–31)
CREAT SERPL-MCNC: 0.7 MG/DL (ref 0.5–1)
GFR CALCULATED: > 60
GLOBULIN: 2.3 G/DL
GLUCOSE: 96 MG/DL (ref 65–105)
HDLC SERPL-MCNC: 42 MG/DL (ref 36–68)
LDL CHOLESTEROL CALCULATED: 21.4 MG/DL (ref 0–160)
POTASSIUM SERPL-SCNC: 4.6 MMOL/L (ref 3.6–5)
SODIUM BLD-SCNC: 140 MMOL/L (ref 135–145)
T4 FREE: 0.85 NG/DL (ref 0.78–2.19)
TOTAL PROTEIN, SERUM: 6.4 G/DL (ref 6.3–8.2)
TRIGL SERPL-MCNC: 118 MG/DL (ref 10–250)
TSH SERPL DL<=0.05 MIU/L-ACNC: 1.81 MIU/ML (ref 0.49–4.67)
VLDLC SERPL CALC-MCNC: 24 MG/DL (ref 0–50)

## 2020-11-13 RX ORDER — TOPIRAMATE 25 MG/1
25 TABLET ORAL NIGHTLY
Qty: 60 TABLET | Refills: 0 | Status: SHIPPED | OUTPATIENT
Start: 2020-11-13 | End: 2021-01-04

## 2020-11-13 NOTE — TELEPHONE ENCOUNTER
Fredi Dubin is requesting a refill on the following medication(s):  Requested Prescriptions     Pending Prescriptions Disp Refills    topiramate (TOPAMAX) 25 MG tablet 60 tablet 0     Sig: Take 1 tablet by mouth nightly       Last Visit Date (If Applicable):  85/86/5837    Next Visit Date:    Visit date not found

## 2020-11-19 ENCOUNTER — PATIENT MESSAGE (OUTPATIENT)
Dept: FAMILY MEDICINE CLINIC | Age: 45
End: 2020-11-19

## 2020-12-02 ENCOUNTER — TELEPHONE (OUTPATIENT)
Dept: NEUROLOGY | Age: 45
End: 2020-12-02

## 2021-02-26 ENCOUNTER — HOSPITAL ENCOUNTER (OUTPATIENT)
Age: 46
Discharge: HOME | End: 2021-02-26

## 2021-02-26 PROCEDURE — 86481 TB AG RESPONSE T-CELL SUSP: CPT

## 2021-03-01 LAB — T-SPOT TB TEST: NORMAL

## 2021-04-01 DIAGNOSIS — E03.9 HYPOTHYROIDISM, UNSPECIFIED TYPE: ICD-10-CM

## 2021-04-01 DIAGNOSIS — E78.5 DYSLIPIDEMIA: ICD-10-CM

## 2021-04-01 RX ORDER — LEVOTHYROXINE SODIUM 0.03 MG/1
TABLET ORAL
Qty: 30 TABLET | Refills: 5 | Status: SHIPPED | OUTPATIENT
Start: 2021-04-01 | End: 2021-10-06

## 2021-04-01 RX ORDER — SIMVASTATIN 40 MG
TABLET ORAL
Qty: 30 TABLET | Refills: 5 | Status: SHIPPED | OUTPATIENT
Start: 2021-04-01 | End: 2021-10-06

## 2021-04-01 NOTE — TELEPHONE ENCOUNTER
Martin Stephens is calling to request a refill on the following medication(s):  Requested Prescriptions     Pending Prescriptions Disp Refills    simvastatin (ZOCOR) 40 MG tablet [Pharmacy Med Name: SIMVASTATIN 40 MG TABLET] 30 tablet 3     Sig: take 1 tablet by mouth at bedtime    levothyroxine (SYNTHROID) 25 MCG tablet [Pharmacy Med Name: LEVOTHYROXINE 25 MCG TABLET] 30 tablet 3     Sig: take 1 tablet by mouth once daily       Last Visit Date (If Applicable):  57/82/0995    Next Visit Date:    Visit date not found

## 2021-04-24 ENCOUNTER — OFFICE VISIT (OUTPATIENT)
Dept: FAMILY MEDICINE CLINIC | Age: 46
End: 2021-04-24
Payer: COMMERCIAL

## 2021-04-24 VITALS
BODY MASS INDEX: 29.01 KG/M2 | WEIGHT: 158.6 LBS | TEMPERATURE: 96.8 F | DIASTOLIC BLOOD PRESSURE: 70 MMHG | HEART RATE: 64 BPM | SYSTOLIC BLOOD PRESSURE: 124 MMHG | OXYGEN SATURATION: 99 %

## 2021-04-24 DIAGNOSIS — J01.90 ACUTE BACTERIAL SINUSITIS: Primary | ICD-10-CM

## 2021-04-24 DIAGNOSIS — B96.89 ACUTE BACTERIAL SINUSITIS: Primary | ICD-10-CM

## 2021-04-24 PROCEDURE — 99213 OFFICE O/P EST LOW 20 MIN: CPT | Performed by: NURSE PRACTITIONER

## 2021-04-24 RX ORDER — AMOXICILLIN AND CLAVULANATE POTASSIUM 875; 125 MG/1; MG/1
1 TABLET, FILM COATED ORAL 2 TIMES DAILY
Qty: 14 TABLET | Refills: 0 | Status: SHIPPED | OUTPATIENT
Start: 2021-04-24 | End: 2021-05-01

## 2021-04-24 RX ORDER — PREDNISONE 20 MG/1
40 TABLET ORAL DAILY
Qty: 14 TABLET | Refills: 0 | Status: SHIPPED | OUTPATIENT
Start: 2021-04-24 | End: 2021-05-01

## 2021-04-24 ASSESSMENT — ENCOUNTER SYMPTOMS
COUGH: 0
EYE DISCHARGE: 0
NAUSEA: 0
SINUS PRESSURE: 1
EYE ITCHING: 1
SORE THROAT: 1
SHORTNESS OF BREATH: 0
VOMITING: 0
EYE REDNESS: 1

## 2021-04-24 ASSESSMENT — PATIENT HEALTH QUESTIONNAIRE - PHQ9
SUM OF ALL RESPONSES TO PHQ9 QUESTIONS 1 & 2: 0
2. FEELING DOWN, DEPRESSED OR HOPELESS: 0

## 2021-04-24 NOTE — PROGRESS NOTES
[address]  [address] [address]  Dept: [phone]  Dept Fax: [phone]  Loc: [facility]       Chief Complaint   Patient presents with    Sinusitis     c/o ha on left side along with sinus and allergies, now has sinus pressure ha and left ear pain and watery left eye       Nurses Notes reviewed and I agree except as noted in the HPI. HISTORY OF PRESENT ILLNESS   Danielle Smith is a 40 y.o. female who presents for evaluation of congestion, sinus pressure, PND, and headache that have been ongoing for the past 10 days. Patient denies any fever, cough, shortness of breath, loss of smell or taste, or any known sick exposures. Reports a history of seasonal allergies for which she takes claritin daily. Does have a history of an aneurysm but denies any neurological deficits and states this does not feel anything like her aneurysm. States that she has been taking OTC Muncinex with mild relief of symptoms. REVIEW OF SYSTEMS     Review of Systems   Constitutional: Negative for chills and fever. HENT: Positive for congestion, postnasal drip, sinus pressure and sore throat. Eyes: Positive for redness and itching (with watering). Negative for discharge and visual disturbance. Respiratory: Negative for cough and shortness of breath. Cardiovascular: Negative for chest pain. Gastrointestinal: Negative for nausea and vomiting. Musculoskeletal: Negative for myalgias. Skin: Negative for rash. Allergic/Immunologic: Positive for environmental allergies. Neurological: Positive for headaches.        PAST MEDICAL HISTORY         Diagnosis Date    [facility]    Pop and Coffee    Generalized headaches 1991    Low back pain 2013    Migraine 1991    Migraines stopped after birth of first daughter in [address] Patient in clinical research study 01/12/2018    TARGET: her maternal grandfather; Other in her father, mother, and another family member. SOCIAL HISTORY     Patient  reports that she has been smoking. She started smoking about 30 years ago. She has been smoking about 1.00 pack per day. She has never used smokeless tobacco. She reports current alcohol use. She reports that she does not use drugs. PHYSICAL EXAM     VITALS  BP: 124/70, Temp: 96.8 °F (36 °C), Pulse: 64,  , SpO2: 99 %  Physical Exam  Vitals signs reviewed. Constitutional:       General: She is not in acute distress. Appearance: She is well-developed. She is not diaphoretic. HENT:      Right Ear: Tympanic membrane and ear canal normal.      Left Ear: Tympanic membrane is injected and erythematous. Nose: No signs of injury. Right Sinus: No maxillary sinus tenderness or frontal sinus tenderness. Left Sinus: Frontal sinus tenderness present. No maxillary sinus tenderness. Mouth/Throat:      Mouth: Mucous membranes are moist.      Pharynx: Oropharynx is clear. Eyes:      Conjunctiva/sclera:      Right eye: Right conjunctiva is not injected. Left eye: Left conjunctiva is not injected. Pupils: Pupils are equal.   Neck:      Musculoskeletal: Normal range of motion. Cardiovascular:      Rate and Rhythm: Normal rate and regular rhythm. Heart sounds: No murmur. Pulmonary:      Effort: Pulmonary effort is normal. No respiratory distress. Breath sounds: Normal breath sounds. Musculoskeletal:      Right knee: She exhibits normal range of motion. Left knee: She exhibits normal range of motion. Lymphadenopathy:      Head:      Right side of head: No tonsillar adenopathy. Left side of head: No tonsillar adenopathy. Cervical: No cervical adenopathy. Skin:     General: Skin is warm. Findings: No rash. Neurological:      Mental Status: She is alert and oriented to person, place, and time.    Psychiatric:         Behavior: Behavior normal. DIAGNOSTIC RESULTS   Labs:No results found for this visit on 04/24/21. IMAGING:  No orders to display       No images are attached to the encounter or orders placed in the encounter. CLINICAL COURSE COURSE:     Vitals:    04/24/21 0828   BP: 124/70   Pulse: 64   Temp: 96.8 °F (36 °C)   SpO2: 99%   Weight: 158 lb 9.6 oz (71.9 kg)         PROCEDURES:  None  FINAL IMPRESSION      1. Acute bacterial sinusitis         DISPOSITION/PLAN     Exam is consistent with sinusitis. Discussed with patient this is likely related to seasonal allergies. Discussed low suspicion for covid at this time, however test was offered. Patient declines testing at this time. Advised to use prescribed medications as directed as well as to continue loratadine and Mucinex at home. Patient discharged in stable condition. She is agreeable to the plan as discussed. Discharge instructions given by staff. PATIENT REFERRED TO:    Follow up with PCP as needed.     DISCHARGE MEDICATIONS:  New Prescriptions    AMOXICILLIN-CLAVULANATE (AUGMENTIN) 875-125 MG PER TABLET    Take 1 tablet by mouth 2 times daily for 7 days    PREDNISONE (DELTASONE) 20 MG TABLET    Take 2 tablets by mouth daily for 7 days         Electronically signed by LEATHA Luke CNP on 4/24/2021 at 9:00 AM

## 2021-04-24 NOTE — PATIENT INSTRUCTIONS
Patient Education        Sinusitis: Care Instructions  Your Care Instructions     Sinusitis is an infection of the lining of the sinus cavities in your head. Sinusitis often follows a cold. It causes pain and pressure in your head and face. In most cases, sinusitis gets better on its own in 1 to 2 weeks. But some mild symptoms may last for several weeks. Sometimes antibiotics are needed. Follow-up care is a key part of your treatment and safety. Be sure to make and go to all appointments, and call your doctor if you are having problems. It's also a good idea to know your test results and keep a list of the medicines you take. How can you care for yourself at home? · Take an over-the-counter pain medicine, such as acetaminophen (Tylenol), ibuprofen (Advil, Motrin), or naproxen (Aleve). Read and follow all instructions on the label. · If the doctor prescribed antibiotics, take them as directed. Do not stop taking them just because you feel better. You need to take the full course of antibiotics. · Be careful when taking over-the-counter cold or flu medicines and Tylenol at the same time. Many of these medicines have acetaminophen, which is Tylenol. Read the labels to make sure that you are not taking more than the recommended dose. Too much acetaminophen (Tylenol) can be harmful. · Breathe warm, moist air from a steamy shower, a hot bath, or a sink filled with hot water. Avoid cold, dry air. Using a humidifier in your home may help. Follow the directions for cleaning the machine. · Use saline (saltwater) nasal washes. This can help keep your nasal passages open and wash out mucus and bacteria. You can buy saline nose drops at a grocery store or drugstore. Or you can make your own at home by adding 1 teaspoon of salt and 1 teaspoon of baking soda to 2 cups of distilled water. If you make your own, fill a bulb syringe with the solution, insert the tip into your nostril, and squeeze gently. Adriane Perezor your nose.   · Put a hot, wet towel or a warm gel pack on your face 3 or 4 times a day for 5 to 10 minutes each time. · Try a decongestant nasal spray like oxymetazoline (Afrin). Do not use it for more than 3 days in a row. Using it for more than 3 days can make your congestion worse. When should you call for help? Call your doctor now or seek immediate medical care if:    · You have new or worse swelling or redness in your face or around your eyes.     · You have a new or higher fever. Watch closely for changes in your health, and be sure to contact your doctor if:    · You have new or worse facial pain.     · The mucus from your nose becomes thicker (like pus) or has new blood in it.     · You are not getting better as expected. Where can you learn more? Go to https://[employer]. org and sign in to your xaitment account. Enter H613 in the [employer] box to learn more about \"Sinusitis: Care Instructions. \"     If you do not have an account, please click on the \"Sign Up Now\" link. Current as of: December 2, 2020               Content Version: 12.8  © 0964-0928 [employer], [employer]. Care instructions adapted under license by [facility] ([facility]). If you have questions about a medical condition or this instruction, always ask your healthcare professional. [address]  any warranty or liability for your use of this information.

## 2021-06-14 ENCOUNTER — OFFICE VISIT (OUTPATIENT)
Dept: FAMILY MEDICINE CLINIC | Age: 46
End: 2021-06-14
Payer: COMMERCIAL

## 2021-06-14 VITALS
WEIGHT: 162 LBS | DIASTOLIC BLOOD PRESSURE: 86 MMHG | HEART RATE: 63 BPM | BODY MASS INDEX: 29.63 KG/M2 | SYSTOLIC BLOOD PRESSURE: 116 MMHG | OXYGEN SATURATION: 97 %

## 2021-06-14 DIAGNOSIS — I60.10 RUPTURED MIDDLE CEREBRAL ARTERY ANEURYSM (HCC): ICD-10-CM

## 2021-06-14 DIAGNOSIS — Z72.0 TOBACCO ABUSE: ICD-10-CM

## 2021-06-14 DIAGNOSIS — Z00.00 WELL ADULT EXAM: Primary | ICD-10-CM

## 2021-06-14 DIAGNOSIS — E03.9 HYPOTHYROIDISM, UNSPECIFIED TYPE: ICD-10-CM

## 2021-06-14 PROCEDURE — 99396 PREV VISIT EST AGE 40-64: CPT | Performed by: FAMILY MEDICINE

## 2021-06-14 SDOH — ECONOMIC STABILITY: TRANSPORTATION INSECURITY
IN THE PAST 12 MONTHS, HAS LACK OF TRANSPORTATION KEPT YOU FROM MEETINGS, WORK, OR FROM GETTING THINGS NEEDED FOR DAILY LIVING?: NO

## 2021-06-14 SDOH — ECONOMIC STABILITY: TRANSPORTATION INSECURITY
IN THE PAST 12 MONTHS, HAS THE LACK OF TRANSPORTATION KEPT YOU FROM MEDICAL APPOINTMENTS OR FROM GETTING MEDICATIONS?: NO

## 2021-06-14 SDOH — ECONOMIC STABILITY: FOOD INSECURITY: WITHIN THE PAST 12 MONTHS, YOU WORRIED THAT YOUR FOOD WOULD RUN OUT BEFORE YOU GOT MONEY TO BUY MORE.: NEVER TRUE

## 2021-06-14 SDOH — ECONOMIC STABILITY: FOOD INSECURITY: WITHIN THE PAST 12 MONTHS, THE FOOD YOU BOUGHT JUST DIDN'T LAST AND YOU DIDN'T HAVE MONEY TO GET MORE.: NEVER TRUE

## 2021-06-14 ASSESSMENT — SOCIAL DETERMINANTS OF HEALTH (SDOH): HOW HARD IS IT FOR YOU TO PAY FOR THE VERY BASICS LIKE FOOD, HOUSING, MEDICAL CARE, AND HEATING?: NOT HARD AT ALL

## 2021-06-14 NOTE — PROGRESS NOTES
[facility]  [address]  Dept: [phone]  Dept ZXA:[phone]    Mason Landon is a 40 y.o. female who presents today for her medical conditions/complaints as notedbelow. Mason Landon is c/o of Annual Exam (yearly wellness exam)      HPI:     HPI    Is doing pretty well. Quite smoking about 7 months ago. Since then has been bloated and has gained weight. Still drinks her coffee. Bowels are still easy and regular. Has not really tried anything for her weight loss. Is moving to [place] next month. She has not had any significant change in her headaches. She is due for repeat MRI and follow-up with neurology to reevaluate her known cerebral aneurysm. She has no new neurologic symptoms and she is taking her topiramate 25 mg at night daily    She has continued to take her levothyroxine regularly and has no signs or symptoms of hypothyroidism. She continues on her simvastatin 40 mg daily and tolerates this well. She has no chest pain palpitations shortness of breath or lower extremity edema.       BP Readings from Last 3 Encounters:   06/14/21 116/86   04/24/21 124/70   10/14/20 126/80          (goal 120/80)    Wt Readings from Last 3 Encounters:   06/14/21 162 lb (73.5 kg)   04/24/21 158 lb 9.6 oz (71.9 kg)   10/14/20 150 lb (68 kg)        Past Medical History:   Diagnosis Date    Caffeinism ([address] 1989    Pop and Coffee    Generalized headaches 1991    Low back pain 2013    Migraine 1991    Migraines stopped after birth of first daughter in [address] [place] [address] Patient in clinical research study 01/12/2018    TARGET: Consented 01/12/2018, Completed 04/20/2018    Patient in clinical research study 05/31/2018    SMART: Consented 05/31/2018, Completed 01/22/2019    Recurrent sinus infections 05/24/2017    Ruptured middle cerebral artery aneurysm ([address] 01/12/2018    Right MCA    SAH (subarachnoid hemorrhage) ([address] 01/12/2018    Tobacco use       Past Surgical History:   Procedure Laterality Date    BRAIN ANEURYSM SURGERY  2018    4 coils target martine,ultra 3t ok. coil ablation ; MCA     OTHER SURGICAL HISTORY  2013    Pelvic Ablation    OTHER SURGICAL HISTORY  2018    3 coilings penumbra 3t ok/ MCA    TUBAL LIGATION  2011       Family History   Problem Relation Age of Onset    Other Mother         suicide, uterine fibroids    Alcohol Abuse Father     Other Father         brain aneurysm    Cancer Father         throat    Cancer Sister         thyroid    Breast Cancer Paternal Grandmother     Cancer Maternal Grandmother         bladder    Lung Cancer Maternal Grandfather     Other Other         Rhabdomyosarcoma       Social History     Tobacco Use    Smoking status: Former Smoker     Packs/day: 1.00     Start date: 13     Quit date: 2020     Years since quittin.6    Smokeless tobacco: Never Used   Substance Use Topics    Alcohol use: Yes     Comment: rare      Current Outpatient Medications   Medication Sig Dispense Refill    simvastatin (ZOCOR) 40 MG tablet take 1 tablet by mouth at bedtime 30 tablet 5    levothyroxine (SYNTHROID) 25 MCG tablet take 1 tablet by mouth once daily 30 tablet 5    topiramate (TOPAMAX) 25 MG tablet take 1 tablet by mouth nightly 60 tablet 5    LORATADINE ALLERGY RELIEF PO Take 1 tablet by mouth 10 mg.      aspirin 81 MG EC tablet Take 1 tablet by mouth daily 30 tablet 3    acetaminophen (TYLENOL) 325 MG tablet Take 2 tablets by mouth every 6 hours as needed for Pain 120 tablet 3    albuterol sulfate HFA (VENTOLIN HFA) 108 (90 BASE) MCG/ACT inhaler Inhale 2 puffs into the lungs every 4 hours as needed for Wheezing or Shortness of Breath (Patient not taking: Reported on 2021) 1 Inhaler 5     No current facility-administered medications for this visit.      Allergies   Allergen Reactions    Prozac [Fluoxetine Hcl]      cutting    Chantix [Varenicline] Nausea Neurological:      General: No focal deficit present. Mental Status: She is alert and oriented to person, place, and time. Psychiatric:         Mood and Affect: Mood normal.         Behavior: Behavior normal.         Thought Content: Thought content normal.         Judgment: Judgment normal.         Assessment/Plan:     1. Well adult exam  2. Hypothyroidism, unspecified type  3. Tobacco abuse  4. Ruptured middle cerebral artery aneurysm (HCC)    Check labs annually including her thyroid and her lipids. Ensure these are at appropriate levels. Ensure has her MRI prior to her move to [place] as well as her neurosurgery evaluation to prevent any gaps in care with the move. Mammogram completed in July 2020 so will be due for this sometime in the next 12 months. Lab Results   Component Value Date    WBC 10.5 08/24/2020    HGB 13.6 08/24/2020    HCT 39.8 08/24/2020    .0 08/24/2020    CHOL 87 10/17/2020    TRIG 118 10/17/2020    HDL 42 10/17/2020    ALT 16 10/17/2020    AST 19 10/17/2020     10/17/2020    K 4.6 10/17/2020     10/17/2020    CREATININE 0.7 10/17/2020    BUN 8 10/17/2020    CO2 20 (L) 10/17/2020    TSH 1.81 10/17/2020    INR 0.9 10/11/2018       Return in about 1 year (around 6/14/2022), or if symptoms worsen or fail to improve. Patient given educational materials - see patientinstructions. Discussed use, benefit, and side effects of prescribed medications. All patient questions answered. Pt voiced understanding. Reviewed health maintenance. Instructed to continue current medications, diet andexercise. Patient agreed with treatment plan. Follow up as directed.      (Please note that portions of this note were completed with a voice-recognition program. Efforts were made to edit the dictation but occasionally words are mis-transcribed.)    Electronically signed by Evangelina Zhang MD on 6/20/2021

## 2021-06-30 ENCOUNTER — HOSPITAL ENCOUNTER (OUTPATIENT)
Dept: MRI IMAGING | Facility: CLINIC | Age: 46
Discharge: HOME | End: 2021-07-02
Payer: COMMERCIAL

## 2021-06-30 DIAGNOSIS — I60.9 SUBARACHNOID HEMORRHAGE (HCC): ICD-10-CM

## 2021-06-30 PROCEDURE — 70546 MR ANGIOGRAPH HEAD W/O&W/DYE: CPT

## 2021-06-30 PROCEDURE — A9579 GAD-BASE MR CONTRAST NOS,1ML: HCPCS | Performed by: PSYCHIATRY & NEUROLOGY

## 2021-06-30 PROCEDURE — 6360000004 HC RX CONTRAST MEDICATION: Performed by: PSYCHIATRY & NEUROLOGY

## 2021-06-30 RX ADMIN — GADOTERIDOL 15 ML: 279.3 INJECTION, SOLUTION INTRAVENOUS at 15:20

## 2022-07-25 DIAGNOSIS — E03.9 HYPOTHYROIDISM, UNSPECIFIED TYPE: ICD-10-CM

## 2022-07-25 RX ORDER — LEVOTHYROXINE SODIUM 0.03 MG/1
TABLET ORAL
Qty: 180 TABLET | Refills: 0 | OUTPATIENT
Start: 2022-07-25

## 2022-10-27 DIAGNOSIS — E03.9 HYPOTHYROIDISM, UNSPECIFIED TYPE: ICD-10-CM

## 2022-10-27 RX ORDER — LEVOTHYROXINE SODIUM 0.03 MG/1
TABLET ORAL
Qty: 180 TABLET | Refills: 0 | OUTPATIENT
Start: 2022-10-27

## 2024-05-25 NOTE — PROGRESS NOTES
Daily Progress Note  [x] Neuro Critical Care  [] Neuro Surgery     Patient Name: Dulce العراقي  Patient : 1975  Room/Bed: 89/6197-43  Allergies: Allergies   Allergen Reactions    Prozac [Fluoxetine Hcl]      cutting    Chantix [Varenicline] Nausea And Vomiting     vomiting     Problem List:   Patient Active Problem List   Diagnosis    Tobacco use    Genital warts    Migraine    SAH (subarachnoid hemorrhage) (HCC)    Aneurysmal subarachnoid hemorrhage (HCC)    Generalized headache    Ruptured cerebral aneurysm (HCC)    Cerebral aneurysm    Hyponatremia    Hydrocephalus    Hypothyroidism    Dyslipidemia    Middle cerebral artery aneurysm    Aneurysm ([address]   [facility] MRI-safe endovascular aneurysm coil present    Aneurysm of posterior communicating artery     INTERVAL HISTORY:   44 y.o. female who presents with PMH of [address] secondary to ruptured right MCA aneurysm s/p primary coil embolization on 18 and re treatment on 18 with stent assisted coil embolization. She is presenting today for treatment of the right PCOM aneurysm. Patient currently denies tingling/numbness/focal weakness/LOC/speech difficulty/vision loss/difficulty swallowing or seizure like activity. 10/12: Evaluated and found AAOx3, NAD, afebrile. Patient yesterday got Angiogram with 2 stents placed in Y configuration for her Right PCOM Aneurysm. Tolerated procedure well. Today no hematoma in right femoral access, feeling good, no headaches. Right leg with pulse, good , warm, no paresthesias. As per endovascular patient can be discharge home if lab work comes back WNL.  Will starte her on Magnesium and Riboflavin for headaches    CURRENT MEDICATIONS:  SCHEDULED MEDICATIONS:   aspirin  81 mg Oral Daily    clopidogrel  75 mg Oral Daily    levothyroxine  25 mcg Oral Daily    cetirizine  10 mg Oral Daily    simvastatin  40 mg Oral Nightly    sodium chloride flush  10 mL Intravenous 2 times per day    famotidine  20 mg Oral BID     CONTINUOUS INFUSIONS:   lactated ringers 100 mL/hr at 10/11/18 0759    sodium chloride 100 mL/hr at 10/11/18 1510     PRN MEDICATIONS:   acetaminophen, fentanNYL **OR** fentanNYL, albuterol sulfate HFA, sodium chloride flush, magnesium hydroxide, ondansetron, acetaminophen, oxyCODONE-acetaminophen **OR** oxyCODONE-acetaminophen, labetalol    VITALS:  Temperature Range: Temp: 98.1 °F (36.7 °C) Temp  Av °F (35.6 °C)  Min: 95.5 °F (35.3 °C)  Max: 98.4 °F (36.9 °C)  BP Range: Systolic (11MDK), AHK:15 , Min:78 , SUQ:230     Diastolic (91JQH), TFV:31, Min:38, Max:98    Pulse Range: Pulse  Av.2  Min: 51  Max: 77  Respiration Range: Resp  Av.7  Min: 0  Max: 25  Current Pulse Ox: SpO2: 95 %  24HR Pulse Ox Range: SpO2  Av.5 %  Min: 89 %  Max: 100 %  Patient Vitals for the past 12 hrs:   BP Temp Temp src Pulse Resp SpO2   10/12/18 0700 94/66 98.1 °F (36.7 °C) - 55 18 95 %   10/12/18 0600 (!) 78/46 - - 56 17 94 %   10/12/18 0500 (!) 85/54 - - 59 19 93 %   10/12/18 0400 (!) 79/56 98.4 °F (36.9 °C) - 51 17 94 %   10/12/18 0300 99/63 - - 68 19 94 %   10/12/18 0200 (!) 87/46 - - 51 15 94 %   10/12/18 0105 88/61 - - 54 18 95 %   10/12/18 0005 (!) 87/38 - - 64 21 94 %   10/12/18 0000 - 98 °F (36.7 °C) - - - -   10/11/18 2305 (!) 89/56 - - 57 19 95 %   10/11/18 2205 - - - 61 17 95 %   10/11/18 2100 91/60 - - 72 19 96 %   10/11/18 2015 (!) 105/55 97.2 °F (36.2 °C) Oral 58 17 -   10/11/18 2000 - 97.2 °F (36.2 °C) - 72 21 95 %     Estimated body mass index is 26.16 kg/m² as calculated from the following:    Height as of this encounter: 5' 2\" (1.575 m).     Weight as of this encounter: 143 lb (64.9 kg).  []<16 Severe malnutrition  []16-16.99 Moderate malnutrition  []17-18.49 Mild malnutrition  []18.5-24.9 Normal  [x]25-29.9 Overweight (not obese)  []30-34.9 Obese class 1 (Low Risk)  []35-39.9 Obese class 2 (Moderate Risk)  []?40 Obese class 3 (High Risk)    RECENT LABS:   DATA  Recent Results (from the past 24 hour(s))   CBC    Collection Time: 10/11/18  7:59 AM   Result Value Ref Range    WBC 11.1 3.5 - 11.3 k/uL    RBC 4.23 3.95 - 5.11 m/uL    Hemoglobin 13.4 11.9 - 15.1 g/dL    Hematocrit 40.6 36.3 - 47.1 %    MCV 96.0 82.6 - 102.9 fL    MCH 31.7 25.2 - 33.5 pg    MCHC 33.0 28.4 - 34.8 g/dL    RDW 12.9 11.8 - 14.4 %    Platelets 105 785 - 179 k/uL    MPV 8.7 8.1 - 13.5 fL    NRBC Automated 0.0 0.0 per 100 WBC   BASIC METABOLIC PANEL    Collection Time: 10/11/18  7:59 AM   Result Value Ref Range    Glucose 94 70 - 99 mg/dL    BUN 10 6 - 20 mg/dL    CREATININE 0.66 0.50 - 0.90 mg/dL    Bun/Cre Ratio NOT REPORTED 9 - 20    Calcium 9.0 8.6 - 10.4 mg/dL    Sodium 139 135 - 144 mmol/L    Potassium 4.2 3.7 - 5.3 mmol/L    Chloride 105 98 - 107 mmol/L    CO2 23 20 - 31 mmol/L    Anion Gap 11 9 - 17 mmol/L    GFR Non-African American >60 >60 mL/min    GFR African American >60 >60 mL/min    GFR Comment          GFR Staging NOT REPORTED    PROTIME-INR    Collection Time: 10/11/18  7:59 AM   Result Value Ref Range    Protime 9.7 9.0 - 12.0 sec    INR 0.9    APTT    Collection Time: 10/11/18  7:59 AM   Result Value Ref Range    PTT 23.4 20.5 - 30.5 sec   TYPE AND SCREEN    Collection Time: 10/11/18  7:59 AM   Result Value Ref Range    Expiration Date 10/14/2018     Arm Band Number BE [id number]     ABO/Rh A NEGATIVE     Antibody Screen NEGATIVE    PLATELET FUNCTION TEST    Collection Time: 10/11/18  7:59 AM   Result Value Ref Range    ABDIFATAH/EPI Clos Time 163 85 - 172 sec    Collagen Adenosine-5'-Diphosphate (Adp) Time 92 67 - 112 sec    Platelet Function Interp       Normal platelet function.  If patient clinical history/Physical examination is   Activated clotting time    Collection Time: 10/11/18  8:54 AM   Result Value Ref Range    Activated Clotting Time 140 79 - 149 sec   Activated clotting time    Collection Time: 10/11/18  9:38 AM   Result Value Ref Range    Activated Clotting Time 188 (H) 79 - 149 sec   Activated clotting time    Collection Time: 10/11/18 10:20 AM   Result Value Ref Range    Activated Clotting Time 229 (H) 79 - 149 sec   Activated clotting time    Collection Time: 10/11/18 10:43 AM   Result Value Ref Range    Activated Clotting Time 261 (H) 79 - 149 sec   Activated clotting time    Collection Time: 10/11/18 11:22 AM   Result Value Ref Range    Activated Clotting Time 241 (H) 79 - 149 sec   Activated clotting time    Collection Time: 10/11/18 12:00 PM   Result Value Ref Range    Activated Clotting Time 123 79 - 149 sec       24 HOUR INTAKE/OUTPUT:  Intake/Output Summary (Last 24 hours) at 10/12/18 0755  Last data filed at 10/12/18 0500   Gross per 24 hour   Intake             4383 ml   Output             3055 ml   Net             1328 ml     RADIOLOGY:   Labs and Images reviewed with:  [] Calvin Godinez MD      [] Melinda Espitia MD       [] Suzanne Emerson MD  [] Stiven Huggins MD  [] Teresa Peña MD  --[x] there are no new interval images to review. PHYSICAL EXAM:  CONSTITUTIONAL:  Well developed, well nourished, alert and oriented x 3, in no acute distress. GCS 15. Nontoxic. No dysarthria. No aphasia.    HEAD:  normocephalic, atraumatic    EYES:  PERRLA, EOMI.   ENT:  moist mucous membranes   NECK:  supple, symmetric, no midline tenderness to palpation    BACK:  without midline tenderness, step-offs or deformities    LUNGS:  Equal air entry bilaterally   CARDIOVASCULAR:  normal s1 / s2   ABDOMEN:  Soft, no rigidity   NEUROLOGIC:  Mental Status:  A & O x3,awake             Cranial Nerves:    cranial nerves II-XII are grossly intact    Motor Exam:    Drift:  absent  Tone:  normal    Motor exam is symmetrical 5 out of 5 all extremities bilaterally    Sensory:    Touch:    Right Upper Extremity:  normal  Left Upper Extremity:  normal  Right Lower Extremity:  normal  Left Lower Extremity:  normal    Deep Tendon Reflexes:    Right Bicep:  2+  Left Bicep:  2+  Right Knee:  2+  Left Knee:  2+    Plantar Response: Right:  downgoing  Left:  downgoing    Clonus:  absent  Pyle's:  absent    Coordination/Dysmetria:  Heel to Shin:  Right:  normal  Left:  normal  Finger to Nose:   Right:  normal  Left:  normal     Gait:  normal     DRAINS:  [x] There are no drains for Neuro Critical Care to monitor at this time. PLAN / MEDICAL DECISION MAKING:    Case of a 43 y/o female patient that came for Right PCOM Aneurysm repair. Patient is s/p Angiogram with two 4.5 mm x 21 mm [place] stents placed in Y configuration. POD # 1. Possible Discharge Home after lab work     NEUROLOGIC:  - AAOx3 s/p Angiogram with 2 stents placed in Y configuration POD # 1  - Patient refers some mild headache post procedure but this morning has been doing well 1/10 on intensity  - Fentanyl, Percoset and Tylenol for pain  - Magnesium oxide 400mg PO D and Riboflavin 100mg PO D for headache prevention     CARDIOVASCULAR:  - RRR  - On telemetry  - Patient on 80-90 but no sympotms. Patient's baseline is low 90's  - Watch for hypotension     PULMONARY:  - CTAx2  - Incentive Spirometry     RENAL/FLUID/ELECTROLYTE:  - F/U BMP, Mg, PO4  - [place] removed yesterday  - Urinating well  - Inputs and outputs     GI/NUTRITION:  NUTRITION:  DIET GENERAL;     ID:  - TMax 36.9 C  - No leukocytosis  - No sepsis or infectious signs     HEMATOLOGIC:  - F/U CBC  - No active bleeding at moment  - At moment of evaluation right groin area with no hematoma, pulses positive, warm, no paresthesias     ENDOCRINE:  PROBLEMS:  - None  PLAN:   - monitor blood glucose     PROPHYLAXIS:  Stress ulcer: H2 blocker     DVT PROPHYLAXIS:  - SCD sleeves - Thigh High   - AIDAN stockings - Thigh High  - No chemoprophylaxis anticoagulation at this time. - Aspirin 81mg PO D and Plavix 75mg PO D starting today    DISPOSITION:  [] To remain ICU:  [x] OK for out of ICU from Neuro Critical Care standpoint.  Will be discharge  1.) Neuro endovascular appointment in 2 weeks and in 3 months  2.) MRA W contrast in 3 months (days before 3 month appointment)  3.) Magnesium and Riboflavin for headache prevention  4.) Continue Aspirin, Plavix, Statin    We will continue to follow along. For any changes in exam or patient status please contact.   [] Neuro Surgery  [x] Neuro Critical Care  [] Neuro Endovascular       Adalgisa Lo MD  Neurosurgery Resident  Neuro Critical Care  Pager [phone]  10/12/2018     7:55 AM Female